# Patient Record
Sex: MALE | Race: WHITE | NOT HISPANIC OR LATINO | Employment: OTHER | URBAN - METROPOLITAN AREA
[De-identification: names, ages, dates, MRNs, and addresses within clinical notes are randomized per-mention and may not be internally consistent; named-entity substitution may affect disease eponyms.]

---

## 2020-10-06 RX ORDER — TAMSULOSIN HYDROCHLORIDE 0.4 MG/1
0.4 CAPSULE ORAL
COMMUNITY
End: 2021-04-14 | Stop reason: SDUPTHER

## 2020-10-06 RX ORDER — MELATONIN
1000 DAILY
COMMUNITY

## 2020-10-06 RX ORDER — ATORVASTATIN CALCIUM 20 MG/1
20 TABLET, FILM COATED ORAL DAILY
COMMUNITY
End: 2021-08-04 | Stop reason: SDUPTHER

## 2020-10-06 RX ORDER — ASPIRIN 81 MG/1
81 TABLET ORAL DAILY
COMMUNITY

## 2020-10-06 RX ORDER — VITAMIN B COMPLEX
TABLET ORAL DAILY
COMMUNITY

## 2020-10-06 RX ORDER — MULTIVITAMIN
1 CAPSULE ORAL DAILY
COMMUNITY

## 2020-10-06 RX ORDER — UBIDECARENONE 75 MG
CAPSULE ORAL DAILY
COMMUNITY

## 2020-10-06 RX ORDER — OMEGA-3 FATTY ACIDS/FISH OIL 300-1000MG
CAPSULE ORAL DAILY
COMMUNITY

## 2020-10-06 RX ORDER — OXYBUTYNIN CHLORIDE 10 MG/1
10 TABLET, EXTENDED RELEASE ORAL
COMMUNITY
End: 2021-04-14

## 2020-10-06 RX ORDER — FINASTERIDE 5 MG/1
5 TABLET, FILM COATED ORAL DAILY
COMMUNITY
End: 2021-04-14 | Stop reason: SDUPTHER

## 2020-10-08 ENCOUNTER — OFFICE VISIT (OUTPATIENT)
Dept: GERIATRICS | Facility: CLINIC | Age: 80
End: 2020-10-08
Payer: MEDICARE

## 2020-10-08 VITALS
TEMPERATURE: 98 F | BODY MASS INDEX: 25.24 KG/M2 | HEART RATE: 48 BPM | WEIGHT: 196.7 LBS | RESPIRATION RATE: 16 BRPM | OXYGEN SATURATION: 98 % | HEIGHT: 74 IN

## 2020-10-08 DIAGNOSIS — I25.10 CORONARY ARTERY DISEASE DUE TO CALCIFIED CORONARY LESION: Primary | ICD-10-CM

## 2020-10-08 DIAGNOSIS — C67.8 MALIGNANT NEOPLASM OF OVERLAPPING SITES OF BLADDER (HCC): ICD-10-CM

## 2020-10-08 DIAGNOSIS — N39.41 URGE INCONTINENCE: ICD-10-CM

## 2020-10-08 DIAGNOSIS — R53.83 OTHER FATIGUE: ICD-10-CM

## 2020-10-08 DIAGNOSIS — N39.43 BENIGN PROSTATIC HYPERPLASIA WITH POST-VOID DRIBBLING: ICD-10-CM

## 2020-10-08 DIAGNOSIS — I25.84 CORONARY ARTERY DISEASE DUE TO CALCIFIED CORONARY LESION: Primary | ICD-10-CM

## 2020-10-08 DIAGNOSIS — E78.00 PURE HYPERCHOLESTEROLEMIA: ICD-10-CM

## 2020-10-08 DIAGNOSIS — N40.1 BENIGN PROSTATIC HYPERPLASIA WITH POST-VOID DRIBBLING: ICD-10-CM

## 2020-10-08 PROBLEM — Z98.890 H/O LAMINECTOMY: Status: ACTIVE | Noted: 2020-10-08

## 2020-10-08 PROCEDURE — 99215 OFFICE O/P EST HI 40 MIN: CPT | Performed by: INTERNAL MEDICINE

## 2020-10-15 ENCOUNTER — OFFICE VISIT (OUTPATIENT)
Dept: GERIATRICS | Facility: CLINIC | Age: 80
End: 2020-10-15
Payer: MEDICARE

## 2020-10-15 VITALS
RESPIRATION RATE: 16 BRPM | SYSTOLIC BLOOD PRESSURE: 116 MMHG | BODY MASS INDEX: 25.37 KG/M2 | DIASTOLIC BLOOD PRESSURE: 62 MMHG | OXYGEN SATURATION: 97 % | WEIGHT: 197.7 LBS | HEIGHT: 74 IN | TEMPERATURE: 97.6 F | HEART RATE: 45 BPM

## 2020-10-15 DIAGNOSIS — N40.1 BENIGN PROSTATIC HYPERPLASIA WITH LOWER URINARY TRACT SYMPTOMS, SYMPTOM DETAILS UNSPECIFIED: ICD-10-CM

## 2020-10-15 DIAGNOSIS — R53.83 OTHER FATIGUE: ICD-10-CM

## 2020-10-15 DIAGNOSIS — I25.10 CORONARY ARTERY DISEASE DUE TO CALCIFIED CORONARY LESION: Primary | ICD-10-CM

## 2020-10-15 DIAGNOSIS — C67.8 MALIGNANT NEOPLASM OF OVERLAPPING SITES OF BLADDER (HCC): ICD-10-CM

## 2020-10-15 DIAGNOSIS — I25.84 CORONARY ARTERY DISEASE DUE TO CALCIFIED CORONARY LESION: Primary | ICD-10-CM

## 2020-10-15 PROBLEM — N18.31 STAGE 3A CHRONIC KIDNEY DISEASE (HCC): Status: ACTIVE | Noted: 2020-10-15

## 2020-10-15 PROCEDURE — 99214 OFFICE O/P EST MOD 30 MIN: CPT | Performed by: INTERNAL MEDICINE

## 2020-11-09 ENCOUNTER — OFFICE VISIT (OUTPATIENT)
Dept: SLEEP CENTER | Facility: CLINIC | Age: 80
End: 2020-11-09
Payer: MEDICARE

## 2020-11-09 VITALS
DIASTOLIC BLOOD PRESSURE: 50 MMHG | TEMPERATURE: 97.2 F | WEIGHT: 203 LBS | BODY MASS INDEX: 26.05 KG/M2 | HEART RATE: 44 BPM | HEIGHT: 74 IN | SYSTOLIC BLOOD PRESSURE: 112 MMHG

## 2020-11-09 DIAGNOSIS — G47.8 NON-RESTORATIVE SLEEP: Primary | ICD-10-CM

## 2020-11-09 DIAGNOSIS — R53.83 OTHER FATIGUE: ICD-10-CM

## 2020-11-09 DIAGNOSIS — R00.1 SINUS BRADYCARDIA: ICD-10-CM

## 2020-11-09 DIAGNOSIS — R35.1 NOCTURIA: ICD-10-CM

## 2020-11-09 DIAGNOSIS — R68.2 DRY MOUTH: ICD-10-CM

## 2020-11-09 PROCEDURE — 99204 OFFICE O/P NEW MOD 45 MIN: CPT | Performed by: PSYCHIATRY & NEUROLOGY

## 2020-11-12 ENCOUNTER — HOSPITAL ENCOUNTER (OUTPATIENT)
Dept: SLEEP CENTER | Facility: CLINIC | Age: 80
Discharge: HOME | End: 2020-11-12
Payer: MEDICARE

## 2020-11-12 DIAGNOSIS — R53.83 OTHER FATIGUE: ICD-10-CM

## 2020-11-12 DIAGNOSIS — R68.2 DRY MOUTH: ICD-10-CM

## 2020-11-12 DIAGNOSIS — R35.1 NOCTURIA: ICD-10-CM

## 2020-11-12 DIAGNOSIS — G47.8 NON-RESTORATIVE SLEEP: ICD-10-CM

## 2020-11-12 PROCEDURE — 95810 POLYSOM 6/> YRS 4/> PARAM: CPT

## 2020-11-12 PROCEDURE — 95810 POLYSOM 6/> YRS 4/> PARAM: CPT | Performed by: PSYCHIATRY & NEUROLOGY

## 2020-11-16 ENCOUNTER — TELEPHONE (OUTPATIENT)
Dept: SLEEP CENTER | Facility: CLINIC | Age: 80
End: 2020-11-16

## 2020-11-16 DIAGNOSIS — G47.33 OSA (OBSTRUCTIVE SLEEP APNEA): Primary | ICD-10-CM

## 2020-11-19 ENCOUNTER — OFFICE VISIT (OUTPATIENT)
Dept: GERIATRICS | Facility: CLINIC | Age: 80
End: 2020-11-19
Payer: MEDICARE

## 2020-11-19 ENCOUNTER — TELEPHONE (OUTPATIENT)
Dept: SLEEP CENTER | Facility: CLINIC | Age: 80
End: 2020-11-19

## 2020-11-19 VITALS
HEART RATE: 50 BPM | SYSTOLIC BLOOD PRESSURE: 116 MMHG | BODY MASS INDEX: 26.04 KG/M2 | WEIGHT: 202.9 LBS | DIASTOLIC BLOOD PRESSURE: 80 MMHG | TEMPERATURE: 96.5 F | HEIGHT: 74 IN | OXYGEN SATURATION: 98 %

## 2020-11-19 DIAGNOSIS — Z01.812 ENCOUNTER FOR PREPROCEDURE SCREENING LABORATORY TESTING FOR COVID-19: Primary | ICD-10-CM

## 2020-11-19 DIAGNOSIS — N40.1 BENIGN PROSTATIC HYPERPLASIA WITH LOWER URINARY TRACT SYMPTOMS, SYMPTOM DETAILS UNSPECIFIED: ICD-10-CM

## 2020-11-19 DIAGNOSIS — L71.9 ROSACEA: ICD-10-CM

## 2020-11-19 DIAGNOSIS — I48.0 PAROXYSMAL ATRIAL FIBRILLATION (HCC): ICD-10-CM

## 2020-11-19 DIAGNOSIS — Z20.822 ENCOUNTER FOR PREPROCEDURE SCREENING LABORATORY TESTING FOR COVID-19: Primary | ICD-10-CM

## 2020-11-19 DIAGNOSIS — H53.9 VISION ABNORMALITIES: ICD-10-CM

## 2020-11-19 DIAGNOSIS — I25.10 CORONARY ARTERY DISEASE DUE TO CALCIFIED CORONARY LESION: ICD-10-CM

## 2020-11-19 DIAGNOSIS — C67.8 MALIGNANT NEOPLASM OF OVERLAPPING SITES OF BLADDER (HCC): ICD-10-CM

## 2020-11-19 DIAGNOSIS — I49.3 PVC (PREMATURE VENTRICULAR CONTRACTION): ICD-10-CM

## 2020-11-19 DIAGNOSIS — G47.33 OSA (OBSTRUCTIVE SLEEP APNEA): Primary | ICD-10-CM

## 2020-11-19 DIAGNOSIS — I25.84 CORONARY ARTERY DISEASE DUE TO CALCIFIED CORONARY LESION: ICD-10-CM

## 2020-11-19 PROCEDURE — 99214 OFFICE O/P EST MOD 30 MIN: CPT | Performed by: INTERNAL MEDICINE

## 2020-12-03 ENCOUNTER — OFFICE VISIT (OUTPATIENT)
Dept: SLEEP CENTER | Facility: CLINIC | Age: 80
End: 2020-12-03
Payer: MEDICARE

## 2020-12-03 VITALS
WEIGHT: 205.4 LBS | HEIGHT: 74 IN | DIASTOLIC BLOOD PRESSURE: 50 MMHG | BODY MASS INDEX: 26.36 KG/M2 | OXYGEN SATURATION: 97 % | SYSTOLIC BLOOD PRESSURE: 118 MMHG | HEART RATE: 46 BPM

## 2020-12-03 DIAGNOSIS — G47.33 OSA (OBSTRUCTIVE SLEEP APNEA): Primary | ICD-10-CM

## 2020-12-03 DIAGNOSIS — G47.61 PLMD (PERIODIC LIMB MOVEMENT DISORDER): ICD-10-CM

## 2020-12-03 PROCEDURE — 99214 OFFICE O/P EST MOD 30 MIN: CPT | Performed by: PSYCHIATRY & NEUROLOGY

## 2020-12-03 RX ORDER — GABAPENTIN 100 MG/1
100 CAPSULE ORAL
Qty: 30 CAPSULE | Refills: 1 | Status: SHIPPED | OUTPATIENT
Start: 2020-12-03 | End: 2021-04-01 | Stop reason: ALTCHOICE

## 2021-01-07 ENCOUNTER — CONSULT (OUTPATIENT)
Dept: CARDIOLOGY CLINIC | Facility: CLINIC | Age: 81
End: 2021-01-07
Payer: MEDICARE

## 2021-01-07 VITALS
HEIGHT: 74 IN | SYSTOLIC BLOOD PRESSURE: 138 MMHG | DIASTOLIC BLOOD PRESSURE: 62 MMHG | WEIGHT: 203.9 LBS | HEART RATE: 43 BPM | BODY MASS INDEX: 26.17 KG/M2

## 2021-01-07 DIAGNOSIS — I25.10 CORONARY ARTERY DISEASE DUE TO CALCIFIED CORONARY LESION: Primary | ICD-10-CM

## 2021-01-07 DIAGNOSIS — I48.0 PAROXYSMAL ATRIAL FIBRILLATION (HCC): ICD-10-CM

## 2021-01-07 DIAGNOSIS — I49.3 PVC (PREMATURE VENTRICULAR CONTRACTION): ICD-10-CM

## 2021-01-07 DIAGNOSIS — Z98.890 STATUS POST RADIOFREQUENCY ABLATION (RFA) OPERATION FOR ARRHYTHMIA: ICD-10-CM

## 2021-01-07 DIAGNOSIS — I25.84 CORONARY ARTERY DISEASE DUE TO CALCIFIED CORONARY LESION: Primary | ICD-10-CM

## 2021-01-07 DIAGNOSIS — I49.8 BIGEMINY: ICD-10-CM

## 2021-01-07 DIAGNOSIS — Z86.79 STATUS POST RADIOFREQUENCY ABLATION (RFA) OPERATION FOR ARRHYTHMIA: ICD-10-CM

## 2021-01-07 DIAGNOSIS — E78.00 PURE HYPERCHOLESTEROLEMIA: ICD-10-CM

## 2021-01-07 DIAGNOSIS — R00.1 SINUS BRADYCARDIA: ICD-10-CM

## 2021-01-07 PROCEDURE — 99204 OFFICE O/P NEW MOD 45 MIN: CPT | Performed by: INTERNAL MEDICINE

## 2021-01-07 PROCEDURE — 93000 ELECTROCARDIOGRAM COMPLETE: CPT | Performed by: INTERNAL MEDICINE

## 2021-01-07 NOTE — PROGRESS NOTES
Cardiology Follow Up    Gary Trejo [facility]  1940  [address] [facility]  [facility]  [address]  [phone]    1  Coronary artery disease due to calcified coronary lesion  Ambulatory referral to Cardiology   2  Sinus bradycardia     3  PVC (premature ventricular contraction)  Ambulatory referral to Cardiology   4  Bigeminy     5  Paroxysmal atrial fibrillation [facility])  Ambulatory referral to Cardiology   6  Pure hypercholesterolemia     7  Status post radiofrequency ablation (RFA) operation for arrhythmia         Interval History:  Cardiology consultation  55-year-old male who has no coronary artery disease  Multiple records reviewed, reports available, imaging are not  Patient recently moved to the area  He has history of coronary disease PTCA/drug stent of the RCA 3 stents followed by stage PTCA drug stent of the proximal LAD in 2013  He does have mild sick sinus syndrome resting sinus bradycardia not on beta-blocker because of that  No recent lipid profile, total cholesterol 2 years ago 126 with an HDL 44 and LDL 72  He is on medium intensity statin therapy  History paroxysmal atrial fibrillation, status post radiofrequency ablation/cryoablation in 2014  He has remained clinically electrographically in sinus rhythm/sinus bradycardia  Patient is on aspirin therapy  Left ventricular systolic function normal on echocardiogram 6 years ago  The patient currently exhibits no complaints, specifically denies any chest pain or dyspnea, no syncope or presyncope  No palpitations, denies any focal neurological deficits denies any amaurosis fugax     Patient is very active for age, he walks about [phone] steps a day  Again he has had no symptoms  He was diagnosed with mild obstructive sleep apnea and is scheduled for a titration study        Patient Active Problem List   Diagnosis    Coronary artery disease due to calcified coronary lesion    Malignant neoplasm of overlapping sites of bladder ([facility] [address] )    Pure hypercholesterolemia    Urge incontinence    Benign prostatic hyperplasia with lower urinary tract symptoms    H/O laminectomy    Other fatigue    Stage 3a chronic kidney disease    LIOR (obstructive sleep apnea)    PLMD (periodic limb movement disorder)    Sinus bradycardia    PVC (premature ventricular contraction)    Bigeminy    Rosacea    Paroxysmal atrial fibrillation (HCC)    Status post radiofrequency ablation (RFA) operation for arrhythmia     No past medical history on file  Social History     Socioeconomic History    Marital status:       Spouse name: Not on file    Number of children: Not on file    Years of education: Not on file    Highest education level: Not on file   Occupational History    Not on file   Social Needs    Financial resource strain: Not on file    Food insecurity     Worry: Not on file     Inability: Not on file    Transportation needs     Medical: Not on file     Non-medical: Not on file   Tobacco Use    Smoking status: Former Smoker     Types: Cigarettes     Start date: 10/8/1961     Quit date: 10/8/1977     Years since quittin 2    Smokeless tobacco: Never Used   Substance and Sexual Activity    Alcohol use: Not Currently     Frequency: Never     Binge frequency: Never    Drug use: Never    Sexual activity: Not Currently   Lifestyle    Physical activity     Days per week: 7 days     Minutes per session: 60 min    Stress: Not on file   Relationships    Social connections     Talks on phone: Not on file     Gets together: Not on file     Attends Orthodoxy service: Not on file     Active member of club or organization: Not on file     Attends meetings of clubs or organizations: Not on file     Relationship status: Not on file    Intimate partner violence     Fear of current or ex partner: Not on file     Emotionally abused: Not on file     Physically abused: Not on file     Forced sexual activity: Not on file   Other Topics Concern    Not on file   Social History Narrative    Not on file      No family history on file  Past Surgical History:   Procedure Laterality Date    CATARACT EXTRACTION, BILATERAL Bilateral [id number]       Current Outpatient Medications:     aspirin (ECOTRIN LOW STRENGTH) 81 mg EC tablet, Take 81 mg by mouth daily, Disp: , Rfl:     atorvastatin (LIPITOR) 20 mg tablet, Take 20 mg by mouth daily, Disp: , Rfl:     cholecalciferol (VITAMIN D3) 1,000 units tablet, Take 1,000 Units by mouth daily, Disp: , Rfl:     Coenzyme Q10 (CoQ10) 100 MG CAPS, Take by mouth daily , Disp: , Rfl:     cyanocobalamin (VITAMIN B-12) 100 mcg tablet, Take by mouth daily, Disp: , Rfl:     finasteride (PROSCAR) 5 mg tablet, Take 5 mg by mouth daily, Disp: , Rfl:     gabapentin (NEURONTIN) 100 mg capsule, Take 1 capsule (100 mg total) by mouth daily at bedtime, Disp: 30 capsule, Rfl: 1    Glucosamine-Chondroit-Vit C-Mn (GLUCOSAMINE CHONDR 1500 COMPLX PO), Take by mouth daily , Disp: , Rfl:     metroNIDAZOLE (METROCREAM) 0 75 % cream, Apply topically 2 (two) times a day, Disp: 45 g, Rfl: 3    Multiple Vitamin (multivitamin) capsule, Take 1 capsule by mouth daily, Disp: , Rfl:     Omega 3 1000 MG CAPS, Take by mouth daily , Disp: , Rfl:     oxybutynin (DITROPAN-XL) 10 MG 24 hr tablet, Take 10 mg by mouth daily at bedtime, Disp: , Rfl:     tamsulosin (FLOMAX) 0 4 mg, Take 0 4 mg by mouth daily with dinner, Disp: , Rfl:   Allergies   Allergen Reactions    Sulfa Antibiotics Hives       Labs:  No results found for any previous visit  Imaging: No results found  Review of Systems:  Review of Systems   Constitutional: Negative for activity change, fatigue and unexpected weight change  HENT: Negative for hearing loss, nosebleeds, sore throat and trouble swallowing  Eyes: Negative for visual disturbance     Respiratory: Negative for apnea, cough, choking, chest tightness, shortness of breath, wheezing and stridor  Cardiovascular: Negative for chest pain, palpitations and leg swelling  Gastrointestinal: Negative for abdominal pain, anal bleeding, blood in stool, constipation, diarrhea, nausea and rectal pain  Endocrine: Negative for cold intolerance and heat intolerance  Genitourinary: Positive for frequency  Negative for difficulty urinating, dysuria, flank pain, hematuria and urgency  Musculoskeletal: Negative for arthralgias, gait problem and myalgias  Skin: Negative for color change, pallor, rash and wound  Allergic/Immunologic: Negative for immunocompromised state  Neurological: Negative for dizziness, tremors, syncope, facial asymmetry, speech difficulty, weakness, light-headedness and numbness  Hematological: Does not bruise/bleed easily  Psychiatric/Behavioral: Negative for decreased concentration and sleep disturbance  The patient is not nervous/anxious  Physical Exam:  Physical Exam  Vitals signs reviewed  Nursing note reviewed: Appears younger than his stated age  Constitutional:       General: He is not in acute distress  Appearance: Normal appearance  He is normal weight  He is not ill-appearing, toxic-appearing or diaphoretic  HENT:      Head: Normocephalic  Eyes:      General: No scleral icterus  Conjunctiva/sclera: Conjunctivae normal    Neck:      Vascular: No carotid bruit  Cardiovascular:      Rate and Rhythm: Regular rhythm  Bradycardia present  Pulses: Normal pulses  Heart sounds: Normal heart sounds  No murmur  No friction rub  No gallop  Pulmonary:      Effort: Pulmonary effort is normal  No respiratory distress  Breath sounds: Normal breath sounds  No stridor  No wheezing, rhonchi or rales  Abdominal:      General: Abdomen is flat  Bowel sounds are normal       Palpations: Abdomen is soft  Tenderness: There is no abdominal tenderness  Musculoskeletal:      Right lower leg: No edema  Left lower leg: No edema  Skin:     General: Skin is warm and dry  Capillary Refill: Capillary refill takes less than 2 seconds  Coloration: Skin is not jaundiced or pale  Findings: No bruising, erythema or rash  Neurological:      General: No focal deficit present  Mental Status: He is alert and oriented to person, place, and time  Psychiatric:         Mood and Affect: Mood normal          Behavior: Behavior normal          Thought Content: Thought content normal          Judgment: Judgment normal          Discussion/Summary:  Coronary artery disease, no recent testing  Favor 24 hour Holter problems exercise stress to assess chronotropic competence, and echocardiogram   Check basic blood work , including lipid profile and electrolytes     No change in medications  He is to notify me he has any symptoms including chest pain dyspnea syncope or presyncope decreased exercise tolerance of fatigability    This note was completed in part utilizing [employer] direct voice recognition software  Grammatical errors, random word insertion, spelling mistakes, and incomplete sentences may be an occasional consequence of the system secondary to software limitations, ambient noise and hardware issues  At the time of dictation, efforts were made to edit, clarify and /or correct errors  Please read the chart carefully and recognize, using context, where substitutions have occurred  If you have any questions or concerns about the context, text or information contained within the body of this dictation, please contact myself, the provider, for further clarification

## 2021-01-08 ENCOUNTER — LAB (OUTPATIENT)
Dept: LAB | Facility: CLINIC | Age: 81
End: 2021-01-08
Payer: MEDICARE

## 2021-01-08 DIAGNOSIS — I25.10 CORONARY ARTERY DISEASE DUE TO CALCIFIED CORONARY LESION: ICD-10-CM

## 2021-01-08 DIAGNOSIS — I25.84 CORONARY ARTERY DISEASE DUE TO CALCIFIED CORONARY LESION: ICD-10-CM

## 2021-01-08 DIAGNOSIS — R53.83 OTHER FATIGUE: ICD-10-CM

## 2021-01-08 DIAGNOSIS — E78.00 PURE HYPERCHOLESTEROLEMIA: ICD-10-CM

## 2021-01-08 LAB
ALBUMIN SERPL BCP-MCNC: 3.4 G/DL (ref 3.5–5)
ALP SERPL-CCNC: 83 U/L (ref 46–116)
ALT SERPL W P-5'-P-CCNC: 26 U/L (ref 12–78)
ANION GAP SERPL CALCULATED.3IONS-SCNC: 0 MMOL/L (ref 4–13)
AST SERPL W P-5'-P-CCNC: 18 U/L (ref 5–45)
BASOPHILS # BLD AUTO: 0.03 THOUSANDS/ΜL (ref 0–0.1)
BASOPHILS NFR BLD AUTO: 1 % (ref 0–1)
BILIRUB SERPL-MCNC: 0.55 MG/DL (ref 0.2–1)
BUN SERPL-MCNC: 31 MG/DL (ref 5–25)
CALCIUM ALBUM COR SERPL-MCNC: 10.1 MG/DL (ref 8.3–10.1)
CALCIUM SERPL-MCNC: 9.6 MG/DL (ref 8.3–10.1)
CHLORIDE SERPL-SCNC: 112 MMOL/L (ref 100–108)
CHOLEST SERPL-MCNC: 120 MG/DL (ref 50–200)
CO2 SERPL-SCNC: 30 MMOL/L (ref 21–32)
CREAT SERPL-MCNC: 1.24 MG/DL (ref 0.6–1.3)
EOSINOPHIL # BLD AUTO: 0.2 THOUSAND/ΜL (ref 0–0.61)
EOSINOPHIL NFR BLD AUTO: 3 % (ref 0–6)
ERYTHROCYTE [DISTWIDTH] IN BLOOD BY AUTOMATED COUNT: 13.2 % (ref 11.6–15.1)
GFR SERPL CREATININE-BSD FRML MDRD: 54 ML/MIN/1.73SQ M
GLUCOSE P FAST SERPL-MCNC: 77 MG/DL (ref 65–99)
HCT VFR BLD AUTO: 46.6 % (ref 36.5–49.3)
HDLC SERPL-MCNC: 43 MG/DL
HGB BLD-MCNC: 14.9 G/DL (ref 12–17)
IMM GRANULOCYTES # BLD AUTO: 0.03 THOUSAND/UL (ref 0–0.2)
IMM GRANULOCYTES NFR BLD AUTO: 1 % (ref 0–2)
LDLC SERPL CALC-MCNC: 67 MG/DL (ref 0–100)
LYMPHOCYTES # BLD AUTO: 1.71 THOUSANDS/ΜL (ref 0.6–4.47)
LYMPHOCYTES NFR BLD AUTO: 29 % (ref 14–44)
MCH RBC QN AUTO: 30.7 PG (ref 26.8–34.3)
MCHC RBC AUTO-ENTMCNC: 32 G/DL (ref 31.4–37.4)
MCV RBC AUTO: 96 FL (ref 82–98)
MONOCYTES # BLD AUTO: 0.6 THOUSAND/ΜL (ref 0.17–1.22)
MONOCYTES NFR BLD AUTO: 10 % (ref 4–12)
NEUTROPHILS # BLD AUTO: 3.41 THOUSANDS/ΜL (ref 1.85–7.62)
NEUTS SEG NFR BLD AUTO: 56 % (ref 43–75)
NRBC BLD AUTO-RTO: 0 /100 WBCS
PLATELET # BLD AUTO: 206 THOUSANDS/UL (ref 149–390)
PMV BLD AUTO: 10.4 FL (ref 8.9–12.7)
POTASSIUM SERPL-SCNC: 4.3 MMOL/L (ref 3.5–5.3)
PROT SERPL-MCNC: 6.3 G/DL (ref 6.4–8.2)
RBC # BLD AUTO: 4.85 MILLION/UL (ref 3.88–5.62)
SODIUM SERPL-SCNC: 142 MMOL/L (ref 136–145)
TRIGL SERPL-MCNC: 52 MG/DL
TSH SERPL DL<=0.05 MIU/L-ACNC: 2.67 UIU/ML (ref 0.36–3.74)
WBC # BLD AUTO: 5.98 THOUSAND/UL (ref 4.31–10.16)

## 2021-01-08 PROCEDURE — 84443 ASSAY THYROID STIM HORMONE: CPT

## 2021-01-08 PROCEDURE — 80061 LIPID PANEL: CPT

## 2021-01-08 PROCEDURE — 80053 COMPREHEN METABOLIC PANEL: CPT

## 2021-01-08 PROCEDURE — 85025 COMPLETE CBC W/AUTO DIFF WBC: CPT

## 2021-01-08 PROCEDURE — 36415 COLL VENOUS BLD VENIPUNCTURE: CPT

## 2021-01-18 ENCOUNTER — HOSPITAL ENCOUNTER (OUTPATIENT)
Dept: NON INVASIVE DIAGNOSTICS | Facility: CLINIC | Age: 81
Discharge: HOME | End: 2021-01-18
Payer: MEDICARE

## 2021-01-18 DIAGNOSIS — Z86.79 STATUS POST RADIOFREQUENCY ABLATION (RFA) OPERATION FOR ARRHYTHMIA: ICD-10-CM

## 2021-01-18 DIAGNOSIS — R00.1 SINUS BRADYCARDIA: ICD-10-CM

## 2021-01-18 DIAGNOSIS — Z98.890 STATUS POST RADIOFREQUENCY ABLATION (RFA) OPERATION FOR ARRHYTHMIA: ICD-10-CM

## 2021-01-18 DIAGNOSIS — I48.0 PAROXYSMAL ATRIAL FIBRILLATION (HCC): ICD-10-CM

## 2021-01-18 DIAGNOSIS — I25.10 CORONARY ARTERY DISEASE DUE TO CALCIFIED CORONARY LESION: ICD-10-CM

## 2021-01-18 DIAGNOSIS — I49.8 BIGEMINY: ICD-10-CM

## 2021-01-18 DIAGNOSIS — E78.00 PURE HYPERCHOLESTEROLEMIA: ICD-10-CM

## 2021-01-18 DIAGNOSIS — I25.84 CORONARY ARTERY DISEASE DUE TO CALCIFIED CORONARY LESION: ICD-10-CM

## 2021-01-18 LAB
CHEST PAIN STATEMENT: NORMAL
MAX DIASTOLIC BP: 70 MMHG
MAX HEART RATE: 131 BPM
MAX PREDICTED HEART RATE: 138 BPM
MAX. SYSTOLIC BP: 160 MMHG
PROTOCOL NAME: NORMAL
REASON FOR TERMINATION: NORMAL
TARGET HR FORMULA: NORMAL
TEST INDICATION: NORMAL
TIME IN EXERCISE PHASE: NORMAL

## 2021-01-18 PROCEDURE — 93226 XTRNL ECG REC<48 HR SCAN A/R: CPT

## 2021-01-18 PROCEDURE — 93225 XTRNL ECG REC<48 HRS REC: CPT

## 2021-01-18 PROCEDURE — 93016 CV STRESS TEST SUPVJ ONLY: CPT | Performed by: INTERNAL MEDICINE

## 2021-01-18 PROCEDURE — 93018 CV STRESS TEST I&R ONLY: CPT | Performed by: INTERNAL MEDICINE

## 2021-01-18 PROCEDURE — 93017 CV STRESS TEST TRACING ONLY: CPT

## 2021-01-22 PROCEDURE — 93227 XTRNL ECG REC<48 HR R&I: CPT | Performed by: INTERNAL MEDICINE

## 2021-02-10 ENCOUNTER — HOSPITAL ENCOUNTER (OUTPATIENT)
Dept: NON INVASIVE DIAGNOSTICS | Facility: CLINIC | Age: 81
Discharge: HOME | End: 2021-02-10
Payer: MEDICARE

## 2021-02-10 DIAGNOSIS — R00.1 SINUS BRADYCARDIA: ICD-10-CM

## 2021-02-10 DIAGNOSIS — I25.10 CORONARY ARTERY DISEASE DUE TO CALCIFIED CORONARY LESION: ICD-10-CM

## 2021-02-10 DIAGNOSIS — I48.0 PAROXYSMAL ATRIAL FIBRILLATION (HCC): ICD-10-CM

## 2021-02-10 DIAGNOSIS — I25.84 CORONARY ARTERY DISEASE DUE TO CALCIFIED CORONARY LESION: ICD-10-CM

## 2021-02-10 DIAGNOSIS — E78.00 PURE HYPERCHOLESTEROLEMIA: ICD-10-CM

## 2021-02-10 PROCEDURE — 93306 TTE W/DOPPLER COMPLETE: CPT

## 2021-02-10 PROCEDURE — 93306 TTE W/DOPPLER COMPLETE: CPT | Performed by: INTERNAL MEDICINE

## 2021-02-11 ENCOUNTER — HOSPITAL ENCOUNTER (OUTPATIENT)
Dept: SLEEP CENTER | Facility: CLINIC | Age: 81
Discharge: HOME | End: 2021-02-11
Payer: MEDICARE

## 2021-02-11 DIAGNOSIS — G47.33 OSA (OBSTRUCTIVE SLEEP APNEA): ICD-10-CM

## 2021-02-11 PROCEDURE — 95811 POLYSOM 6/>YRS CPAP 4/> PARM: CPT | Performed by: PSYCHIATRY & NEUROLOGY

## 2021-02-11 PROCEDURE — 95811 POLYSOM 6/>YRS CPAP 4/> PARM: CPT

## 2021-02-12 NOTE — PROGRESS NOTES
Sleep Study Documentation    Pre-Sleep Study       Sleep testing procedure explained to patient:YES    Patient napped prior to study:NO    Caffeine:Dayshift worker after 12PM   Caffeine use:NO    Alcohol:Dayshift workers after 5PM: Alcohol use:NO    Typical day for patient:YES       Study Documentation    Sleep Study Indications: coronary artery disease, fatigue, stage 3 chronic kidney disease, LIOR, PLMD, sinus bradycardia, PVC'S, bigeminy, A-FIB,     Sleep Study: Treatment   Optimal PAP pressure: 7cm  Leak:Small  Snore:Eliminated  REM Obtained:yes  Supplemental O2: no    Minimum SaO2 94%  Baseline SaO2 97%  PAP mask tried (list all) medium resmed airtouch F20  PAP mask choice (final) medium resmed airtouch F20  PAP mask type:full face  PAP pressure at which snoring was eliminated 6cm  Minimum SaO2 at final PAP pressure 95%  Mode of Therapy:CPAP  ETCO2:No  CPAP changed to BiPAP:No    Mode of Therapy:CPAP    EKG abnormalities: yes:  EPOCH example and comments: sinus bradycardia and PVC'S noted throughout the test     EEG abnormalities: no    Sleep Study Recorded < 2 hours: N/A    Sleep Study Recorded > 2 hours but incomplete study: N/A    Sleep Study Recorded 6 hours but no sleep obtained: NO    Patient classification: retired       Post-Sleep Study    Medication used at bedtime or during sleep study:NO    Patient reports time it took to fall asleep:less than 20 minutes    Patient reports waking up during study:1 to 2 times  Patient reports returning to sleep in 10 to 30 minutes  Patient reports sleeping 6 to 8 hours with dreaming  Patient reports sleep during study:typical    Patient rated sleepiness: Not sleepy or tired    PAP treatment:yes: Post PAP treatment patient reports feeling better and  would wear PAP mask at home

## 2021-02-15 DIAGNOSIS — G47.33 OSA (OBSTRUCTIVE SLEEP APNEA): Primary | ICD-10-CM

## 2021-02-15 DIAGNOSIS — G47.61 PLMD (PERIODIC LIMB MOVEMENT DISORDER): ICD-10-CM

## 2021-02-16 ENCOUNTER — TELEPHONE (OUTPATIENT)
Dept: SLEEP CENTER | Facility: CLINIC | Age: 81
End: 2021-02-16

## 2021-02-16 NOTE — TELEPHONE ENCOUNTER
Spoke with the patient discussed CPAP study and CPAP order  Patient has multiple questions concerning CPAP vs BIPAP VS mandibular advancement device  He has follow up appointment scheduled for 3/22/2021   He reports he wants to think about it and also discuss options with his dentist

## 2021-02-16 NOTE — TELEPHONE ENCOUNTER
Left message for the patient to call back for sleep study results  CPAP ordered  Patient needs to schedule DME set up   May need to reschedule compliance appointment

## 2021-02-16 NOTE — TELEPHONE ENCOUNTER
----- Message from Yuko Chen MD sent at 2/15/2021  5:10 PM EST -----  CPAP study read  CPAP ordered

## 2021-03-22 ENCOUNTER — OFFICE VISIT (OUTPATIENT)
Dept: SLEEP CENTER | Facility: CLINIC | Age: 81
End: 2021-03-22
Payer: MEDICARE

## 2021-03-22 VITALS
HEART RATE: 48 BPM | HEIGHT: 74 IN | WEIGHT: 206 LBS | DIASTOLIC BLOOD PRESSURE: 60 MMHG | SYSTOLIC BLOOD PRESSURE: 122 MMHG | BODY MASS INDEX: 26.44 KG/M2

## 2021-03-22 DIAGNOSIS — G47.61 PLMD (PERIODIC LIMB MOVEMENT DISORDER): ICD-10-CM

## 2021-03-22 DIAGNOSIS — G47.33 OSA (OBSTRUCTIVE SLEEP APNEA): Primary | ICD-10-CM

## 2021-03-22 PROCEDURE — 99214 OFFICE O/P EST MOD 30 MIN: CPT | Performed by: PSYCHIATRY & NEUROLOGY

## 2021-03-22 NOTE — PROGRESS NOTES
Sleep Medicine Follow-Up Note    HPI: 77yo M with LIOR being seen for a follow up  Treatment Summary: 11/2020 PSG: apnea/hypopnea index (AHI) of 6 4 events per hour of sleep   The AHI in the supine position was 17 6   The AHI during REM sleep was 0 0  PLM index of 106  6  CPAP titration and treatment for PLMD recommended  CPAP study 2/2021: CPAP 7cm  HPI:   Today, patient presents unaccompanied to get his sleep study results  He took the gabapentin once for the PMLD, but felt drugged the next day and stopped taking it  He doesn't know that his legs are moving at night  He also is looking into getting an oral appliance  He continues to not feel refreshed when he wakes up and is tired daily  ROS:   Genitourinary none   Cardiology none   Gastrointestinal none   Neurology none   Constitutional none   Integumentary none   Psychiatry none   Musculoskeletal none   Pulmonary none   ENT none   Endocrine none   Hematological none         Sleep Pattern:  -Bedtime: 11pm  -Lights out: same time  -Environmental: No lights/TV  -Latency: 10-15 mins  -Awakenings: 2  -Wake time: 730am  -Rise time: same time  -Days off: same  -Patient's estimate of total sleep time: 7-8h    Daytime Symptoms:  -Upon Awakening: tired and unreshed  -Daytime fatigue/sleepiness: denied  -Naps: denied  -Involuntary Dozing: in the evening while reading  -Cognitive Symptoms: some short term memory issue that he referred to as normal for age  -Driving: Difficulty with sleepiness and driving:  Sleepy when an hour long trip  He rolled the windows down  -- Close calls related to sleepiness: no   -- Accidents related to sleepiness: no    Substance Use:  -Caffeine: no  -Alcohol: 3 times a week  -THC: denied    --> Denies any significant medical changes since last visit     --> Supplemental Oxygen Use: denies    Questionnaire:  Sitting and reading: Slight chance of dozing  Watching TV: Would never doze  Sitting, inactive in a public place (e g  a theatre or a meeting): Would never doze  As a passenger in a car for an hour without a break: Would never doze  Lying down to rest in the afternoon when circumstances permit: Would never doze  Sitting and talking to someone: Would never doze  Sitting quietly after a lunch without alcohol: Would never doze  In a car, while stopped for a few minutes in traffic: Would never doze  Total score: 1      PE:  /60   Pulse (!) 48   Ht 6' 1 5" (1 867 m)   Wt 93 4 kg (206 lb)   BMI 26 81 kg/m²     General:  In NAD  Pul: Respirations: unlaboured  MS: No atrophy  Neuro: No resting tremor  Gait normal turning & station; unremarkable overall  Psych: Socially appropriate  Pleasant  No overt dysphoria  Assessment: The patient continues to be symptomatic  He is unsure if he wants to start with CPAP or OAT and will call after he makes the decision  Recommendations:    1) CPAP vs OAT, he will let us know  2) Safe driving reviewed  3) Follow-up 6-8 weeks after initiating treatment with CPAP or 4 months if he get an OAT  4) Call with any questions or concerns  All questions answered for the patient, who indicated understanding and agreed with the plan       Yuko Chen MD  Psychiatry/ Sleep medicine

## 2021-03-22 NOTE — PATIENT INSTRUCTIONS
Recommendations:    1) CPAP vs OAT, he will let us know  2) Safe driving reviewed  3) Follow-up 6-8 weeks after initiating treatment with CPAP or 4 months if he get an OAT  4) Call with any questions or concerns

## 2021-03-23 ENCOUNTER — TELEPHONE (OUTPATIENT)
Dept: SLEEP CENTER | Facility: CLINIC | Age: 81
End: 2021-03-23

## 2021-04-01 ENCOUNTER — OFFICE VISIT (OUTPATIENT)
Dept: GERIATRICS | Age: 81
End: 2021-04-01
Payer: MEDICARE

## 2021-04-01 VITALS
HEIGHT: 73 IN | OXYGEN SATURATION: 98 % | SYSTOLIC BLOOD PRESSURE: 144 MMHG | RESPIRATION RATE: 16 BRPM | TEMPERATURE: 97 F | HEART RATE: 50 BPM | WEIGHT: 203.8 LBS | DIASTOLIC BLOOD PRESSURE: 66 MMHG | BODY MASS INDEX: 27.01 KG/M2

## 2021-04-01 DIAGNOSIS — I48.0 PAROXYSMAL ATRIAL FIBRILLATION (HCC): Primary | ICD-10-CM

## 2021-04-01 DIAGNOSIS — I25.84 CORONARY ARTERY DISEASE DUE TO CALCIFIED CORONARY LESION: ICD-10-CM

## 2021-04-01 DIAGNOSIS — I49.3 PVC (PREMATURE VENTRICULAR CONTRACTION): ICD-10-CM

## 2021-04-01 DIAGNOSIS — N40.1 BENIGN PROSTATIC HYPERPLASIA WITH LOWER URINARY TRACT SYMPTOMS, SYMPTOM DETAILS UNSPECIFIED: ICD-10-CM

## 2021-04-01 DIAGNOSIS — H91.93 DECREASED HEARING OF BOTH EARS: ICD-10-CM

## 2021-04-01 DIAGNOSIS — C67.8 MALIGNANT NEOPLASM OF OVERLAPPING SITES OF BLADDER (HCC): ICD-10-CM

## 2021-04-01 DIAGNOSIS — I25.10 CORONARY ARTERY DISEASE DUE TO CALCIFIED CORONARY LESION: ICD-10-CM

## 2021-04-01 DIAGNOSIS — L71.9 ROSACEA: ICD-10-CM

## 2021-04-01 DIAGNOSIS — G47.33 OSA (OBSTRUCTIVE SLEEP APNEA): ICD-10-CM

## 2021-04-01 DIAGNOSIS — N18.31 STAGE 3A CHRONIC KIDNEY DISEASE (HCC): ICD-10-CM

## 2021-04-01 DIAGNOSIS — E78.00 PURE HYPERCHOLESTEROLEMIA: ICD-10-CM

## 2021-04-01 PROCEDURE — 99215 OFFICE O/P EST HI 40 MIN: CPT | Performed by: STUDENT IN AN ORGANIZED HEALTH CARE EDUCATION/TRAINING PROGRAM

## 2021-04-01 RX ORDER — METRONIDAZOLE 7.5 MG/G
GEL TOPICAL
COMMUNITY
Start: 2021-03-23 | End: 2022-07-06 | Stop reason: SDUPTHER

## 2021-04-01 NOTE — ASSESSMENT & PLAN NOTE
Lab Results   Component Value Date    EGFR 54 01/08/2021    CREATININE 1 24 01/08/2021     Stable, Cr & GFR around his baseline  Avoid nephrotoxic agents

## 2021-04-01 NOTE — PROGRESS NOTES
Assessment/Plan:    LIOR (obstructive sleep apnea)  -pt was diagnosed with mild to mod LIOR - 1st sleep study in 11/20, 2nd in 02/21, recommended to use CPAP pressure 7  -however, pt wants to try oral appliance first  -seen by dentist, already set up appts for fitting etc   -If fail, will consider CPAP    Coronary artery disease due to calcified coronary lesion  -s/p 4 stents, asymptomatic  -recently seen by cardiologist, normal stress test    Sinus bradycardia  -stable, asymptomatic  -Recent holter in 1/21 showed PACs, PVCs jenny ventricular bigeminy  -next cardio follow up in a year  PVC (premature ventricular contraction)  -asymptomatic, not burdensome  -follows with cardio    Paroxysmal atrial fibrillation (HCC)  -asymptomatic, on aspirin  -h/o ablation in 2014, well controlled since then and not on [facility]  -follows with cardio  -will need LIOR control    Rosacea  -present for 20years  -well control with metrogel  -dermato referral for further Mx and annual skin check    Malignant neoplasm of overlapping sites of bladder ([address] )  -well controlled  -in the process of setting appt with uro in the area      Benign prostatic hyperplasia with lower urinary tract symptoms  -on oxybutynin, experience dry mouth, dry eyes, constipation which are manageable   -pt is considering urolift, follows uro    Stage 3a chronic kidney disease  Lab Results   Component Value Date    EGFR 54 01/08/2021    CREATININE 1 24 01/08/2021     Stable, Cr & GFR around his baseline  Avoid nephrotoxic agents         Problem List Items Addressed This Visit        Respiratory    LIOR (obstructive sleep apnea) - Primary     -pt was diagnosed with mild to mod LIOR - 1st sleep study in 11/20, 2nd in 02/21, recommended to use CPAP pressure 7  -however, pt wants to try oral appliance first  -seen by dentist, already set up appts for fitting etc   -If fail, will consider CPAP            Cardiovascular and Mediastinum    Coronary artery disease due to calcified coronary lesion     -s/p 4 stents, asymptomatic  -recently seen by cardiologist, normal stress test         PVC (premature ventricular contraction)     -asymptomatic, not burdensome  -follows with cardio         Paroxysmal atrial fibrillation ([facility] )     -asymptomatic, on aspirin  -h/o ablation in 2014, well controlled since then and not on [facility]  -follows with cardio  -will need LIOR control            Musculoskeletal and Integument    Rosacea     -present for 20years  -well control with metrogel  -dermato referral for further Mx and annual skin check         Relevant Medications    metroNIDAZOLE (METROGEL) 0 75 % gel    Other Relevant Orders    Ambulatory referral to Dermatology       Genitourinary    Malignant neoplasm of overlapping sites of bladder ([facility] )     -well controlled  -in the process of setting appt with uro in the area  Benign prostatic hyperplasia with lower urinary tract symptoms     -on oxybutynin, experience dry mouth, dry eyes, constipation which are manageable   -pt is considering urolift, follows uro         Stage 3a chronic kidney disease     Lab Results   Component Value Date    EGFR 54 01/08/2021    CREATININE 1 24 01/08/2021     Stable, Cr & GFR around his baseline  Avoid nephrotoxic agents            Other    Decreased hearing of both ears    Relevant Orders    Ambulatory referral to Audiology            Subjective:      Patient ID: Ashley Ramos II is a 78 y o  male to establish care in the office  Pt moved to [facility] 6 months ago after his wife passed away  He is currently living in [facility] independent living  Social support is his son family  Pt is independent with daily activities, finance, driving etc  Pt said he is forgetful about minor things but not bothersome  No issue with fire or water hazards  He was diagnosed with mild-mod LIOR with sleep study, eligible for CPAP   He would like to try oral appliance first, and seen by dentist  He has urinary problem for which he takes oxybutynin which gives side effects of dry eyes, dry mouth, and constipation   He had bladder CA which has been well controlled  He has had regular uro follow up, every year  He stopped smoking in 1970s  Completed age appropriate vaccinations except tetanus which we recommend him to get at the local pharmacy  He has rosacea for 20 years, well controlled with metrogel  Will refer to dermato for further Mx and annual skin check  Recently seen by cardio with normal Echo, stress test  Holter shows PACs and PVCs which are asymptomatic  Pt said his hearing is not optimal so will refer to audiologist  He had cataract surgery before and has regular ophthalmology follow up  HPI    The following portions of the patient's history were reviewed and updated as appropriate: allergies, current medications, past family history, past medical history, past social history, past surgical history and problem list     Review of Systems    Patient was seen and examined at bedside  The patient denies any dizziness, fever,chills, pain, headache, blurry vision, chest pain, palpitation, shortness of breath, N/V, abd pain  He said he has constipation from use of oxybutynin which has been well controlled with taking miralax every other day  Objective:      /66 (BP Location: Right arm, Patient Position: Sitting, Cuff Size: Standard)   Pulse (!) 50   Temp (!) 97 °F (36 1 °C) (Temporal)   Resp 16   Ht 6' 1" (1 854 m)   Wt 92 4 kg (203 lb 12 8 oz)   SpO2 98%   BMI 26 89 kg/m²          Physical Exam    Please see attending's note

## 2021-04-01 NOTE — ASSESSMENT & PLAN NOTE
-on oxybutynin, experience dry mouth, dry eyes, constipation which are manageable   -pt is considering urolift, follows uro

## 2021-04-01 NOTE — ASSESSMENT & PLAN NOTE
-asymptomatic, on aspirin  -h/o ablation in 2014, well controlled since then and not on [facility]  -follows with cardio  -will need LIOR control

## 2021-04-01 NOTE — ASSESSMENT & PLAN NOTE
-present for 20years  -well control with metrogel  -dermato referral for further Mx and annual skin check

## 2021-04-01 NOTE — ASSESSMENT & PLAN NOTE
-pt was diagnosed with mild to mod LIOR - 1st sleep study in 11/20, 2nd in 02/21, recommended to use CPAP pressure 7  -however, pt wants to try oral appliance first  -seen by dentist, already set up appts for fitting etc   -If fail, will consider CPAP

## 2021-04-01 NOTE — ASSESSMENT & PLAN NOTE
-stable, asymptomatic  -Recent holter in 1/21 showed PACs, PVCs jenny ventricular bigeminy  -next cardio follow up in a year

## 2021-04-14 ENCOUNTER — TELEPHONE (OUTPATIENT)
Dept: UROLOGY | Facility: CLINIC | Age: 81
End: 2021-04-14

## 2021-04-14 ENCOUNTER — CONSULT (OUTPATIENT)
Dept: UROLOGY | Facility: CLINIC | Age: 81
End: 2021-04-14
Payer: MEDICARE

## 2021-04-14 VITALS
HEIGHT: 73 IN | WEIGHT: 203 LBS | HEART RATE: 56 BPM | SYSTOLIC BLOOD PRESSURE: 122 MMHG | BODY MASS INDEX: 26.9 KG/M2 | DIASTOLIC BLOOD PRESSURE: 62 MMHG

## 2021-04-14 DIAGNOSIS — N32.81 OAB (OVERACTIVE BLADDER): ICD-10-CM

## 2021-04-14 DIAGNOSIS — C67.8 MALIGNANT NEOPLASM OF OVERLAPPING SITES OF BLADDER (HCC): ICD-10-CM

## 2021-04-14 DIAGNOSIS — N40.1 BENIGN PROSTATIC HYPERPLASIA WITH LOWER URINARY TRACT SYMPTOMS, SYMPTOM DETAILS UNSPECIFIED: Primary | ICD-10-CM

## 2021-04-14 PROCEDURE — 99204 OFFICE O/P NEW MOD 45 MIN: CPT | Performed by: UROLOGY

## 2021-04-14 RX ORDER — FINASTERIDE 5 MG/1
5 TABLET, FILM COATED ORAL DAILY
Qty: 90 TABLET | Refills: 3 | Status: SHIPPED | OUTPATIENT
Start: 2021-04-14 | End: 2021-04-19

## 2021-04-14 RX ORDER — TAMSULOSIN HYDROCHLORIDE 0.4 MG/1
0.4 CAPSULE ORAL
Qty: 90 CAPSULE | Refills: 3 | Status: SHIPPED | OUTPATIENT
Start: 2021-04-14 | End: 2021-04-19

## 2021-04-14 NOTE — TELEPHONE ENCOUNTER
PT FILLED OUT RECORDS RELEASE FOR AT THE OFFICE AFTER HIS APPT AND IT WAS FAXED TO DR Rupa Jameson [phone]  RELEASE FORM SCANNED INTO MEDIA

## 2021-04-14 NOTE — PROGRESS NOTES
Referring Physician: Tomasa Donaldson MD  A copy of this note was sent to the referring physician  Diagnoses and all orders for this visit:    Benign prostatic hyperplasia with lower urinary tract symptoms, symptom details unspecified  -     Ambulatory referral to Urology  -     finasteride (PROSCAR) 5 mg tablet; Take 1 tablet (5 mg total) by mouth daily  -     tamsulosin (FLOMAX) 0 4 mg; Take 1 capsule (0 4 mg total) by mouth daily with dinner  -     Mirabegron ER 25 MG TB24; Take 25 mg by mouth daily at bedtime    Malignant neoplasm of overlapping sites of bladder [facility])  -     Ambulatory referral to Urology    OAB (overactive bladder)            Assessment and plan:       1  Medically refractory lower urinary tract symptoms  - currently managed with trimotile medical therapy (tamsulosin, finasteride, oxybutynin XL)  -  Patient is interested in the Uro lift procedure    2  Distant history of bladder cancer 1998  - discussed that surveillance cystoscopy is no longer indicated a bladder cancer standpoint     patient is interested in surgical management of his BPH  His goal is to minimize polypharmacy and I believe this is very appropriate  He is very interested in the Uro lift procedure  I have recommended cystoscopy and transrectal ultrasound for evaluation of surgical correction to his BPH with a goal of discontinuing some of his medications  I recommended flexible cystoscopy for further evaluation  Discussed the risks benefits and alternatives to this diagnostic procedure  Risks include but are not limited to hematuria, pain, urinary tract infection, stricture formation, possible need for hospitalization  Patient verbalized understanding and has elected to proceed  Cystoscopy , transrectal ultrasound, uroflow, postvoid residual will be scheduled in the near future       in the meantime I recommended transitioning from his oxybutynin to a beta 3 agonist    He has had dose limiting dry mouth with the anticholinergic  Prescription was sent electronically and his tamsulosin and finasteride were renewed as well    Shayan Menjivar MD      Chief Complaint       Transfer of care BPH      History of Present Illness     Zachary Hill II is a 78 y o  Male referred as a transfer of care  He is a very pleasant highly functional 49-year-old male with a longstanding history of urinary issues  He has known BPH as well as significant urgency and frequency  He is currently managed on extensive triple medical therapy including tamsulosin, finasteride, as well as long-acting oxybutynin  Patient feels he is doing well from a  Urinary standpoint, specifically he has had resolved urge incontinence since initiating the oxybutynin  However he has had dose limiting dry mouth which is a major and no answer for him and he is also, appropriately, concerned about polypharmacy  As such the patient has investigated minimally invasive surgical solutions for his BPH  He understands this would allow for discontinuation of some potentially all of his  Medications  Patient also has a distant history of bladder cancer which was diagnosed in 1998  He has had regular cystoscopy including on an annual basis since that time with no evidence of recurrence since the time of initial diagnosis  He is newly relocated to the area from [facility]  He has had no hematuria or urinary tract infections  Detailed Urologic History     - please refer to HPI    Review of Systems     Review of Systems   Constitutional: Negative for activity change and fatigue  HENT: Negative for congestion  Eyes: Negative for visual disturbance  Respiratory: Negative for shortness of breath and wheezing  Cardiovascular: Negative for chest pain and leg swelling  Gastrointestinal: Negative for abdominal pain  Endocrine: Positive for polyuria  Genitourinary: Positive for dysuria   Negative for flank pain, hematuria and urgency  Musculoskeletal: Negative for back pain  Allergic/Immunologic: Negative for immunocompromised state  Neurological: Negative for dizziness and numbness  Psychiatric/Behavioral: Negative for dysphoric mood  All other systems reviewed and are negative  Allergies     Allergies   Allergen Reactions    Sulfa Antibiotics Hives       Physical Exam     Physical Exam  Constitutional:       General: He is not in acute distress  Appearance: He is well-developed  HENT:      Head: Normocephalic and atraumatic  Neck:      Musculoskeletal: Normal range of motion  Cardiovascular:      Comments: Negative lower extremity edema  Pulmonary:      Effort: Pulmonary effort is normal       Breath sounds: Normal breath sounds  Abdominal:      Palpations: Abdomen is soft  Musculoskeletal: Normal range of motion  Skin:     General: Skin is warm  Neurological:      Mental Status: He is alert and oriented to person, place, and time  Psychiatric:         Behavior: Behavior normal              Vital Signs  There were no vitals filed for this visit        Current Medications       Current Outpatient Medications:     aspirin (ECOTRIN LOW STRENGTH) 81 mg EC tablet, Take 81 mg by mouth daily, Disp: , Rfl:     atorvastatin (LIPITOR) 20 mg tablet, Take 20 mg by mouth daily, Disp: , Rfl:     cholecalciferol (VITAMIN D3) 1,000 units tablet, Take 1,000 Units by mouth daily, Disp: , Rfl:     Coenzyme Q10 (CoQ10) 100 MG CAPS, Take by mouth daily , Disp: , Rfl:     cyanocobalamin (VITAMIN B-12) 100 mcg tablet, Take by mouth daily, Disp: , Rfl:     finasteride (PROSCAR) 5 mg tablet, Take 5 mg by mouth daily, Disp: , Rfl:     Glucosamine-Chondroit-Vit C-Mn (GLUCOSAMINE CHONDR 1500 COMPLX PO), Take by mouth daily , Disp: , Rfl:     metroNIDAZOLE (METROCREAM) 0 75 % cream, Apply topically 2 (two) times a day (Patient not taking: Reported on 4/1/2021), Disp: 45 g, Rfl: 3    metroNIDAZOLE (METROGEL) 0 75 % gel, , Disp: , Rfl:     Multiple Vitamin (multivitamin) capsule, Take 1 capsule by mouth daily, Disp: , Rfl:     Omega 3 1000 MG CAPS, Take by mouth daily , Disp: , Rfl:     oxybutynin (DITROPAN-XL) 10 MG 24 hr tablet, Take 10 mg by mouth daily at bedtime, Disp: , Rfl:     tamsulosin (FLOMAX) 0 4 mg, Take 0 4 mg by mouth daily with dinner, Disp: , Rfl:       Active Problems     Patient Active Problem List   Diagnosis    Coronary artery disease due to calcified coronary lesion    Malignant neoplasm of overlapping sites of bladder ([facility] [address] )    Pure hypercholesterolemia    Urge incontinence    Benign prostatic hyperplasia with lower urinary tract symptoms    H/O laminectomy    Other fatigue    Stage 3a chronic kidney disease    LIOR (obstructive sleep apnea)    PLMD (periodic limb movement disorder)    Sinus bradycardia    PVC (premature ventricular contraction)    Bigeminy    Rosacea    Paroxysmal atrial fibrillation (HCC)    Status post radiofrequency ablation (RFA) operation for arrhythmia    Decreased hearing of both ears         Past Medical History     No past medical history on file  Surgical History     Past Surgical History:   Procedure Laterality Date    CATARACT EXTRACTION, BILATERAL Bilateral [id number]         Family History     No family history on file        Social History     Social History     Social History     Tobacco Use   Smoking Status Former Smoker    Types: Cigarettes    Start date: 10/8/1961   [facility] Quit date: 10/8/1977    Years since quittin 5   Smokeless Tobacco Never Used         Pertinent Lab Values     Lab Results   Component Value Date    CREATININE 1 24 2021       No results found for: PSA    @RESULTRCNT(1H])@      Pertinent Imaging      - n/a    Portions of the record may have been created with voice recognition software   Occasional wrong word or "sound a like" substitutions may have occurred due to the inherent limitations of voice recognition software   Read the chart carefully and recognize, using context, where substitutions have occurred

## 2021-04-14 NOTE — TELEPHONE ENCOUNTER
PT NEEDS TO BE SET UP FOR Return for cysto TRUS uroflow PVR  DR Kamilah Santamaria SAID TO SCHEDULE HIM IF A DAY GETS OPENED UP  HE WANTS IT SOONER THEN LATER  DR Kamilah Santamaria IS POSSIBLY OPENING UP A DAY SOON

## 2021-04-15 ENCOUNTER — TELEPHONE (OUTPATIENT)
Dept: UROLOGY | Facility: MEDICAL CENTER | Age: 81
End: 2021-04-15

## 2021-04-15 DIAGNOSIS — N32.81 OAB (OVERACTIVE BLADDER): Primary | ICD-10-CM

## 2021-04-15 RX ORDER — TROSPIUM CHLORIDE 20 MG/1
20 TABLET, FILM COATED ORAL 2 TIMES DAILY
Qty: 60 TABLET | Refills: 0 | Status: SHIPPED | OUTPATIENT
Start: 2021-04-15 | End: 2021-04-19

## 2021-04-15 NOTE — TELEPHONE ENCOUNTER
Patient was seen by Dr Fer Lima yesterday  Dr Fer Liam recommended transitioning from his Oxybutynin to Mirabegron  The Mirabegron is almost $800  Is there a generic for this medication? Patient cannot pay that much for a medication  Patient can be reached at [phone]

## 2021-04-15 NOTE — TELEPHONE ENCOUNTER
Merged with [facility] relaying provider information and recommendations  Encouraged to call office with questions or concerns and office number given  Advised to start with Trospium but are other medications should it be too expensive

## 2021-04-15 NOTE — TELEPHONE ENCOUNTER
Agree that's far too much for medication  I sent trospium 20mg bid as alternative   Could try vesicare or detrol too, but will see what pricing comes out to, start with trospium

## 2021-04-19 ENCOUNTER — PROCEDURE VISIT (OUTPATIENT)
Dept: UROLOGY | Facility: CLINIC | Age: 81
End: 2021-04-19
Payer: MEDICARE

## 2021-04-19 VITALS
HEART RATE: 88 BPM | BODY MASS INDEX: 26.51 KG/M2 | DIASTOLIC BLOOD PRESSURE: 78 MMHG | WEIGHT: 200 LBS | HEIGHT: 73 IN | SYSTOLIC BLOOD PRESSURE: 128 MMHG

## 2021-04-19 DIAGNOSIS — N40.1 BENIGN PROSTATIC HYPERPLASIA WITH LOWER URINARY TRACT SYMPTOMS, SYMPTOM DETAILS UNSPECIFIED: ICD-10-CM

## 2021-04-19 DIAGNOSIS — N32.81 OAB (OVERACTIVE BLADDER): ICD-10-CM

## 2021-04-19 DIAGNOSIS — C67.8 MALIGNANT NEOPLASM OF OVERLAPPING SITES OF BLADDER (HCC): Primary | ICD-10-CM

## 2021-04-19 LAB — POST-VOID RESIDUAL VOLUME, ML POC: 12 ML

## 2021-04-19 PROCEDURE — 51798 US URINE CAPACITY MEASURE: CPT | Performed by: UROLOGY

## 2021-04-19 PROCEDURE — 99214 OFFICE O/P EST MOD 30 MIN: CPT | Performed by: UROLOGY

## 2021-04-19 PROCEDURE — 76872 US TRANSRECTAL: CPT | Performed by: UROLOGY

## 2021-04-19 PROCEDURE — 52000 CYSTOURETHROSCOPY: CPT | Performed by: UROLOGY

## 2021-04-19 RX ORDER — FINASTERIDE 5 MG/1
5 TABLET, FILM COATED ORAL DAILY
Qty: 90 TABLET | Refills: 3 | Status: SHIPPED | OUTPATIENT
Start: 2021-04-19 | End: 2021-05-28 | Stop reason: HOSPADM

## 2021-04-19 RX ORDER — TROSPIUM CHLORIDE 20 MG/1
20 TABLET, FILM COATED ORAL 2 TIMES DAILY
Qty: 60 TABLET | Refills: 3 | Status: SHIPPED | OUTPATIENT
Start: 2021-04-19 | End: 2022-07-06

## 2021-04-19 RX ORDER — TAMSULOSIN HYDROCHLORIDE 0.4 MG/1
0.4 CAPSULE ORAL
Qty: 90 CAPSULE | Refills: 3 | Status: SHIPPED | OUTPATIENT
Start: 2021-04-19 | End: 2021-05-28 | Stop reason: HOSPADM

## 2021-04-19 NOTE — PROGRESS NOTES
Office TRUS    Indication    Prostate volumetrics for surgical planning    Transrectal ultrasonography  The patient was placed in the left lateral decubitus position  After an attentive digital rectal examination, a 7 5 mHz sidefire ultrasound probe was gently inserted into the rectum and biplanar imaging of the prostate was done with the findings noted below  Images were taken of any abnormal findings and also to document prostate size      Bladder  The bladder base appeared normal     Prostate      Ultrasound size measurements:  -Volume:  50 cm3      Ultrasound findings:  -Cysts: None  -Masses: None  -Median lobe: absent

## 2021-04-19 NOTE — PROGRESS NOTES
Referring Physician: Moustapha Johnson MD  A copy of this note was sent to the referring physician  Diagnoses and all orders for this visit:    Malignant neoplasm of overlapping sites of bladder ([facility] [address] )    Benign prostatic hyperplasia with lower urinary tract symptoms, symptom details unspecified  -     POCT Measure PVR  -     Case request operating room: CYSTOSCOPY WITH INSERTION UROLIFT; Standing    OAB (overactive bladder)  -     POCT Measure PVR  -     Case request operating room: CYSTOSCOPY WITH INSERTION UROLIFT; Standing    Other orders  -     Cancel: Cytology, urine; Future  -     Diet NPO; Sips with meds; Standing  -     Place sequential compression device; Standing  -     ceFAZolin (ANCEF) 2,000 mg in dextrose 5 % 100 mL IVPB            Assessment and plan:       1  Medically refractory lower urinary tract symptoms  - currently managed with trimotile medical therapy (tamsulosin, finasteride, oxybutynin XL)  -  Patient is interested in the Uro lift procedure    2  Distant history of bladder cancer 1998  - discussed that surveillance cystoscopy is no longer indicated a bladder cancer standpoint      We reviewed the options for treating BPH/LUTS which include but are not limited to expectant management, medical therapy, transurethral resection of prostate (TURP)  We also discussed minimally invasive options  At this point, the patient wishes to proceed with Urolift    This is a great option for the patient based on the following criteria:    - cystoscopy revealed  Bilobar hyperplasia  - estimated gland volume  50 g  - AUA SS 11  - Bother index: 3  - normal renal function  - no active urinary tract infection  - PSA:  No longer applicable as patient is outside of recommended screening criteria due to age  - no documented allergy to nickel    - He has failed the following treatment options:  Tamsulosin, finasteride, oxybutinin  The additional morbidity of standard surgical options (ie, TURP) is not necessary given the above criteria  The risks of Urolift include but are not limited to bleeding, infection, reaction to anesthesia such as heart attack, stroke, DVT/PE, hyponatremia, bladder neck contracture, urethral stricture, injury to surrounding structures (ureters, rectum, etc)  We discussed that additional risks of trans urethral resection procedures such as incontinence, retrograde ejaculation, and erectile dysfunction have been only rarely reported with the Uro lift procedure  We did discuss that this is a new were procedure and a permanent implant  We discussed that there may be some long-term implications that her on for seen with this newer technology, such as perhaps complicating treatment for prostate cancer if indicated down the line  Finally, I told him that he may require additional procedures secondary to some of these complications  Informed consent was obtained for the Uro lift procedure  This will be scheduled in the near future to be performed under IV sedation  in the meantime will submit a prior authorization request for Myrbetriq as the patient  Has trialed oxybutynin for number years, has failed due to persistent incontinence as well as dose limiting constipation and dry mouth      Shayan Menjivar MD      Chief Complaint       BPH workup      History of Present Illness     Zachary Hill II is a 78 y o  Male referred as a transfer of care  In brief He is a very pleasant highly functional 42-year-old male with a longstanding history of urinary issues  He has known BPH as well as significant urgency and frequency  He is currently managed on extensive triple medical therapy including tamsulosin, finasteride, as well as long-acting oxybutynin  Patient feels he is doing well from a  Urinary standpoint, specifically he has had resolved urge incontinence since initiating the oxybutynin    However he has had dose limiting dry mouth which is a major and no answer for him and he is also, appropriately, concerned about polypharmacy  As such the patient has investigated minimally invasive surgical solutions for his BPH  He understands this would allow for discontinuation of some potentially all of his  Medications  Patient also has a distant history of bladder cancer which was diagnosed in 1998  He has had regular cystoscopy including on an annual basis since that time with no evidence of recurrence since the time of initial diagnosis  patient presents for cystoscopy and transrectal ultrasound having previously been counseled on procedure in detail  In the interim he has investigated oxybutynin with his prescription drug plan  Medication was cost prohibitive with a high out-of-pocket cost   He continues to experience constipation dry mouth which have been dose limiting with the oxybutynin and it is also poorly efficacious with refractory urge urinary incontinence      Detailed Urologic History     - please refer to HPI    Review of Systems     Review of Systems   Constitutional: Negative for activity change and fatigue  HENT: Negative for congestion  Eyes: Negative for visual disturbance  Respiratory: Negative for shortness of breath and wheezing  Cardiovascular: Negative for chest pain and leg swelling  Gastrointestinal: Negative for abdominal pain  Endocrine: Positive for polyuria  Genitourinary: Positive for dysuria  Negative for flank pain, hematuria and urgency  Musculoskeletal: Negative for back pain  Allergic/Immunologic: Negative for immunocompromised state  Neurological: Negative for dizziness and numbness  Psychiatric/Behavioral: Negative for dysphoric mood  All other systems reviewed and are negative  AUA SYMPTOM SCORE      Most Recent Value   AUA SYMPTOM SCORE   How often have you had a sensation of not emptying your bladder completely after you finished urinating?   1   How often have you had to urinate again less than two hours after you finished urinating? 1   How often have you found you stopped and started again several times when you urinate? 1   How often have you found it difficult to postpone urination? 3   How often have you had a weak urinary stream?  3   How often have you had to push or strain to begin urination? 0   How many times did you most typically get up to urinate from the time you went to bed at night until the time you got up in the morning? 2   Quality of Life: If you were to spend the rest of your life with your urinary condition just the way it is now, how would you feel about that?  3   AUA SYMPTOM SCORE  11              Allergies     Allergies   Allergen Reactions    Sulfa Antibiotics Hives       Physical Exam     Physical Exam  Constitutional:       General: He is not in acute distress  Appearance: He is well-developed  HENT:      Head: Normocephalic and atraumatic  Neck:      Musculoskeletal: Normal range of motion  Cardiovascular:      Comments: Negative lower extremity edema  Pulmonary:      Effort: Pulmonary effort is normal       Breath sounds: Normal breath sounds  Abdominal:      Palpations: Abdomen is soft  Genitourinary:     Penis: Normal        Prostate: Normal    Musculoskeletal: Normal range of motion  Skin:     General: Skin is warm  Neurological:      Mental Status: He is alert and oriented to person, place, and time     Psychiatric:         Behavior: Behavior normal              Vital Signs  Vitals:    04/19/21 0801   Weight: 90 7 kg (200 lb)   Height: 6' 1" (1 854 m)         Current Medications       Current Outpatient Medications:     aspirin (ECOTRIN LOW STRENGTH) 81 mg EC tablet, Take 81 mg by mouth daily, Disp: , Rfl:     atorvastatin (LIPITOR) 20 mg tablet, Take 20 mg by mouth daily, Disp: , Rfl:     cholecalciferol (VITAMIN D3) 1,000 units tablet, Take 1,000 Units by mouth daily, Disp: , Rfl:     Coenzyme Q10 (CoQ10) 100 MG CAPS, Take by mouth daily , Disp: , Rfl:     cyanocobalamin (VITAMIN B-12) 100 mcg tablet, Take by mouth daily, Disp: , Rfl:     finasteride (PROSCAR) 5 mg tablet, Take 1 tablet (5 mg total) by mouth daily, Disp: 90 tablet, Rfl: 3    Glucosamine-Chondroit-Vit C-Mn (GLUCOSAMINE CHONDR 1500 COMPLX PO), Take by mouth daily , Disp: , Rfl:     metroNIDAZOLE (METROCREAM) 0 75 % cream, Apply topically 2 (two) times a day, Disp: 45 g, Rfl: 3    metroNIDAZOLE (METROGEL) 0 75 % gel, , Disp: , Rfl:     Multiple Vitamin (multivitamin) capsule, Take 1 capsule by mouth daily, Disp: , Rfl:     Omega 3 1000 MG CAPS, Take by mouth daily , Disp: , Rfl:     tamsulosin (FLOMAX) 0 4 mg, Take 1 capsule (0 4 mg total) by mouth daily with dinner, Disp: 90 capsule, Rfl: 3    trospium chloride (SANCTURA) 20 mg tablet, Take 1 tablet (20 mg total) by mouth 2 (two) times a day, Disp: 60 tablet, Rfl: 0      Active Problems     Patient Active Problem List   Diagnosis    Coronary artery disease due to calcified coronary lesion    Malignant neoplasm of overlapping sites of bladder ([facility] [address] )    Pure hypercholesterolemia    Urge incontinence    Benign prostatic hyperplasia with lower urinary tract symptoms    H/O laminectomy    Other fatigue    Stage 3a chronic kidney disease    LIOR (obstructive sleep apnea)    PLMD (periodic limb movement disorder)    Sinus bradycardia    PVC (premature ventricular contraction)    Bigeminy    Rosacea    Paroxysmal atrial fibrillation (HCC)    Status post radiofrequency ablation (RFA) operation for arrhythmia    Decreased hearing of both ears    OAB (overactive bladder)         Past Medical History     No past medical history on file  Surgical History     Past Surgical History:   Procedure Laterality Date    CATARACT EXTRACTION, BILATERAL Bilateral [id number]         Family History     No family history on file        Social History     Social History     Social History     Tobacco Use   Smoking Status Former Smoker    Types: Cigarettes    Start date: 10/8/1961   [facility] Quit date: 10/8/1977    Years since quittin 5   Smokeless Tobacco Never Used         Pertinent Lab Values     Lab Results   Component Value Date    CREATININE 2021       No results found for: PSA    @RESULTRCNT(1H])@      Pertinent Imaging      - n/a    Portions of the record may have been created with voice recognition software   Occasional wrong word or "sound a like" substitutions may have occurred due to the inherent limitations of voice recognition software   Read the chart carefully and recognize, using context, where substitutions have occurred

## 2021-04-19 NOTE — PROGRESS NOTES
Office Cystoscopy Procedure Note    Indication:     medically refractory lower urinary tract symptoms     Informed consent   The risks, benefits, complications, treatment options, and expected outcomes were discussed with the patient  The patient concurred with the proposed plan and provided informed consent  Anesthesia  Lidocaine jelly 2%    Antibiotic prophylaxis   None    Procedure  The patient was placed in the supineposition, was prepped and draped in the usual manner using sterile technique, and 2% lidocaine jelly instilled into the urethra  A 17 F flexible cystoscope was then inserted into the urethra and the urethra and bladder carefully examined  The following findings were noted:    Findings:  Urethra:  Normal  Prostate:   bilobar hyperplasia with a 9% kissing lateral lobes in the midline  Bladder neck is mildly elevated  There is no median lobe  Bladder:    Generally well-preserved grade 1-2 trabeculation throughout  Ureteral orifices:  Normal  Other findings:  None     Specimens: None                 Complications:    None; patient tolerated the procedure well           Disposition: To home after 30 minute observation             Condition: Stable       Cystoscopy     Date/Time 4/19/2021 9:06 AM     Performed by  Ab Covington MD     Authorized by Ab Covington MD          Procedure Details:  Procedure type: cystoscopy

## 2021-04-22 ENCOUNTER — PREP FOR PROCEDURE (OUTPATIENT)
Dept: UROLOGY | Facility: CLINIC | Age: 81
End: 2021-04-22

## 2021-04-22 ENCOUNTER — TELEPHONE (OUTPATIENT)
Dept: UROLOGY | Facility: CLINIC | Age: 81
End: 2021-04-22

## 2021-04-22 DIAGNOSIS — N40.1 BENIGN PROSTATIC HYPERPLASIA WITH LOWER URINARY TRACT SYMPTOMS, SYMPTOM DETAILS UNSPECIFIED: Primary | ICD-10-CM

## 2021-04-22 NOTE — TELEPHONE ENCOUNTER
Spoke w patient and he is sched for 6/25 at the [facility]  We went over multiple dates and as of now tis works best but may call to change to May 28th  He is aware he needs a  and the hospital will contact him day prior w time of arrival  He will go for UCX 2 wks prior to surgery  Advised 7 day hold of aspirin products, multivitamins and fish oils  I am mailing him a surgical packet and he will contact us to change dates or with concerns

## 2021-04-22 NOTE — TELEPHONE ENCOUNTER
Patient called to confirm that medications that were recommended at last office visit on 4/19/2021 were ordered  Assured him that orders for finasteride, flomax, and trospium chloride are all ordered from 4/19/2021 to  [facility]  Patient repeats back understanding and thanks us for our time

## 2021-04-27 ENCOUNTER — TELEPHONE (OUTPATIENT)
Dept: UROLOGY | Facility: MEDICAL CENTER | Age: 81
End: 2021-04-27

## 2021-04-27 NOTE — TELEPHONE ENCOUNTER
You Just now (44:36 AM)        Duplicate Encounter, please see my prior encounter  Documentation       Susi Hartman MA routed conversation to You 1 hour ago (10:29 AM)      uSsi Hartman MA 1 hour ago (10:29 AM)        Patient would like a call back [phone]   He would like to move up his surgery date            Documentation       Darrell Jorge [phone]  Susi Hartman MA

## 2021-04-27 NOTE — TELEPHONE ENCOUNTER
Spoke w patient and we have RS him for 5/28 he is aware to go to 2 wks prior for UCX  He will contact us w any other questions or concerns

## 2021-05-04 DIAGNOSIS — N40.1 BENIGN PROSTATIC HYPERPLASIA WITH LOWER URINARY TRACT SYMPTOMS, SYMPTOM DETAILS UNSPECIFIED: Primary | ICD-10-CM

## 2021-05-04 RX ORDER — TAMSULOSIN HYDROCHLORIDE 0.4 MG/1
0.4 CAPSULE ORAL
Qty: 15 CAPSULE | Refills: 0 | Status: SHIPPED | OUTPATIENT
Start: 2021-05-04 | End: 2021-05-25

## 2021-05-04 NOTE — TELEPHONE ENCOUNTER
Patient requesting a 2 week supply of medication to hold him over until the [address] [facility] [employer] releases his package        Script for the requested medication was queued and forwarded to the Advanced Practitioner covering the [facility] for approval

## 2021-05-04 NOTE — TELEPHONE ENCOUNTER
Patient of Dr River Mello in [place]     patient called stating he has not been able to get his prescription due  A delay in the mail  Can the prescription be sent to   [facility]  He just needs at least 2 weeks supply       finasteride (PROSCAR) 5 mg tablet

## 2021-05-05 RX ORDER — FINASTERIDE 5 MG/1
5 TABLET, FILM COATED ORAL DAILY
Qty: 15 TABLET | Refills: 0 | Status: SHIPPED | OUTPATIENT
Start: 2021-05-05 | End: 2021-05-25

## 2021-05-05 NOTE — TELEPHONE ENCOUNTER
My mistake    The correct medication, Finasteride 5mg, was queued and forwarded to the Advanced Practitioner covering the [facility] location for approval

## 2021-05-05 NOTE — TELEPHONE ENCOUNTER
Patient called in stating the incorrect medication was ordered yesterday   The correct medication is finasteride (PROSCAR) 5 mg table  Which should go to [address] [place], [address] [place] [id number]  Phone:  [phone]

## 2021-05-14 ENCOUNTER — ANESTHESIA EVENT (OUTPATIENT)
Dept: PERIOP | Facility: AMBULARY SURGERY CENTER | Age: 81
End: 2021-05-14
Payer: MEDICARE

## 2021-05-14 ENCOUNTER — APPOINTMENT (OUTPATIENT)
Dept: LAB | Facility: AMBULARY SURGERY CENTER | Age: 81
End: 2021-05-14
Attending: UROLOGY
Payer: MEDICARE

## 2021-05-14 DIAGNOSIS — N40.1 BENIGN PROSTATIC HYPERPLASIA WITH LOWER URINARY TRACT SYMPTOMS, SYMPTOM DETAILS UNSPECIFIED: ICD-10-CM

## 2021-05-14 PROCEDURE — 87086 URINE CULTURE/COLONY COUNT: CPT

## 2021-05-15 LAB — BACTERIA UR CULT: NORMAL

## 2021-05-25 NOTE — PRE-PROCEDURE INSTRUCTIONS
Pre-Surgery Instructions:   Medication Instructions    aspirin (ECOTRIN LOW STRENGTH) 81 mg EC tablet Patient was instructed by Physician and understands   atorvastatin (LIPITOR) 20 mg tablet Instructed to take as needed including DOS with sips water    cholecalciferol (VITAMIN D3) 1,000 units tablet Instructed to avoid all ASA/NSAIDs and OTC Vit/Supp from now until after surgery  Tylenol ok prn    Coenzyme Q10 (CoQ10) 100 MG CAPS Instructed to avoid all ASA/NSAIDs and OTC Vit/Supp from now until after surgery  Tylenol ok prn    cyanocobalamin (VITAMIN B-12) 100 mcg tablet Instructed to avoid all ASA/NSAIDs and OTC Vit/Supp from now until after surgery  Tylenol ok prn    finasteride (PROSCAR) 5 mg tablet Instructed to take per normal schedule including DOS with sips water    Glucosamine-Chondroit-Vit C-Mn (GLUCOSAMINE CHONDR 1500 COMPLX PO) Instructed to avoid all ASA/NSAIDs and OTC Vit/Supp from now until after surgery  Tylenol ok prn    Multiple Vitamin (multivitamin) capsule Instructed to avoid all ASA/NSAIDs and OTC Vit/Supp from now until after surgery  Tylenol ok prn    Omega 3 1000 MG CAPS Instructed to avoid all ASA/NSAIDs and OTC Vit/Supp from now until after surgery  Tylenol ok prn    tamsulosin (FLOMAX) 0 4 mg Instructed to take per normal schedule including DOS with sips water    trospium chloride (SANCTURA) 20 mg tablet Instructed to take per normal schedule    Pre-op medication, and showering instructions with antibacteral soap reviewed  Instructed to avoid all NSAIDs and OTC Vit/Supp from now until after surgery  Tylenol ok prn Pt did not receive  Instructions on stopping his ASA Cardiologist messaged  Pt  Verbalized an understanding of all instructions reviewed and offers no concerns at this time

## 2021-05-28 ENCOUNTER — ANESTHESIA (OUTPATIENT)
Dept: PERIOP | Facility: AMBULARY SURGERY CENTER | Age: 81
End: 2021-05-28
Payer: MEDICARE

## 2021-05-28 ENCOUNTER — HOSPITAL ENCOUNTER (OUTPATIENT)
Facility: AMBULARY SURGERY CENTER | Age: 81
Setting detail: OUTPATIENT SURGERY
Discharge: HOME | End: 2021-05-28
Attending: UROLOGY | Admitting: UROLOGY
Payer: MEDICARE

## 2021-05-28 VITALS
DIASTOLIC BLOOD PRESSURE: 70 MMHG | RESPIRATION RATE: 18 BRPM | HEART RATE: 45 BPM | HEIGHT: 73 IN | OXYGEN SATURATION: 98 % | BODY MASS INDEX: 27.7 KG/M2 | WEIGHT: 209 LBS | TEMPERATURE: 97.4 F | SYSTOLIC BLOOD PRESSURE: 144 MMHG

## 2021-05-28 DIAGNOSIS — N40.1 BENIGN PROSTATIC HYPERPLASIA WITH LOWER URINARY TRACT SYMPTOMS, SYMPTOM DETAILS UNSPECIFIED: Primary | ICD-10-CM

## 2021-05-28 PROCEDURE — 52442 CYSTO INS TRNSPRSTC IMPLT EA: CPT | Performed by: UROLOGY

## 2021-05-28 PROCEDURE — 52441 CYSTO INSJ TRNSPRSTC 1 IMPLT: CPT | Performed by: UROLOGY

## 2021-05-28 PROCEDURE — NC001 PR NO CHARGE: Performed by: UROLOGY

## 2021-05-28 PROCEDURE — L8699 PROSTHETIC IMPLANT NOS: HCPCS | Performed by: UROLOGY

## 2021-05-28 DEVICE — UROLIFT SYSTEM
Type: IMPLANTABLE DEVICE | Site: URETHRA | Status: FUNCTIONAL
Brand: UROLIFT

## 2021-05-28 RX ORDER — MAGNESIUM HYDROXIDE 1200 MG/15ML
LIQUID ORAL AS NEEDED
Status: DISCONTINUED | OUTPATIENT
Start: 2021-05-28 | End: 2021-05-28 | Stop reason: HOSPADM

## 2021-05-28 RX ORDER — SODIUM CHLORIDE, SODIUM LACTATE, POTASSIUM CHLORIDE, CALCIUM CHLORIDE 600; 310; 30; 20 MG/100ML; MG/100ML; MG/100ML; MG/100ML
INJECTION, SOLUTION INTRAVENOUS CONTINUOUS PRN
Status: DISCONTINUED | OUTPATIENT
Start: 2021-05-28 | End: 2021-05-28

## 2021-05-28 RX ORDER — PROMETHAZINE HYDROCHLORIDE 25 MG/ML
12.5 INJECTION, SOLUTION INTRAMUSCULAR; INTRAVENOUS ONCE AS NEEDED
Status: DISCONTINUED | OUTPATIENT
Start: 2021-05-28 | End: 2021-05-28 | Stop reason: HOSPADM

## 2021-05-28 RX ORDER — ACETAMINOPHEN 325 MG/1
650 TABLET ORAL EVERY 4 HOURS PRN
Qty: 30 TABLET | Refills: 0
Start: 2021-05-28 | End: 2021-06-01 | Stop reason: ALTCHOICE

## 2021-05-28 RX ORDER — FENTANYL CITRATE 50 UG/ML
INJECTION, SOLUTION INTRAMUSCULAR; INTRAVENOUS AS NEEDED
Status: DISCONTINUED | OUTPATIENT
Start: 2021-05-28 | End: 2021-05-28

## 2021-05-28 RX ORDER — HYDRALAZINE HYDROCHLORIDE 20 MG/ML
5 INJECTION INTRAMUSCULAR; INTRAVENOUS
Status: DISCONTINUED | OUTPATIENT
Start: 2021-05-28 | End: 2021-05-28 | Stop reason: HOSPADM

## 2021-05-28 RX ORDER — CEFAZOLIN SODIUM 2 G/50ML
2000 SOLUTION INTRAVENOUS ONCE
Status: COMPLETED | OUTPATIENT
Start: 2021-05-28 | End: 2021-05-28

## 2021-05-28 RX ORDER — FENTANYL CITRATE/PF 50 MCG/ML
25 SYRINGE (ML) INJECTION
Status: DISCONTINUED | OUTPATIENT
Start: 2021-05-28 | End: 2021-05-28 | Stop reason: HOSPADM

## 2021-05-28 RX ORDER — DOCUSATE SODIUM 100 MG/1
100 CAPSULE, LIQUID FILLED ORAL 2 TIMES DAILY
Qty: 30 CAPSULE | Refills: 0 | Status: SHIPPED | OUTPATIENT
Start: 2021-05-28 | End: 2022-07-19

## 2021-05-28 RX ORDER — HYDROMORPHONE HCL/PF 1 MG/ML
0.5 SYRINGE (ML) INJECTION
Status: DISCONTINUED | OUTPATIENT
Start: 2021-05-28 | End: 2021-05-28 | Stop reason: HOSPADM

## 2021-05-28 RX ORDER — PHENAZOPYRIDINE HYDROCHLORIDE 200 MG/1
200 TABLET, FILM COATED ORAL 3 TIMES DAILY PRN
Qty: 10 TABLET | Refills: 0 | Status: SHIPPED | OUTPATIENT
Start: 2021-05-28 | End: 2021-06-01 | Stop reason: ALTCHOICE

## 2021-05-28 RX ORDER — OXYCODONE HYDROCHLORIDE 5 MG/1
5 TABLET ORAL EVERY 4 HOURS PRN
Status: DISCONTINUED | OUTPATIENT
Start: 2021-05-28 | End: 2021-05-28 | Stop reason: HOSPADM

## 2021-05-28 RX ORDER — METOCLOPRAMIDE HYDROCHLORIDE 5 MG/ML
10 INJECTION INTRAMUSCULAR; INTRAVENOUS ONCE AS NEEDED
Status: DISCONTINUED | OUTPATIENT
Start: 2021-05-28 | End: 2021-05-28 | Stop reason: HOSPADM

## 2021-05-28 RX ORDER — SODIUM CHLORIDE, SODIUM LACTATE, POTASSIUM CHLORIDE, CALCIUM CHLORIDE 600; 310; 30; 20 MG/100ML; MG/100ML; MG/100ML; MG/100ML
75 INJECTION, SOLUTION INTRAVENOUS CONTINUOUS
Status: DISCONTINUED | OUTPATIENT
Start: 2021-05-28 | End: 2021-05-28 | Stop reason: HOSPADM

## 2021-05-28 RX ORDER — ALBUTEROL SULFATE 2.5 MG/3ML
2.5 SOLUTION RESPIRATORY (INHALATION) ONCE AS NEEDED
Status: DISCONTINUED | OUTPATIENT
Start: 2021-05-28 | End: 2021-05-28 | Stop reason: HOSPADM

## 2021-05-28 RX ORDER — PROPOFOL 10 MG/ML
INJECTION, EMULSION INTRAVENOUS CONTINUOUS PRN
Status: DISCONTINUED | OUTPATIENT
Start: 2021-05-28 | End: 2021-05-28

## 2021-05-28 RX ORDER — ONDANSETRON 2 MG/ML
4 INJECTION INTRAMUSCULAR; INTRAVENOUS ONCE AS NEEDED
Status: DISCONTINUED | OUTPATIENT
Start: 2021-05-28 | End: 2021-05-28 | Stop reason: HOSPADM

## 2021-05-28 RX ORDER — LABETALOL 20 MG/4 ML (5 MG/ML) INTRAVENOUS SYRINGE
10
Status: DISCONTINUED | OUTPATIENT
Start: 2021-05-28 | End: 2021-05-28 | Stop reason: HOSPADM

## 2021-05-28 RX ORDER — LIDOCAINE HYDROCHLORIDE 10 MG/ML
0.5 INJECTION, SOLUTION EPIDURAL; INFILTRATION; INTRACAUDAL; PERINEURAL ONCE AS NEEDED
Status: DISCONTINUED | OUTPATIENT
Start: 2021-05-28 | End: 2021-05-28 | Stop reason: HOSPADM

## 2021-05-28 RX ORDER — PROPOFOL 10 MG/ML
INJECTION, EMULSION INTRAVENOUS AS NEEDED
Status: DISCONTINUED | OUTPATIENT
Start: 2021-05-28 | End: 2021-05-28

## 2021-05-28 RX ORDER — NAPROXEN 500 MG/1
500 TABLET ORAL 2 TIMES DAILY WITH MEALS
Qty: 10 TABLET | Refills: 0 | Status: SHIPPED | OUTPATIENT
Start: 2021-05-28 | End: 2021-06-01 | Stop reason: ALTCHOICE

## 2021-05-28 RX ORDER — HYDROMORPHONE HCL/PF 1 MG/ML
0.2 SYRINGE (ML) INJECTION
Status: DISCONTINUED | OUTPATIENT
Start: 2021-05-28 | End: 2021-05-28 | Stop reason: HOSPADM

## 2021-05-28 RX ORDER — FUROSEMIDE 10 MG/ML
INJECTION INTRAMUSCULAR; INTRAVENOUS AS NEEDED
Status: DISCONTINUED | OUTPATIENT
Start: 2021-05-28 | End: 2021-05-28

## 2021-05-28 RX ADMIN — PROPOFOL 110 MCG/KG/MIN: 10 INJECTION, EMULSION INTRAVENOUS at 08:51

## 2021-05-28 RX ADMIN — FENTANYL CITRATE 25 MCG: 50 INJECTION, SOLUTION INTRAMUSCULAR; INTRAVENOUS at 08:47

## 2021-05-28 RX ADMIN — FENTANYL CITRATE 25 MCG: 50 INJECTION, SOLUTION INTRAMUSCULAR; INTRAVENOUS at 08:38

## 2021-05-28 RX ADMIN — PROPOFOL 70 MG: 10 INJECTION, EMULSION INTRAVENOUS at 08:32

## 2021-05-28 RX ADMIN — FUROSEMIDE 20 MG: 10 INJECTION, SOLUTION INTRAMUSCULAR; INTRAVENOUS at 08:42

## 2021-05-28 RX ADMIN — PROPOFOL 110 MCG/KG/MIN: 10 INJECTION, EMULSION INTRAVENOUS at 08:32

## 2021-05-28 RX ADMIN — FENTANYL CITRATE 50 MCG: 50 INJECTION, SOLUTION INTRAMUSCULAR; INTRAVENOUS at 08:32

## 2021-05-28 RX ADMIN — SODIUM CHLORIDE, SODIUM LACTATE, POTASSIUM CHLORIDE, AND CALCIUM CHLORIDE: .6; .31; .03; .02 INJECTION, SOLUTION INTRAVENOUS at 08:29

## 2021-05-28 RX ADMIN — CEFAZOLIN SODIUM 2000 MG: 2 SOLUTION INTRAVENOUS at 08:23

## 2021-05-28 NOTE — H&P
UROLOGY HISTORY AND PHYSICAL     Patient Identifiers: Sravanthi Guevara (MRN [id number])      Date of Service: 2021        ASSESSMENT:     78 y o  old male with  medically refracrtory BPH on 3 medications  Presents for urolift  PLAN:     To OR for urolift       History of Present Illness:     Sravanthi Guevara is a 78 y o  old with a history of medically refractory LUTS    Past Medical, Past Surgical History:     Past Medical History:   Diagnosis Date    Irregular heart beat     Afib   :    Past Surgical History:   Procedure Laterality Date    CARDIAC SURGERY      Afib ablation    CATARACT EXTRACTION, BILATERAL Bilateral [id number]   :    Medications, Allergies:     Current Facility-Administered Medications:     ceFAZolin (ANCEF) IVPB (premix in dextrose) 2,000 mg 50 mL, 2,000 mg, Intravenous, Once, Anival Roman MD    lidocaine (PF) (XYLOCAINE-MPF) 1 % injection 0 5 mL, 0 5 mL, Infiltration, Once PRN, Vinicio Márquez MD    Allergies: Allergies   Allergen Reactions    Sulfa Antibiotics Hives   :    Social and Family History:   Social History:   Social History     Tobacco Use    Smoking status: Former Smoker     Types: Cigarettes     Start date: 10/8/1961     Quit date: 10/8/1977     Years since quittin 6    Smokeless tobacco: Never Used   Substance Use Topics    Alcohol use: Not Currently     Frequency: 4 or more times a week     Binge frequency: Never    Drug use: Never     Social History     Tobacco Use   Smoking Status Former Smoker    Types: Cigarettes    Start date: 10/8/1961   Yarely Martinez Quit date: 10/8/1977    Years since quittin 6   Smokeless Tobacco Never Used       Family History:  History reviewed  No pertinent family history :     Review of Systems:     General: Fever, chills, or night sweats: negative  Cardiac: Negative for chest pain  Pulmonary: Negative for shortness of breath    Gastrointestinal: Abdominal pain negative  Nausea, vomiting, or diarrhea negative  Genitourinary: See HPI above  Patient does nothave hematuria  All other systems queried were negative  Physical Exam:   General: Patient is pleasant and in NAD  Awake and alert  BP (!) 178/72   Pulse 58   Temp (!) 97 °F (36 1 °C) (Temporal)   Resp 18   Ht 6' 1" (1 854 m)   Wt 94 8 kg (209 lb)   SpO2 97%   BMI 27 57 kg/m²   HEENT:  Normocephalic atraumatic  Cardiac:  Regular rate and rhythm, Peripheral edema: negative  Pulmonary: Non-labored breathing, CTAB  Abdomen: Soft, non-tender, non-distended  No surgical scars  No masses, tenderness, hernias noted  Genitourinary: negative CVA tenderness, neg suprapubic tenderness  Extremities: normal movement in all 4       Labs:     Lab Results   Component Value Date    HGB 14 9 01/08/2021    HCT 46 6 01/08/2021    WBC 5 98 01/08/2021     01/08/2021   ]    Lab Results   Component Value Date    K 4 3 01/08/2021     (H) 01/08/2021    CO2 30 01/08/2021    BUN 31 (H) 01/08/2021    CREATININE 1 24 01/08/2021    CALCIUM 9 6 01/08/2021   ]    Imaging:   I personally reviewed the images and report of the following studies, and reviewed them with the patient:        Thank you for allowing me to participate in this patients care  Please do not hesitate to call with any additional questions    Saintclair Lah, MD

## 2021-05-28 NOTE — DISCHARGE INSTRUCTIONS
Mr Jasmin Govea II:    Your surgery went very well  I was able to open a nice, wide, more natural channel within your prostate by placing 7 Uro lift implants  Please take your medications as needed for discomfort over the next few days  Most importantly please drink 6-8 glasses water per day  Please remove your catheter at home tomorrow morning at 8:00 a m  Following the directions listed below  If you are unable to urinate within 4-6 hours after removing it please call our office number listed below and will be connected to our on-call team     Please call with any questions or concerns  Sameera Pulido MD,PhD  [facility]  [phone]          UROLIFT      WHAT YOU NEED TO KNOW:   A UroLift is procedure that is done to open the natural channel within your prostate gland  DISCHARGE INSTRUCTIONS:   Medicines:   · Pain medicine: You may be given a prescription medicine to decrease pain  Do not wait until the pain is severe before you take these medicines:  · Tylenol (over the counter) - please take this as directed on the bottle for routine pain  · Naproxen (prescription-strength NSAID/Ibuprofen type medicine) - for moderate pain  This can be substituted with over the counter Ibuprofen if more convenient, or it this is less expensive  · Pyridium - to minimize pain and discomfort when passing your urine  Will turn your urine orange  This can be substituted with over the counter "AZO" if more convenient, or it this is less expensive  · Colace - to prevent constipation    This is also an over the counter medicine - you can purchase it without a prescription, if more convenient or less expensive  · If your pain is not well controlled using Tylenol, Naprosyn/ibuprofen please do not hesitate to call our office, you will be connected to our care team day or night and you can be issued a prescription for a narcotic pain medication    · Antibiotics:  You may be provided with antibiotics at discharge, particularly if you are being sent home with a flores catheter     This medicine is given to fight or prevent an infection caused by bacteria  Always take your antibiotics exactly as ordered by your healthcare provider  Do not stop taking your medicine unless directed by your healthcare provider  Never save antibiotics or take leftover antibiotics that were given to you for another illness  · Take your medicine as directed  Contact your healthcare provider if you think your medicine is not helping or if you have side effects  Tell him or her if you are allergic to any medicine  Keep a list of the medicines, vitamins, and herbs you take  Include the amounts, and when and why you take them  Bring the list or the pill bottles to follow-up visits  Carry your medicine list with you in case of an emergency  Follow up with your healthcare provider or urologist as directed: You may need to return to make sure you do not have an infection, or to have your Flores catheter removed  Write down your questions so you remember to ask them during your visits  Flores catheter care: You may be sent home with a Flores catheter  If so you will be provided with instructions to remove it    · Please drink plenty of fluids  Blood in your urine is normal for few days following the procedure  Keeping well-hydrated will prevent any trouble from this    Contact your healthcare provider or urologist if:   · You have a fever  · You have new or more blood in your urine  · You have trouble starting to urinate, or have a weak stream of urine when you urinate  · You feel like you have a full bladder, even after you urinate  You may also leak urine  · You often wake up during the night to urinate  You may also feel the need to urinate right away  · You feel pain and burning when you urinate  · You feel pain or pressure in your lower abdomen  · Your urine looks cloudy, and smells bad  · You have trouble getting an erection or ejaculating  · You have questions or concerns about your condition or care  Seek care immediately or call 911 if:   · You urinate little or not at all  · You have severe abdominal or back pain  · You are dizzy or confused  · You have abdominal pain, nausea, and vomiting  · Your heartbeat is slower than usual   © 2017 [facility] Information is for End User's use only and may not be sold, redistributed or otherwise used for commercial purposes  All illustrations and images included in CareNotes® are the copyrighted property of A D A M , Inc  or Tor[employer] Kay  The above information is an  only  It is not intended as medical advice for individual conditions or treatments  Talk to your doctor, nurse or pharmacist before following any medical regimen to see if it is safe and effective for you  Flores Catheter Removal after [address] [place] [address] Urology [phone]    A Flores catheter is a sterile tube that is inserted into your bladder to drain urine  It is also called an indwelling urinary catheter  The tip of the catheter has a small balloon filled with solution that holds the catheter inside your bladder  This can be removed at home if you prefer; we provide necessary supplies and ensure proper teaching  Or this can be removed in the office by a nurse or healthcare worker, whichever you prefer  Antibiotics may be given to be taken before and after catheter removal to prevent infection  Directions for Flores Catheter removal at home:   Empty any urine out of drainage bag  [facility] your hands with soap and water  You may then wear gloves for the procedure if preferred   Put the syringe into the balloon port of the flores catheter   (This is the part not attached to the drainage bag and generally is smaller with a colored portion around it ) Push and twist to make sure the syringe is in proper position  Pull back on plunger to draw water out of the balloon  Detach syringe, empty water into cup or emesis basin  Repeat process of using syringe to empty water from balloon a few more times to ensure balloon is completely empty   You may want to stand or sit in shower/bathtub to remove catheter as urine may drip out when you remove it  Slowly but steadily pull catheter out  Catheter may need to be turned in a [place] if it at first feels 'stuck ' If catheter does not come out when you pull gently, stop pulling and call the urology office  After flores catheter removal:   You may be asked to drink plenty of water to ensure hydration and to flush out lower urinary tract   Sometimes we have a second visit in the afternoon scheduled to ensure you are able to empty your bladder appropriately  This will be scheduled with flores catheter removal visit if necessary   Normal symptoms after flores catheter removal include urinary urgency, urinary frequency, burning with urination and some blood in urine  These can be normal for 24-48 hours after removal  If these symptoms persist longer than two days, please call the urology office   If you are unable to urinate 8 hours after removal and are uncomfortable or if you develop a fever please call urology office   Follow up as directed by urology office  This follow up will usually be scheduled prior to the surgery or during nurse post operative phone call

## 2021-05-28 NOTE — ANESTHESIA POSTPROCEDURE EVALUATION
Post-Op Assessment Note    CV Status:  Stable  Pain Score: 0    Pain management: adequate     Mental Status:  Sleepy   Hydration Status:  Euvolemic   PONV Controlled:  Controlled   Airway Patency:  Patent      Post Op Vitals Reviewed: Yes      Staff: Anesthesiologist, CRNA         No complications documented      BP  119/65   Temp  96 5   Pulse  43   Resp   10   SpO2   99

## 2021-05-28 NOTE — ANESTHESIA PREPROCEDURE EVALUATION
Procedure:  CYSTOSCOPY WITH INSERTION UROLIFT (N/A Urethra)    Relevant Problems   ANESTHESIA (within normal limits)      CARDIO   (+) Bigeminy   (+) Coronary artery disease due to calcified coronary lesion (s/p stents x 3  Last in 2013)   (+) PVC (premature ventricular contraction)   (+) Paroxysmal atrial fibrillation (HCC) (s/p ablation in 2014)   (+) Pure hypercholesterolemia   (+) Sinus bradycardia      /RENAL   (+) Benign prostatic hyperplasia with lower urinary tract symptoms   (+) Stage 3a chronic kidney disease (HCC)      PULMONARY   (+) LIOR (obstructive sleep apnea) (Non compliant with CPAP)      Other   (+) PLMD (periodic limb movement disorder)   (+) Status post radiofrequency ablation (RFA) operation for arrhythmia   2/10/2021  LEFT VENTRICLE: Size was normal  Systolic function was normal  Ejection fraction was estimated to be 65 %  There were no regional wall motion abnormalities  Wall thickness was normal  DOPPLER: Left ventricular diastolic function parameters  were normal      RIGHT VENTRICLE: The size was normal  Systolic function was normal  Wall thickness was normal      LEFT ATRIUM: Size was normal      RIGHT ATRIUM: Size was normal      MITRAL VALVE: Valve structure was normal  There was normal leaflet separation  DOPPLER: The transmitral velocity was within the normal range  There was no evidence for stenosis  There was no regurgitation      AORTIC VALVE: The valve was trileaflet  Leaflets exhibited normal thickness and normal cuspal separation  DOPPLER: Transaortic velocity was within the normal range  There was no evidence for stenosis  There was mild regurgitation      TRICUSPID VALVE: The valve structure was normal  There was normal leaflet separation  DOPPLER: The transtricuspid velocity was within the normal range  There was no evidence for stenosis  There was trace regurgitation   Estimated peak PA  pressure was 33 mmHg      PULMONIC VALVE: Leaflets exhibited normal thickness, no calcification, and normal cuspal separation  DOPPLER: The transpulmonic velocity was within the normal range  There was trace regurgitation      PERICARDIUM: There was no pericardial effusion  The pericardium was normal in appearance      AORTA: The root exhibited normal size  Physical Exam    Airway    Mallampati score: II  TM Distance: >3 FB  Neck ROM: full     Dental   No notable dental hx     Cardiovascular  Rhythm: regular, Rate: normal, Cardiovascular exam normal    Pulmonary  Pulmonary exam normal Breath sounds clear to auscultation,     Other Findings        Anesthesia Plan  ASA Score- 2     Anesthesia Type- IV sedation with anesthesia with ASA Monitors  Additional Monitors:   Airway Plan:           Plan Factors-Exercise tolerance (METS): >4 METS  Chart reviewed  EKG reviewed  Existing labs reviewed  Patient summary reviewed  Patient is not a current smoker  Induction-     Postoperative Plan-     Informed Consent- Anesthetic plan and risks discussed with patient  I personally reviewed this patient with the CRNA  Discussed and agreed on the Anesthesia Plan with the CRNA  Janel Dewey

## 2021-05-28 NOTE — OP NOTE
Operative Note     PATIENT:  Jessenia Cortés (MRN [id number])    DATE OF PROCEDURE:   5/28/2021    PRE-OP DIAGNOSES:   1) BPH with obstruction     POST-OP DIAGNOSES AND OPERATIVE FINDINGS:   1) BPH with obstruction    PROCEDURES:  1) UroLift implantation    SURGEON:   Emely Avalos MD    No qualified teaching residents available to assist    ANESTHESIA TYPE:  General anesthesia    ESTIMATED BLOOD LOSS:   Minimal    COMPLICATIONS:   None    ANTIBIOTICS:  Cefazolin     INTRAOPERATIVE THROMBOEMBOLISM PROPHYLAXIS:  Pneumatic compression stockings    PROCEDURE SUMMARY:    The patient was identified, brought to the operating room, and placed on the table in supine position  After induction of general anesthesia, the patient was placed in dorsal lithotomy position and prepped and draped in the usual sterile fashion  A complete formal timeout was performed  A 20F cystoscope was inserted into the bladder  The cystoscopy bridge was replaced with a UroLift delivery device  The first treatment site was the patient's left side approximately 1 5 cm distal to the bladder neck  The distal tip of the delivery device was then angled laterally approximately 20 degrees at this position to compress the lateral lobe  The trigger was pulled, thereby deploying a needle containing the implant through the prostate  The needle was then retracted, allowing one end of the implant to be delivered to the capsular surface of the prostate  The implant was then tensioned to assure capsular seating and removal of slack monofilament  The device was then angled back toward midline and slowly advanced proximally until cystoscopic verification of the monofilament being centered in the delivery bay  The urethral end piece was then affixed to the monofilament thereby tailoring the size of the implant  Excess filament was then severed  The delivery device was then re-advanced into the bladder   The delivery device was then replaced with cystoscope and bridge and the implant location and opening effect was confirmed cystoscopically  The same procedure was then repeated on the right side, and two additional implants were delivered just proximal to the veru montanum, again one on left and one on right side of the prostate, following the same technique  A total of 7 implants were deployed to create a nice anterior channel  Implants were deployed in the following locations:    1  Right bladder neck  2  Left bladder neck  3  Right apex  4  Left apex  5 ,6   Right mid gland x 2  7  Left mid gland    A final cystoscopy was conducted first to inspect the location and state of each implant and second, to confirm the presence of a continuous anterior channel was present through the prostatic urethra with irrigation flow turned off  A flores catheter was then placed  The patient tolerated the procedure well and was transferred to the recovery room awake alert and in stable condition  IMPLANTS:   Implant Name Type Inv  Item Serial No   Lot No  LRB No  Used Action   IMPLANT URO PROSTATE UROLIFT - [id number]  IMPLANT URO PROSTATE UROLIFT  [employer] [id number] N/A 4 Implanted   IMPLANT URO PROSTATE UROLIFT - [id number]  IMPLANT URO PROSTATE UROLIFT  [employer] [id number] N/A 1 Implanted   urolift     [id number] N/A 1 Implanted        PLAN:  Patient will be discharged home with Flores catheter 24 hours  Medications changes: Will immediately stop tamsulosin and finasteride    Will continue the trospium until the patient is scheduled follow-up visit

## 2021-06-01 ENCOUNTER — OFFICE VISIT (OUTPATIENT)
Dept: GERIATRICS | Facility: CLINIC | Age: 81
End: 2021-06-01
Payer: MEDICARE

## 2021-06-01 VITALS
WEIGHT: 213.6 LBS | DIASTOLIC BLOOD PRESSURE: 58 MMHG | OXYGEN SATURATION: 96 % | SYSTOLIC BLOOD PRESSURE: 140 MMHG | BODY MASS INDEX: 28.18 KG/M2 | HEART RATE: 58 BPM | TEMPERATURE: 97.2 F

## 2021-06-01 DIAGNOSIS — R00.1 SINUS BRADYCARDIA: ICD-10-CM

## 2021-06-01 DIAGNOSIS — N40.1 BENIGN PROSTATIC HYPERPLASIA WITH LOWER URINARY TRACT SYMPTOMS, SYMPTOM DETAILS UNSPECIFIED: Primary | ICD-10-CM

## 2021-06-01 PROCEDURE — 1124F ACP DISCUSS-NO DSCNMKR DOCD: CPT | Performed by: NURSE PRACTITIONER

## 2021-06-01 PROCEDURE — 99212 OFFICE O/P EST SF 10 MIN: CPT | Performed by: NURSE PRACTITIONER

## 2021-06-01 NOTE — ASSESSMENT & PLAN NOTE
· Norm for patient - runs 40-50 s/p ablation  · No reports of ha/dizziness/cp  · No HR lowering medications  · Follows with cards  No concerns at present

## 2021-06-01 NOTE — PROGRESS NOTES
Assessment/Plan:    Benign prostatic hyperplasia with lower urinary tract symptoms  · S/p urolift  · Procedure went well  No complaints  · F/u with uro as directed    Sinus bradycardia  · Norm for patient - runs 40-50 s/p ablation  · No reports of ha/dizziness/cp  · No HR lowering medications  · Follows with cards  No concerns at present  There are no diagnoses linked to this encounter  Subjective:      Patient ID: Sravani Degree II is a 78 y o  male  Patient seen for follow up of visit s/p urolift  Same day procedure  Had twilight sedation  VSS for patient  Afib, htn, bradycardis  Urination back to normal, urine clear  Pain meds completed  Had some initial soreness, none further  Uro is monitoring  Catheter removed by pt per instructions on Saturday  Takes stool softener for constipation- medication working  No leg swelling  Function back to normal   Resumed walking yesterday  Appetite back to norm      The following portions of the patient's history were reviewed and updated as appropriate: past family history, past medical history, past social history and past surgical history  Review of Systems   Constitutional: Negative for activity change, appetite change, chills and fatigue  HENT: Negative for congestion  Respiratory: Negative for cough and shortness of breath  Cardiovascular: Negative for chest pain  Gastrointestinal: Negative for abdominal pain, constipation, diarrhea, nausea and vomiting  Genitourinary: Negative for difficulty urinating  Musculoskeletal: Negative for arthralgias, back pain and gait problem  Neurological: Negative for dizziness and light-headedness  Psychiatric/Behavioral: Negative for decreased concentration           Objective:      /58 (BP Location: Right arm, Patient Position: Sitting, Cuff Size: Adult)   Pulse 58   Temp (!) 97 2 °F (36 2 °C) (Temporal)   Wt 96 9 kg (213 lb 9 6 oz)   SpO2 96%   BMI 28 18 kg/m² Physical Exam  Vitals signs and nursing note reviewed  Constitutional:       General: He is not in acute distress  Appearance: Normal appearance  He is well-developed  He is not diaphoretic  HENT:      Head: Normocephalic  Neck:      Musculoskeletal: Normal range of motion  Cardiovascular:      Rate and Rhythm: Bradycardia present  Heart sounds: No murmur  No friction rub  No gallop  Pulmonary:      Effort: Pulmonary effort is normal  No respiratory distress  Breath sounds: Normal breath sounds  No wheezing or rales  Abdominal:      General: Bowel sounds are normal  There is no distension  Palpations: Abdomen is soft  Tenderness: There is no abdominal tenderness  Musculoskeletal: Normal range of motion  Skin:     General: Skin is warm and dry  Neurological:      General: No focal deficit present  Mental Status: He is alert and oriented to person, place, and time  Mental status is at baseline     Psychiatric:         Mood and Affect: Mood normal          Behavior: Behavior normal

## 2021-06-17 DIAGNOSIS — N32.81 OAB (OVERACTIVE BLADDER): ICD-10-CM

## 2021-06-17 RX ORDER — TROSPIUM CHLORIDE 20 MG/1
20 TABLET, FILM COATED ORAL 2 TIMES DAILY
Qty: 60 TABLET | Refills: 0 | OUTPATIENT
Start: 2021-06-17

## 2021-06-17 NOTE — TELEPHONE ENCOUNTER
Patient left a message on the Medication Refill voice mail line requesting a new prescription for Trospium Chloride IR 20mg  He has a post-op visit scheduled for 7/1/21 but will run out of medication until then  He had a Urolift procedure back in May and he's not sure if he should refill it

## 2021-07-01 ENCOUNTER — OFFICE VISIT (OUTPATIENT)
Dept: UROLOGY | Facility: CLINIC | Age: 81
End: 2021-07-01
Payer: MEDICARE

## 2021-07-01 VITALS
BODY MASS INDEX: 27.7 KG/M2 | HEART RATE: 56 BPM | HEIGHT: 73 IN | SYSTOLIC BLOOD PRESSURE: 122 MMHG | WEIGHT: 209 LBS | DIASTOLIC BLOOD PRESSURE: 56 MMHG

## 2021-07-01 DIAGNOSIS — R35.0 BENIGN PROSTATIC HYPERPLASIA WITH URINARY FREQUENCY: Primary | ICD-10-CM

## 2021-07-01 DIAGNOSIS — N40.1 BENIGN PROSTATIC HYPERPLASIA WITH URINARY FREQUENCY: Primary | ICD-10-CM

## 2021-07-01 PROCEDURE — 99213 OFFICE O/P EST LOW 20 MIN: CPT | Performed by: PHYSICIAN ASSISTANT

## 2021-07-01 NOTE — PROGRESS NOTES
UROLOGY PROGRESS NOTE   Patient Identifiers: Rosalie Treadwell (MRN [id number])  Date of Service: 7/1/2021    Subjective:    41-year-old man with obstructing BPH  He had a Urolift procedure 5/28  He just finished taking trospium  He complains of some frequency  PVR 18ml  He has an occasional urge leak        Reason for visit:  BPH/Urolift follow-up      Objective:     VITALS:    Vitals:    07/01/21 1146   BP: 122/56   Pulse: 56           LABS:  Lab Results   Component Value Date    HGB 14 9 01/08/2021    HCT 46 6 01/08/2021    WBC 5 98 01/08/2021     01/08/2021   ]    Lab Results   Component Value Date    K 4 3 01/08/2021     (H) 01/08/2021    CO2 30 01/08/2021    BUN 31 (H) 01/08/2021    CREATININE 1 24 01/08/2021    CALCIUM 9 6 01/08/2021   ]        INPATIENT MEDS:    Current Outpatient Medications:     aspirin (ECOTRIN LOW STRENGTH) 81 mg EC tablet, Take 81 mg by mouth daily, Disp: , Rfl:     atorvastatin (LIPITOR) 20 mg tablet, Take 20 mg by mouth daily, Disp: , Rfl:     cholecalciferol (VITAMIN D3) 1,000 units tablet, Take 1,000 Units by mouth daily, Disp: , Rfl:     Coenzyme Q10 (CoQ10) 100 MG CAPS, Take by mouth daily , Disp: , Rfl:     cyanocobalamin (VITAMIN B-12) 100 mcg tablet, Take by mouth daily, Disp: , Rfl:     Glucosamine-Chondroit-Vit C-Mn (GLUCOSAMINE CHONDR 1500 COMPLX PO), Take by mouth daily , Disp: , Rfl:     metroNIDAZOLE (METROGEL) 0 75 % gel, , Disp: , Rfl:     Multiple Vitamin (multivitamin) capsule, Take 1 capsule by mouth daily, Disp: , Rfl:     Omega 3 1000 MG CAPS, Take by mouth daily , Disp: , Rfl:     docusate sodium (COLACE) 100 mg capsule, Take 1 capsule (100 mg total) by mouth 2 (two) times a day for 15 days, Disp: 30 capsule, Rfl: 0    trospium chloride (SANCTURA) 20 mg tablet, Take 1 tablet (20 mg total) by mouth 2 (two) times a day, Disp: 60 tablet, Rfl: 3      Physical Exam:   /56 (BP Location: Left arm, Patient Position: Sitting, Cuff Size: Adult) Pulse 56   Ht 6' 1" (1 854 m)   Wt 94 8 kg (209 lb)   BMI 27 57 kg/m²   GEN: no acute distress    RESP: breathing comfortably with no accessory muscle use    ABD: soft, non-tender, non-distended   INCISION:    EXT: no significant peripheral edema     RADIOLOGY:    none     Assessment:   1    BPH/ status post Urolift    Plan:   - he is no longer on any medication  - follow-up in 6 months  - should call me with any questions or concerns  -

## 2021-08-04 DIAGNOSIS — E78.00 PURE HYPERCHOLESTEROLEMIA: Primary | ICD-10-CM

## 2021-08-04 RX ORDER — ATORVASTATIN CALCIUM 20 MG/1
20 TABLET, FILM COATED ORAL DAILY
Qty: 90 TABLET | Refills: 2 | Status: SHIPPED | OUTPATIENT
Start: 2021-08-04 | End: 2022-06-17

## 2021-08-24 PROBLEM — L30.4 INTERTRIGO: Status: ACTIVE | Noted: 2021-08-24

## 2021-09-16 ENCOUNTER — OFFICE VISIT (OUTPATIENT)
Dept: GERIATRICS | Facility: CLINIC | Age: 81
End: 2021-09-16
Payer: MEDICARE

## 2021-09-16 VITALS
TEMPERATURE: 97 F | SYSTOLIC BLOOD PRESSURE: 110 MMHG | HEART RATE: 50 BPM | DIASTOLIC BLOOD PRESSURE: 60 MMHG | OXYGEN SATURATION: 99 % | WEIGHT: 208.8 LBS | BODY MASS INDEX: 27.55 KG/M2

## 2021-09-16 DIAGNOSIS — L30.4 INTERTRIGO: Primary | ICD-10-CM

## 2021-09-16 PROCEDURE — 99212 OFFICE O/P EST SF 10 MIN: CPT | Performed by: NURSE PRACTITIONER

## 2021-09-16 RX ORDER — CLOTRIMAZOLE AND BETAMETHASONE DIPROPIONATE 10; .64 MG/G; MG/G
CREAM TOPICAL 2 TIMES DAILY
Qty: 30 G | Refills: 0 | Status: SHIPPED | OUTPATIENT
Start: 2021-09-16 | End: 2021-09-17 | Stop reason: SDUPTHER

## 2021-09-16 NOTE — ASSESSMENT & PLAN NOTE
· Did not respond to nystatin  · Area cont reddened, slightly larger  · Also reporting he now has "jock itch" - also not responding to nystatin  · Will rx lotrisone (pt would like to try alternative topical medication)  · Pt will call office next week to let us know if any improvement or not

## 2021-09-16 NOTE — PROGRESS NOTES
Assessment/Plan:  Intertrigo  · Did not respond to nystatin  · Area cont reddened, slightly larger  · Also reporting he now has "jock itch" - also not responding to nystatin  · Will rx lotrisone (pt would like to try alternative topical medication)  · Pt will call office next week to let us know if any improvement or not       There are no diagnoses linked to this encounter  Subjective:      Patient ID: Carmen Nolan II is a 78 y o  male  HPI  Pmh, psh, med list, allergies, pb list    Review of Systems   Constitutional: Negative for activity change, appetite change, fatigue and fever  Skin: Positive for rash (no itching or pain)  Objective:      /60 (BP Location: Left arm, Patient Position: Sitting, Cuff Size: Standard)   Pulse (!) 50   Temp (!) 97 °F (36 1 °C) (Temporal)   Wt 94 7 kg (208 lb 12 8 oz)   SpO2 99%   BMI 27 55 kg/m²          Physical Exam  Vitals and nursing note reviewed  Constitutional:       Appearance: Normal appearance  Skin:     General: Skin is warm and dry  Comments: Fungal patch remains under left arm - now slightly larger  Does not itch or hurt per pt  Declined groin rash visualization, states looks same as under arm rash   Neurological:      Mental Status: He is alert

## 2021-09-17 DIAGNOSIS — L30.4 INTERTRIGO: ICD-10-CM

## 2021-09-17 RX ORDER — CLOTRIMAZOLE AND BETAMETHASONE DIPROPIONATE 10; .64 MG/G; MG/G
CREAM TOPICAL 2 TIMES DAILY
Qty: 30 G | Refills: 0 | Status: SHIPPED | OUTPATIENT
Start: 2021-09-17 | End: 2022-07-19

## 2021-10-06 ENCOUNTER — OFFICE VISIT (OUTPATIENT)
Dept: GERIATRICS | Facility: CLINIC | Age: 81
End: 2021-10-06
Payer: MEDICARE

## 2021-10-06 VITALS
OXYGEN SATURATION: 98 % | SYSTOLIC BLOOD PRESSURE: 138 MMHG | HEIGHT: 73 IN | WEIGHT: 210.9 LBS | DIASTOLIC BLOOD PRESSURE: 66 MMHG | BODY MASS INDEX: 27.95 KG/M2 | TEMPERATURE: 98.6 F | HEART RATE: 51 BPM

## 2021-10-06 DIAGNOSIS — R03.0 ELEVATED BP WITHOUT DIAGNOSIS OF HYPERTENSION: ICD-10-CM

## 2021-10-06 DIAGNOSIS — N39.41 URGE INCONTINENCE: ICD-10-CM

## 2021-10-06 DIAGNOSIS — R89.9 ABNORMAL LABORATORY TEST: ICD-10-CM

## 2021-10-06 DIAGNOSIS — R00.1 SINUS BRADYCARDIA: ICD-10-CM

## 2021-10-06 DIAGNOSIS — L98.9 SCALP LESION: ICD-10-CM

## 2021-10-06 DIAGNOSIS — L30.4 INTERTRIGO: ICD-10-CM

## 2021-10-06 DIAGNOSIS — I48.0 PAROXYSMAL ATRIAL FIBRILLATION (HCC): ICD-10-CM

## 2021-10-06 DIAGNOSIS — G47.33 OSA (OBSTRUCTIVE SLEEP APNEA): ICD-10-CM

## 2021-10-06 DIAGNOSIS — E78.00 PURE HYPERCHOLESTEROLEMIA: ICD-10-CM

## 2021-10-06 DIAGNOSIS — E55.9 VITAMIN D DEFICIENCY: ICD-10-CM

## 2021-10-06 DIAGNOSIS — N40.1 BENIGN PROSTATIC HYPERPLASIA WITH LOWER URINARY TRACT SYMPTOMS, SYMPTOM DETAILS UNSPECIFIED: ICD-10-CM

## 2021-10-06 DIAGNOSIS — N18.31 STAGE 3A CHRONIC KIDNEY DISEASE (HCC): Primary | ICD-10-CM

## 2021-10-06 DIAGNOSIS — B35.1 TOENAIL FUNGUS: ICD-10-CM

## 2021-10-06 DIAGNOSIS — R79.9 ABNORMAL FINDING OF BLOOD CHEMISTRY, UNSPECIFIED: ICD-10-CM

## 2021-10-06 PROCEDURE — 99215 OFFICE O/P EST HI 40 MIN: CPT | Performed by: STUDENT IN AN ORGANIZED HEALTH CARE EDUCATION/TRAINING PROGRAM

## 2021-10-07 PROBLEM — B35.1 TOENAIL FUNGUS: Status: ACTIVE | Noted: 2021-10-07

## 2021-10-07 PROBLEM — R03.0 ELEVATED BP WITHOUT DIAGNOSIS OF HYPERTENSION: Status: ACTIVE | Noted: 2021-10-07

## 2021-10-07 PROBLEM — L98.9 SCALP LESION: Status: ACTIVE | Noted: 2021-10-07

## 2021-10-12 ENCOUNTER — TELEPHONE (OUTPATIENT)
Dept: GERIATRICS | Facility: CLINIC | Age: 81
End: 2021-10-12

## 2021-10-13 DIAGNOSIS — R21 RASH: Primary | ICD-10-CM

## 2021-10-14 ENCOUNTER — TELEPHONE (OUTPATIENT)
Dept: GERIATRICS | Facility: CLINIC | Age: 81
End: 2021-10-14

## 2021-10-15 ENCOUNTER — CONSULT (OUTPATIENT)
Dept: DERMATOLOGY | Facility: CLINIC | Age: 81
End: 2021-10-15
Payer: MEDICARE

## 2021-10-15 VITALS — TEMPERATURE: 98.6 F | BODY MASS INDEX: 27.43 KG/M2 | HEIGHT: 73 IN | WEIGHT: 207 LBS

## 2021-10-15 DIAGNOSIS — Z12.83 SKIN CANCER SCREENING: ICD-10-CM

## 2021-10-15 PROCEDURE — 99203 OFFICE O/P NEW LOW 30 MIN: CPT | Performed by: DERMATOLOGY

## 2021-12-13 ENCOUNTER — TELEPHONE (OUTPATIENT)
Dept: GERIATRICS | Facility: CLINIC | Age: 81
End: 2021-12-13

## 2021-12-13 DIAGNOSIS — Z20.822 CLOSE EXPOSURE TO COVID-19 VIRUS: Primary | ICD-10-CM

## 2021-12-15 ENCOUNTER — TELEPHONE (OUTPATIENT)
Dept: GERIATRICS | Facility: CLINIC | Age: 81
End: 2021-12-15

## 2022-01-05 ENCOUNTER — OFFICE VISIT (OUTPATIENT)
Dept: UROLOGY | Facility: CLINIC | Age: 82
End: 2022-01-05
Payer: MEDICARE

## 2022-01-05 VITALS
SYSTOLIC BLOOD PRESSURE: 130 MMHG | WEIGHT: 213 LBS | HEART RATE: 60 BPM | BODY MASS INDEX: 28.23 KG/M2 | HEIGHT: 73 IN | DIASTOLIC BLOOD PRESSURE: 72 MMHG

## 2022-01-05 DIAGNOSIS — R35.0 BENIGN PROSTATIC HYPERPLASIA WITH URINARY FREQUENCY: Primary | ICD-10-CM

## 2022-01-05 DIAGNOSIS — N40.1 BENIGN PROSTATIC HYPERPLASIA WITH URINARY FREQUENCY: Primary | ICD-10-CM

## 2022-01-05 DIAGNOSIS — N39.41 URGE INCONTINENCE: ICD-10-CM

## 2022-01-05 LAB
POST-VOID RESIDUAL VOLUME, ML POC: 17 ML
SL AMB  POCT GLUCOSE, UA: NORMAL
SL AMB LEUKOCYTE ESTERASE,UA: NORMAL
SL AMB POCT BILIRUBIN,UA: NORMAL
SL AMB POCT BLOOD,UA: NORMAL
SL AMB POCT CLARITY,UA: CLEAR
SL AMB POCT COLOR,UA: NORMAL
SL AMB POCT KETONES,UA: NORMAL
SL AMB POCT NITRITE,UA: NORMAL
SL AMB POCT PH,UA: 5
SL AMB POCT SPECIFIC GRAVITY,UA: 1.02
SL AMB POCT URINE PROTEIN: NORMAL
SL AMB POCT UROBILINOGEN: 0.2

## 2022-01-05 PROCEDURE — 81002 URINALYSIS NONAUTO W/O SCOPE: CPT | Performed by: PHYSICIAN ASSISTANT

## 2022-01-05 PROCEDURE — 51798 US URINE CAPACITY MEASURE: CPT | Performed by: PHYSICIAN ASSISTANT

## 2022-01-05 PROCEDURE — 99213 OFFICE O/P EST LOW 20 MIN: CPT | Performed by: PHYSICIAN ASSISTANT

## 2022-01-05 NOTE — PROGRESS NOTES
UROLOGY PROGRESS NOTE   Patient Identifiers: Indy Davalos (MRN [id number])  Date of Service: 1/5/2022    Subjective:     26-year-old man history of obstructing BPH  He had a UroLift procedure in May  He has some frequency  He had an urge leak  Uses 1 pad per day  He had severe dry mouth from anticholinergic  Reason for visit:  BPH follow-up      Objective:     VITALS:    There were no vitals filed for this visit          LABS:  Lab Results   Component Value Date    HGB 14 9 01/08/2021    HCT 46 6 01/08/2021    WBC 5 98 01/08/2021     01/08/2021   ]    Lab Results   Component Value Date    K 4 3 01/08/2021     (H) 01/08/2021    CO2 30 01/08/2021    BUN 31 (H) 01/08/2021    CREATININE 1 24 01/08/2021    CALCIUM 9 6 01/08/2021   ]        INPATIENT MEDS:    Current Outpatient Medications:     aspirin (ECOTRIN LOW STRENGTH) 81 mg EC tablet, Take 81 mg by mouth daily, Disp: , Rfl:     atorvastatin (LIPITOR) 20 mg tablet, Take 1 tablet (20 mg total) by mouth daily, Disp: 90 tablet, Rfl: 2    cholecalciferol (VITAMIN D3) 1,000 units tablet, Take 1,000 Units by mouth daily, Disp: , Rfl:     clotrimazole-betamethasone (LOTRISONE) 1-0 05 % cream, Apply topically 2 (two) times a day (Patient not taking: Reported on 10/6/2021), Disp: 30 g, Rfl: 0    Coenzyme Q10 (CoQ10) 100 MG CAPS, Take by mouth daily , Disp: , Rfl:     cyanocobalamin (VITAMIN B-12) 100 mcg tablet, Take by mouth daily, Disp: , Rfl:     docusate sodium (COLACE) 100 mg capsule, Take 1 capsule (100 mg total) by mouth 2 (two) times a day for 15 days, Disp: 30 capsule, Rfl: 0    Glucosamine-Chondroit-Vit C-Mn (GLUCOSAMINE CHONDR 1500 COMPLX PO), Take by mouth daily , Disp: , Rfl:     metroNIDAZOLE (METROGEL) 0 75 % gel, , Disp: , Rfl:     Multiple Vitamin (multivitamin) capsule, Take 1 capsule by mouth daily, Disp: , Rfl:     Omega 3 1000 MG CAPS, Take by mouth daily , Disp: , Rfl:     trospium chloride (SANCTURA) 20 mg tablet, Take 1 tablet (20 mg total) by mouth 2 (two) times a day, Disp: 60 tablet, Rfl: 3      Physical Exam:   There were no vitals taken for this visit  GEN: no acute distress    RESP: breathing comfortably with no accessory muscle use    ABD: soft, non-tender, non-distended   INCISION:    EXT: no significant peripheral edema         RADIOLOGY:    none     Assessment:     1   BPH status post UroLift     Plan:   - will try Myrbetriq 25 mg at HS if it is covered  - we discussed the objectives of his management and if he cannot take Myrbetriq no further treatment would be recommended  - follow-up in 6 months for his annual exam  -

## 2022-01-18 ENCOUNTER — TELEPHONE (OUTPATIENT)
Dept: GERIATRICS | Age: 82
End: 2022-01-18

## 2022-01-18 NOTE — TELEPHONE ENCOUNTER
Wanda Gardner of [facility] ([phone]) called to say the patient fell on the ice this morning  Patient was given a stability test by therapy and was deemed okay  Patient complains of stiffness and achiness in his back and ribs  He is taking tylenol for pain relief  Wanda Gardner reports patient's blood pressure of 155-73 with pulse of 53

## 2022-01-19 ENCOUNTER — APPOINTMENT (OUTPATIENT)
Dept: RADIOLOGY | Facility: CLINIC | Age: 82
End: 2022-01-19
Payer: MEDICARE

## 2022-01-19 ENCOUNTER — OFFICE VISIT (OUTPATIENT)
Dept: GERIATRICS | Age: 82
End: 2022-01-19
Payer: MEDICARE

## 2022-01-19 VITALS
BODY MASS INDEX: 26.77 KG/M2 | DIASTOLIC BLOOD PRESSURE: 72 MMHG | WEIGHT: 208.6 LBS | OXYGEN SATURATION: 99 % | HEART RATE: 56 BPM | TEMPERATURE: 97.3 F | HEIGHT: 74 IN | RESPIRATION RATE: 20 BRPM | SYSTOLIC BLOOD PRESSURE: 134 MMHG

## 2022-01-19 DIAGNOSIS — M25.512 ACUTE PAIN OF LEFT SHOULDER: ICD-10-CM

## 2022-01-19 DIAGNOSIS — R07.81 RIB PAIN ON LEFT SIDE: ICD-10-CM
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
## 2022-01-19 DIAGNOSIS — M54.6 ACUTE MIDLINE THORACIC BACK PAIN: ICD-10-CM

## 2022-01-19 PROCEDURE — 72072 X-RAY EXAM THORAC SPINE 3VWS: CPT

## 2022-01-19 PROCEDURE — 73030 X-RAY EXAM OF SHOULDER: CPT

## 2022-01-19 PROCEDURE — 71101 X-RAY EXAM UNILAT RIBS/CHEST: CPT

## 2022-01-19 PROCEDURE — 99213 OFFICE O/P EST LOW 20 MIN: CPT | Performed by: NURSE PRACTITIONER

## 2022-01-19 NOTE — PATIENT INSTRUCTIONS
1   Recommend Tylenol 500 mg tablet, take 2 tablets every 6 to 8 hours until pain subsides  2  Recommend Lidocaine 4% patch, wear for 12 hours and off for 12 hours  Recommend Salon pas or Aspercreme brand  3  Follow-up in 2 weeks

## 2022-01-19 NOTE — PROGRESS NOTES
ASSESSMENT AND PLAN:  1  Fall, initial encounter  Assessment & Plan:  · Status post unwitnessed mechanical fall on the ice on 01/18/2022  · Patient presents with increased pain in his thoracic spine, left shoulder and left rib cage  · Will order x-rays as below  · Recommend taking extra strength Tylenol three times daily and use a lidocaine patch to the affected area on for 12 hours and off for 12 hours  · May alternate between heat and ice for 20 minute intervals as tolerated  · Encouraged cough and deep breathing exercises due to rib pain  · Follow-up in 2 weeks      2  Acute midline thoracic back pain  -     XR spine thoracic 3 vw; Future; Expected date: 01/19/2022    3  Rib pain on left side  -     XR ribs left w pa chest min 3 views; Future; Expected date: 01/19/2022    4  Acute pain of left shoulder  -     XR shoulder 2+ vw left; Future; Expected date: 01/19/2022      HPI:    Shayne Ashley is a 78year old male patient seen and examined today for complaint of stiffness and achiness of of his back and ribs  He is reported to have fallen on the ice on 01/18/2022  He lives in residential living at [facility] and went to the wellness center there after the fall  He was given a stability test by therapy and was deemed okay  He is taking Tylenol with relief  His blood pressure was 155/73, HR 53 after the fall  Monia Le Upon examination patient states that most of his pain is located in his upper back between his shoulder blades  He also complains of pain to his left shoulder, down his left arm and left rib cage  ROS: Review of Systems   Constitutional: Negative for chills, diaphoresis, fatigue and fever  Respiratory: Negative for cough, chest tightness, shortness of breath and wheezing  Cardiovascular: Negative for chest pain, palpitations and leg swelling  Musculoskeletal: Positive for arthralgias, back pain and myalgias  Negative for gait problem, neck pain and neck stiffness          Pain upper back between shoulder blades   Skin: Negative for color change, pallor, rash and wound  Neurological: Negative for dizziness, weakness, numbness and headaches  Allergies   Allergen Reactions    Sulfa Antibiotics Hives       Medications:    Current Outpatient Medications on File Prior to Visit   Medication Sig Dispense Refill    aspirin (ECOTRIN LOW STRENGTH) 81 mg EC tablet Take 81 mg by mouth daily      atorvastatin (LIPITOR) 20 mg tablet Take 1 tablet (20 mg total) by mouth daily 90 tablet 2    cholecalciferol (VITAMIN D3) 1,000 units tablet Take 1,000 Units by mouth daily      Coenzyme Q10 (CoQ10) 100 MG CAPS Take by mouth daily       cyanocobalamin (VITAMIN B-12) 100 mcg tablet Take by mouth daily      Glucosamine-Chondroit-Vit C-Mn (GLUCOSAMINE CHONDR 1500 COMPLX PO) Take by mouth daily       metroNIDAZOLE (METROGEL) 0 75 % gel       Multiple Vitamin (multivitamin) capsule Take 1 capsule by mouth daily      Omega 3 1000 MG CAPS Take by mouth daily       clotrimazole-betamethasone (LOTRISONE) 1-0 05 % cream Apply topically 2 (two) times a day 30 g 0    docusate sodium (COLACE) 100 mg capsule Take 1 capsule (100 mg total) by mouth 2 (two) times a day for 15 days 30 capsule 0    Mirabegron ER 25 MG TB24 Take 25 mg by mouth daily at bedtime 30 tablet 10    trospium chloride (SANCTURA) 20 mg tablet Take 1 tablet (20 mg total) by mouth 2 (two) times a day 60 tablet 3     No current facility-administered medications on file prior to visit         History:  Past Medical History:   Diagnosis Date    Irregular heart beat     Afib     Past Surgical History:   Procedure Laterality Date    CARDIAC SURGERY      Afib ablation    CATARACT EXTRACTION, BILATERAL Bilateral [id number]    LA CYSTOURETHRO W/IMPLANT N/A 5/28/2021    Procedure: Tensed Brilliant WITH INSERTION [address];  Surgeon: Shari Rincon MD;  [facility];  Service: Urology     Family History   Problem Relation Age of Onset    Pancreatic cancer Mother     Liver cancer Father     Cancer Brother      Social History     Socioeconomic History    Marital status:      Spouse name: Not on file    Number of children: Not on file    Years of education: Not on file    Highest education level: Not on file   Occupational History    Not on file   Tobacco Use    Smoking status: Former Smoker     Types: Cigarettes     Start date: 10/8/1961     Quit date: 10/8/1977     Years since quittin 3    Smokeless tobacco: Never Used   Vaping Use    Vaping Use: Never used   Substance and Sexual Activity    Alcohol use: Yes     Comment: occasional wine    Drug use: Never    Sexual activity: Not Currently   Other Topics Concern    Not on file   Social History Narrative    Not on file     Social Determinants of Health     Financial Resource Strain: Not on file   Food Insecurity: Not on file   Transportation Needs: Not on file   Physical Activity: Not on file   Stress: Not on file   Social Connections: Not on file   Intimate Partner Violence: Not on file   Housing Stability: Not on file     Past Surgical History:   Procedure Laterality Date    CARDIAC SURGERY      Afib ablation    CATARACT EXTRACTION, BILATERAL Bilateral [id number]    NV CYSTOURETHRO W/IMPLANT N/A 2021    Procedure: CYSTOSCOPY WITH INSERTION Lacey Oats;  Surgeon: Verner Squires, MD;  Location[facility];  Service: Urology       OBJECTIVE:  Vitals:    22 1255   BP: 134/72   BP Location: Left arm   Patient Position: Sitting   Cuff Size: Adult   Pulse: 56   Resp: 20   Temp: (!) 97 3 °F (36 3 °C)   TempSrc: Tympanic   SpO2: 99%   Weight: 94 6 kg (208 lb 9 6 oz)   Height: 6' 1 5" (1 867 m)     Body mass index is 27 15 kg/m²  Physical Exam  Constitutional:       General: He is not in acute distress  Appearance: Normal appearance  He is normal weight  He is not ill-appearing, toxic-appearing or diaphoretic  HENT:      Head: Normocephalic and atraumatic  Right Ear: External ear normal       Left Ear: External ear normal    Cardiovascular:      Rate and Rhythm: Normal rate and regular rhythm  Pulses: Normal pulses  Heart sounds: Murmur heard  Pulmonary:      Effort: Pulmonary effort is normal  No respiratory distress  Breath sounds: Normal breath sounds  No wheezing, rhonchi or rales  Musculoskeletal:         General: Tenderness present  Normal range of motion  Comments: Upper thoracic spine tenderness upon palpation  Left rib cage tenderness upon palpation   Skin:     General: Skin is warm and dry  Coloration: Skin is not pale  Findings: Erythema present  No bruising or rash  Comments: Anterior left rib cage erythema present   Neurological:      General: No focal deficit present  Mental Status: He is alert  Mental status is at baseline  Motor: No weakness  Gait: Gait normal    Psychiatric:         Mood and Affect: Mood normal          Behavior: Behavior normal          Thought Content: Thought content normal          Judgment: Judgment normal        Labs & Imaging:  Lab Results   Component Value Date    WBC 5 98 01/08/2021    HGB 14 9 01/08/2021    HCT 46 6 01/08/2021    MCV 96 01/08/2021     01/08/2021     Lab Results   Component Value Date    SODIUM 142 01/08/2021    K 4 3 01/08/2021     (H) 01/08/2021    CO2 30 01/08/2021    BUN 31 (H) 01/08/2021    CREATININE 1 24 01/08/2021    CALCIUM 9 6 01/08/2021     No results found for: Earnesteen Huge  Lab Results   Component Value Date    [id number] 2 670 01/08/2021     No results found for: [id number], [id number]   No results found for this or any previous visit

## 2022-01-21 ENCOUNTER — TELEPHONE (OUTPATIENT)
Dept: GERIATRICS | Age: 82
End: 2022-01-21

## 2022-01-21 DIAGNOSIS — M54.6 THORACIC SPINE PAIN: Primary | ICD-10-CM

## 2022-01-21 RX ORDER — OXYCODONE HYDROCHLORIDE 5 MG/1
5 TABLET ORAL EVERY 6 HOURS PRN
Qty: 56 TABLET | Refills: 0 | Status: SHIPPED | OUTPATIENT
Start: 2022-01-21 | End: 2022-02-04

## 2022-01-21 NOTE — TELEPHONE ENCOUNTER
Patient called regarding increased pain in the left shoulder, upper back between shoulder blades, and now in the left rib cage area  Patient taking Tylenol and using SalanPas as recommended by provider on 1/19/22, however patient is not getting relief  Patient describes his pain as a "4/5 and 5/6" on a 0-10 pain scale  Patient is inquiring if a stronger pain medication can be prescribed to Jeanne Crump  Patient also inquiring if x-rays have been resulted  Please advise

## 2022-01-21 NOTE — TELEPHONE ENCOUNTER
FYI: Oxycodone 5 mg, 1 tablet every 6 hours prn severe pain and 2 5 mg , 1/2 tablet every 6 hours prn moderate pain ordered
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
## 2022-01-23 NOTE — ASSESSMENT & PLAN NOTE
· Status post unwitnessed mechanical fall on the ice on 01/18/2022  · Patient presents with increased pain in his thoracic spine, left shoulder and left rib cage  · Will order x-rays as below  · Recommend taking extra strength Tylenol three times daily and use a lidocaine patch to the affected area on for 12 hours and off for 12 hours  · May alternate between heat and ice for 20 minute intervals as tolerated  · Encouraged cough and deep breathing exercises due to rib pain  · Follow-up in 2 weeks

## 2022-02-02 ENCOUNTER — OFFICE VISIT (OUTPATIENT)
Dept: GERIATRICS | Age: 82
End: 2022-02-02
Payer: MEDICARE

## 2022-02-02 VITALS
RESPIRATION RATE: 16 BRPM | HEART RATE: 50 BPM | WEIGHT: 221.2 LBS | SYSTOLIC BLOOD PRESSURE: 138 MMHG | OXYGEN SATURATION: 98 % | BODY MASS INDEX: 28.39 KG/M2 | TEMPERATURE: 97 F | HEIGHT: 74 IN | DIASTOLIC BLOOD PRESSURE: 66 MMHG

## 2022-02-02 DIAGNOSIS — M43.10 DEGENERATIVE SPONDYLOLISTHESIS: ICD-10-CM

## 2022-02-02 DIAGNOSIS — S22.32XD CLOSED FRACTURE OF ONE RIB OF LEFT SIDE WITH ROUTINE HEALING: Primary | ICD-10-CM

## 2022-02-02 PROCEDURE — 99213 OFFICE O/P EST LOW 20 MIN: CPT | Performed by: NURSE PRACTITIONER

## 2022-02-02 RX ORDER — HYDROCODONE BITARTRATE AND ACETAMINOPHEN 5; 325 MG/1; MG/1
1 TABLET ORAL EVERY 6 HOURS PRN
COMMUNITY
Start: 2021-11-11 | End: 2022-07-19

## 2022-02-02 NOTE — PATIENT INSTRUCTIONS
1  Recommend using an Incentive Spirometer, use 10 times at least three time daily  2  Follow-up in April for your annual wellness examination  3   Notify office for worsening signs and symptoms

## 2022-02-02 NOTE — PROGRESS NOTES
ASSESSMENT AND PLAN:  1  Closed fracture of one rib of left side with routine healing  Assessment & Plan:  · Continue pain management with oxycodone, tylenol and heating pad  · Recommend cough and deep breathing exercises  · Reviewed incentive spirometry, patient states that he is going to buy one off of [address]  Recommend using 10 times at least 3 times per day  2  Degenerative spondylolisthesis  Assessment & Plan:  · Continue PT services using Tens unit  · Continue oxycodone, tylenol and heating pad for pain  · Patient to notify office if pain does not subside  Will consider a CT of his thoracic spine if no improvement or worsening signs or symptoms  HPI:    Tavo Soto is a 78year old male patient seen and examined today for complaint of stiffness and achiness of of his back and ribs  He is reported to have fallen on the ice on 01/18/2022  He lives in residential living at [facility] and went to the wellness center there after the fall  He was given a stability test by therapy and was deemed okay  He is taking Tylenol with relief  His blood pressure was 155/73, HR 53 after the fall  He is status post thoracic spine, left ribs and left shoulder x-ray on 01/19/2022  X-ray of his T-spine shows mild multilevel degenerative spondylosis  Dextroscoliosis, no acute osseous abnormality  X-ray of his left ribs and chest reveal linear opacities at the left base consistent with atelectasis and nondisplaced fracture of left 6th rib  X-ray of left shoulder reveals no acute osseous abnormality  Upon examination, patient states "Im doing well"  He states that he is now taking oxycodone, one in the morning and one at night  When he started taking oxycodone, he was taking 4 a day  He is doing therapy 3 times per week with pain management using the  tens unit  He states that the  pain remains between his shoulder blades, between T1 and T2  He is using a heating pad with some relief   He no longer is using the lidocaine patch due to it being ineffective  He states that he is walking 3 miles per day  ROS: Review of Systems   Constitutional: Negative for chills, fatigue and fever  Respiratory: Negative for cough, chest tightness, shortness of breath and wheezing  Cardiovascular: Negative for chest pain, palpitations and leg swelling  Gastrointestinal: Negative for abdominal pain, constipation, diarrhea and vomiting  Musculoskeletal: Positive for back pain and myalgias  Negative for gait problem  T1 to T 2 painful worse with position changes  Rates 0 5 to 1  Neurological: Negative for weakness  Psychiatric/Behavioral: Negative for sleep disturbance  Allergies   Allergen Reactions    Sulfa Antibiotics Hives       Medications:    Current Outpatient Medications on File Prior to Visit   Medication Sig Dispense Refill    atorvastatin (LIPITOR) 20 mg tablet Take 1 tablet (20 mg total) by mouth daily 90 tablet 2    cholecalciferol (VITAMIN D3) 1,000 units tablet Take 1,000 Units by mouth daily      Coenzyme Q10 (CoQ10) 100 MG CAPS Take by mouth daily       cyanocobalamin (VITAMIN B-12) 100 mcg tablet Take by mouth daily      HYDROcodone-acetaminophen (NORCO) 5-325 mg per tablet Take 1 tablet by mouth every 6 (six) hours as needed      metroNIDAZOLE (METROGEL) 0 75 % gel       Multiple Vitamin (multivitamin) capsule Take 1 capsule by mouth daily      Omega 3 1000 MG CAPS Take by mouth daily       [] oxyCODONE (Roxicodone) 5 immediate release tablet Take 1 tablet (5 mg total) by mouth every 6 (six) hours as needed for severe pain (Take 1/2 tablet, 2 5 mg  every 6 hours as needed for moderate pain   Take one tablet, 5 mg, every 6 hours as needed for severe pain) for up to 14 days Max Daily Amount: 20 mg 56 tablet 0    aspirin (ECOTRIN LOW STRENGTH) 81 mg EC tablet Take 81 mg by mouth daily (Patient not taking: Reported on 2022 )      clotrimazole-betamethasone (LOTRISONE) 1-0 05 % cream Apply topically 2 (two) times a day 30 g 0    docusate sodium (COLACE) 100 mg capsule Take 1 capsule (100 mg total) by mouth 2 (two) times a day for 15 days 30 capsule 0    Glucosamine-Chondroit-Vit C-Mn (GLUCOSAMINE CHONDR 1500 COMPLX PO) Take by mouth daily  (Patient not taking: Reported on 2022 )      Mirabegron ER 25 MG TB24 Take 25 mg by mouth daily at bedtime 30 tablet 10    trospium chloride (SANCTURA) 20 mg tablet Take 1 tablet (20 mg total) by mouth 2 (two) times a day 60 tablet 3     No current facility-administered medications on file prior to visit  History:  Past Medical History:   Diagnosis Date    Irregular heart beat     Afib     Past Surgical History:   Procedure Laterality Date    CARDIAC SURGERY      Afib ablation    CATARACT EXTRACTION, BILATERAL Bilateral [id number]    NM CYSTOURETHRO W/IMPLANT N/A 2021    Procedure: Charline Chaudhari;  Surgeon: Colt Sanchez MD;  [facility];  Service: Urology     Family History   Problem Relation Age of Onset    Pancreatic cancer Mother     Liver cancer Father     Cancer Brother      Social History     Socioeconomic History    Marital status:       Spouse name: Not on file    Number of children: Not on file    Years of education: Not on file    Highest education level: Not on file   Occupational History    Not on file   Tobacco Use    Smoking status: Former Smoker     Types: Cigarettes     Start date: 10/8/1961     Quit date: 10/8/1977     Years since quittin 3    Smokeless tobacco: Never Used   Vaping Use    Vaping Use: Never used   Substance and Sexual Activity    Alcohol use: Yes     Comment: occasional wine    Drug use: Never    Sexual activity: Not Currently   Other Topics Concern    Not on file   Social History Narrative    Not on file     Social Determinants of Health     Financial Resource Strain: Not on file   Food Insecurity: Not on file Transportation Needs: Not on file   Physical Activity: Not on file   Stress: Not on file   Social Connections: Not on file   Intimate Partner Violence: Not on file   Housing Stability: Not on file     Past Surgical History:   Procedure Laterality Date    CARDIAC SURGERY      Afib ablation    CATARACT EXTRACTION, BILATERAL Bilateral [id number]    HI CYSTOURETHRO W/IMPLANT N/A 5/28/2021    Procedure: [address];  Surgeon: Henna Dillard MD;  [facility];  Service: Urology       OBJECTIVE:  Vitals:    02/02/22 1044   BP: 138/66   BP Location: Left arm   Patient Position: Sitting   Cuff Size: Adult   Pulse: (!) 50   Resp: 16   Temp: (!) 97 °F (36 1 °C)   TempSrc: Temporal   SpO2: 98%   Weight: 100 kg (221 lb 3 2 oz)   Height: 6' 1 5" (1 867 m)     Body mass index is 28 79 kg/m²  Physical Exam  Constitutional:       General: He is not in acute distress  Appearance: Normal appearance  He is not ill-appearing, toxic-appearing or diaphoretic  HENT:      Right Ear: External ear normal       Left Ear: External ear normal    Cardiovascular:      Rate and Rhythm: Normal rate and regular rhythm  Pulses: Normal pulses  Musculoskeletal:         General: No tenderness  Normal range of motion  Right lower leg: Edema present  Left lower leg: Edema present  Comments: 2+ pitting edema   Neurological:      Mental Status: He is alert           Labs & Imaging:  Lab Results   Component Value Date    WBC 5 98 01/08/2021    HGB 14 9 01/08/2021    HCT 46 6 01/08/2021    MCV 96 01/08/2021     01/08/2021     Lab Results   Component Value Date    SODIUM 142 01/08/2021    K 4 3 01/08/2021     (H) 01/08/2021    CO2 30 01/08/2021    BUN 31 (H) 01/08/2021    CREATININE 1 24 01/08/2021    CALCIUM 9 6 01/08/2021     No results found for: [id number]  Lab Results   Component Value Date    [id number] 2 670 01/08/2021     No results found for: [id number], [id number]   No results found for this or any previous visit

## 2022-02-05 NOTE — ASSESSMENT & PLAN NOTE
· Continue PT services using Tens unit  · Continue oxycodone, tylenol and heating pad for pain  · Patient to notify office if pain does not subside  Will consider a CT of his thoracic spine if no improvement or worsening signs or symptoms

## 2022-02-05 NOTE — ASSESSMENT & PLAN NOTE
· Continue pain management with oxycodone, tylenol and heating pad  · Recommend cough and deep breathing exercises  · Reviewed incentive spirometry, patient states that he is going to buy one off of [employer]  Recommend using 10 times at least 3 times per day

## 2022-02-25 ENCOUNTER — TELEPHONE (OUTPATIENT)
Dept: GERIATRICS | Age: 82
End: 2022-02-25

## 2022-02-25 NOTE — TELEPHONE ENCOUNTER
Faxed request for patient's immunization records to [facility] in order to close immunization Care Gaps in [address] chart

## 2022-03-03 ENCOUNTER — OFFICE VISIT (OUTPATIENT)
Dept: CARDIOLOGY CLINIC | Facility: CLINIC | Age: 82
End: 2022-03-03
Payer: MEDICARE

## 2022-03-03 VITALS
HEART RATE: 49 BPM | OXYGEN SATURATION: 99 % | WEIGHT: 210 LBS | SYSTOLIC BLOOD PRESSURE: 124 MMHG | DIASTOLIC BLOOD PRESSURE: 72 MMHG | BODY MASS INDEX: 26.95 KG/M2 | HEIGHT: 74 IN

## 2022-03-03 DIAGNOSIS — R00.1 SINUS BRADYCARDIA: ICD-10-CM

## 2022-03-03 DIAGNOSIS — I25.84 CORONARY ARTERY DISEASE DUE TO CALCIFIED CORONARY LESION: Primary | ICD-10-CM

## 2022-03-03 DIAGNOSIS — I49.3 PVC (PREMATURE VENTRICULAR CONTRACTION): ICD-10-CM

## 2022-03-03 DIAGNOSIS — I25.10 CORONARY ARTERY DISEASE DUE TO CALCIFIED CORONARY LESION: Primary | ICD-10-CM

## 2022-03-03 DIAGNOSIS — I48.0 PAROXYSMAL ATRIAL FIBRILLATION (HCC): ICD-10-CM

## 2022-03-03 DIAGNOSIS — E78.00 PURE HYPERCHOLESTEROLEMIA: ICD-10-CM

## 2022-03-03 DIAGNOSIS — I49.8 BIGEMINY: ICD-10-CM

## 2022-03-03 PROCEDURE — 99214 OFFICE O/P EST MOD 30 MIN: CPT | Performed by: INTERNAL MEDICINE

## 2022-03-03 PROCEDURE — 93000 ELECTROCARDIOGRAM COMPLETE: CPT | Performed by: INTERNAL MEDICINE

## 2022-03-17 ENCOUNTER — HOSPITAL ENCOUNTER (OUTPATIENT)
Dept: NON INVASIVE DIAGNOSTICS | Facility: CLINIC | Age: 82
Discharge: HOME | End: 2022-03-17
Payer: MEDICARE

## 2022-03-17 DIAGNOSIS — R00.1 SINUS BRADYCARDIA: ICD-10-CM

## 2022-03-17 DIAGNOSIS — I48.0 PAROXYSMAL ATRIAL FIBRILLATION (HCC): ICD-10-CM

## 2022-03-17 DIAGNOSIS — I49.3 PVC (PREMATURE VENTRICULAR CONTRACTION): ICD-10-CM

## 2022-03-17 DIAGNOSIS — I25.10 CORONARY ARTERY DISEASE DUE TO CALCIFIED CORONARY LESION: ICD-10-CM

## 2022-03-17 DIAGNOSIS — I25.84 CORONARY ARTERY DISEASE DUE TO CALCIFIED CORONARY LESION: ICD-10-CM

## 2022-03-17 DIAGNOSIS — I49.8 BIGEMINY: ICD-10-CM

## 2022-03-17 PROCEDURE — 93225 XTRNL ECG REC<48 HRS REC: CPT

## 2022-03-17 PROCEDURE — 93226 XTRNL ECG REC<48 HR SCAN A/R: CPT

## 2022-03-24 PROCEDURE — 93227 XTRNL ECG REC<48 HR R&I: CPT | Performed by: INTERNAL MEDICINE

## 2022-04-07 ENCOUNTER — OFFICE VISIT (OUTPATIENT)
Dept: GERIATRICS | Age: 82
End: 2022-04-07
Payer: MEDICARE

## 2022-04-07 VITALS
RESPIRATION RATE: 18 BRPM | SYSTOLIC BLOOD PRESSURE: 142 MMHG | WEIGHT: 211.4 LBS | HEIGHT: 74 IN | DIASTOLIC BLOOD PRESSURE: 58 MMHG | OXYGEN SATURATION: 97 % | TEMPERATURE: 97.8 F | BODY MASS INDEX: 27.13 KG/M2 | HEART RATE: 60 BPM

## 2022-04-07 DIAGNOSIS — G47.33 OSA (OBSTRUCTIVE SLEEP APNEA): ICD-10-CM

## 2022-04-07 DIAGNOSIS — I25.10 CORONARY ARTERY DISEASE DUE TO CALCIFIED CORONARY LESION: ICD-10-CM

## 2022-04-07 DIAGNOSIS — I25.84 CORONARY ARTERY DISEASE DUE TO CALCIFIED CORONARY LESION: ICD-10-CM

## 2022-04-07 DIAGNOSIS — R68.89 OTHER GENERAL SYMPTOMS AND SIGNS: ICD-10-CM

## 2022-04-07 DIAGNOSIS — R89.9 ABNORMAL LABORATORY TEST: ICD-10-CM

## 2022-04-07 DIAGNOSIS — R79.9 ABNORMAL FINDING OF BLOOD CHEMISTRY, UNSPECIFIED: ICD-10-CM

## 2022-04-07 DIAGNOSIS — Z00.00 MEDICARE ANNUAL WELLNESS VISIT, SUBSEQUENT: ICD-10-CM

## 2022-04-07 DIAGNOSIS — I48.0 PAROXYSMAL ATRIAL FIBRILLATION (HCC): Primary | ICD-10-CM

## 2022-04-07 DIAGNOSIS — R03.0 ELEVATED BP WITHOUT DIAGNOSIS OF HYPERTENSION: ICD-10-CM

## 2022-04-07 DIAGNOSIS — E78.00 PURE HYPERCHOLESTEROLEMIA: ICD-10-CM

## 2022-04-07 DIAGNOSIS — N18.31 STAGE 3A CHRONIC KIDNEY DISEASE (HCC): ICD-10-CM

## 2022-04-07 DIAGNOSIS — R00.1 SINUS BRADYCARDIA: ICD-10-CM

## 2022-04-07 DIAGNOSIS — H91.93 DECREASED HEARING OF BOTH EARS: ICD-10-CM

## 2022-04-07 PROCEDURE — 99215 OFFICE O/P EST HI 40 MIN: CPT | Performed by: STUDENT IN AN ORGANIZED HEALTH CARE EDUCATION/TRAINING PROGRAM

## 2022-04-07 PROCEDURE — G0439 PPPS, SUBSEQ VISIT: HCPCS | Performed by: STUDENT IN AN ORGANIZED HEALTH CARE EDUCATION/TRAINING PROGRAM

## 2022-04-07 NOTE — PROGRESS NOTES
Assessment and Plan:     Problem List Items Addressed This Visit        Respiratory    LIOR (obstructive sleep apnea)    Relevant Orders    Ambulatory Referral to Sleep Medicine       Cardiovascular and Mediastinum    Coronary artery disease due to calcified coronary lesion    Paroxysmal atrial fibrillation (HCC)       Other    Pure hypercholesterolemia    Relevant Orders    Lipid panel    Abnormal laboratory test    Relevant Orders    Hemoglobin A1C    Elevated BP without diagnosis of hypertension    Relevant Orders    CBC and differential    Comprehensive metabolic panel    TSH, 3rd generation with T4 reflex    Medicare annual wellness visit, subsequent - Primary      Other Visit Diagnoses     Other general symptoms and signs         Relevant Orders    TSH, 3rd generation with T4 reflex    Abnormal finding of blood chemistry, unspecified         Relevant Orders    Hemoglobin A1C          Depression Screening and Follow-up Plan: Patient was screened for depression during today's encounter  They screened negative with a PHQ-2 score of 0  Preventive health issues were discussed with patient, and age appropriate screening tests were ordered as noted in patient's After Visit Summary  Personalized health advice and appropriate referrals for health education or preventive services given if needed, as noted in patient's After Visit Summary  History of Present Illness:     Patient presents for Medicare Annual Wellness visit  Patient feels in good health  He is active, walking outside usually 1 hour a day  His food is provided by his independent living facility  He says it is generally low in salt and we advised against adding salt to his food  He is eating regular meals  He had one fall this year when he tripped on ice while walking  He had a broken rib and back pain which resolved after physical therapy  He sees his dentist twice a year   He is seeing an oral surgeon for a tooth implant that dislodged  He is due to see an eye doctor this year  Encouraged him to make an appointment  He wears his seatbelt  His living facility has smoke detectors  He is unsure if there are carbon monoxide detectors  Reviewed immunizations  He is up to date  He received his second COVID booster earlier this week  Patient is a former smoker but this was more than 40 years ago and he smoked 1 pack a week  He drinks 4 glasses of wine a week  Cancer screening reviewed  He is up to date  Reported last colonoscopy was 5 years ago and normal      He has a POA and living will  He will bring us those documents at his next appointment  Reviewed lab work from last year  Will reorder labs this year  Advised against NSAID use in the setting of CKD  Patient is concerned about recent Holter monitor results  Advised him to call his cardiologist to discuss results       Patient Care Team:  Reyes Pare, MD as PCP - General (Geriatric Medicine)     Problem List:     Patient Active Problem List   Diagnosis    Coronary artery disease due to calcified coronary lesion    [facility]    Pure hypercholesterolemia    Urge incontinence    Benign prostatic hyperplasia with lower urinary tract symptoms    H/O laminectomy    Other fatigue    Stage 3a chronic kidney disease ([facility] )    LIOR (obstructive sleep apnea)    PLMD (periodic limb movement disorder)    Sinus bradycardia    PVC (premature ventricular contraction)    Bigeminy    Rosacea    Paroxysmal atrial fibrillation (HCC)    Status post radiofrequency ablation (RFA) operation for arrhythmia    Decreased hearing of both ears    OAB (overactive bladder)    Intertrigo    Abnormal laboratory test    Scalp lesion    Toenail fungus    Elevated BP without diagnosis of hypertension    Acute midline thoracic back pain    Rib pain on left side    Acute pain of left shoulder    Fall    Closed fracture of one rib of left side with routine healing    Degenerative spondylolisthesis    Medicare annual wellness visit, subsequent      Past Medical and Surgical History:     Past Medical History:   Diagnosis Date    Bladder cancer ([address] )     Irregular heart beat     Afib     Past Surgical History:   Procedure Laterality Date    CARDIAC SURGERY      Afib ablation    CATARACT EXTRACTION, BILATERAL Bilateral [id number]    WA CYSTOURETHRO W/IMPLANT N/A 2021    Procedure: Lisandra Prophet;  Surgeon: Heavenly Higgins MD;  Location: [facility];  Service: Urology      Family History:     Family History   Problem Relation Age of Onset    Pancreatic cancer Mother     Liver cancer Father     Cancer Brother       Social History:     Social History     Socioeconomic History    Marital status:       Spouse name: Not on file    Number of children: Not on file    Years of education: Not on file    Highest education level: Not on file   Occupational History    Not on file   Tobacco Use    Smoking status: Former Smoker     Types: Cigarettes     Start date: 10/8/1961     Quit date: 10/8/1977     Years since quittin 5    Smokeless tobacco: Never Used   Vaping Use    Vaping Use: Never used   Substance and Sexual Activity    Alcohol use: Yes     Comment: occasional wine    Drug use: Never    Sexual activity: Not Currently   Other Topics Concern    Not on file   Social History Narrative    Not on file     Social Determinants of Health     Financial Resource Strain: Not on file   Food Insecurity: Not on file   Transportation Needs: Not on file   Physical Activity: Not on file   Stress: Not on file   Social Connections: Not on file   Intimate Partner Violence: Not on file   Housing Stability: Not on file      Medications and Allergies:     Current Outpatient Medications   Medication Sig Dispense Refill    aspirin (ECOTRIN LOW STRENGTH) 81 mg EC tablet Take 81 mg by mouth daily        atorvastatin (LIPITOR) 20 mg tablet Take 1 tablet (20 mg total) by mouth daily 90 tablet 2    cholecalciferol (VITAMIN D3) 1,000 units tablet Take 1,000 Units by mouth daily      Coenzyme Q10 (CoQ10) 100 MG CAPS Take by mouth daily       cyanocobalamin (VITAMIN B-12) 100 mcg tablet Take by mouth daily      Glucosamine-Chondroit-Vit C-Mn (GLUCOSAMINE CHONDR 1500 COMPLX PO) Take by mouth daily        metroNIDAZOLE (METROGEL) 0 75 % gel       Multiple Vitamin (multivitamin) capsule Take 1 capsule by mouth daily      Omega 3 1000 MG CAPS Take by mouth daily       clotrimazole-betamethasone (LOTRISONE) 1-0 05 % cream Apply topically 2 (two) times a day (Patient not taking: Reported on 3/3/2022 ) 30 g 0    docusate sodium (COLACE) 100 mg capsule Take 1 capsule (100 mg total) by mouth 2 (two) times a day for 15 days 30 capsule 0    HYDROcodone-acetaminophen (NORCO) 5-325 mg per tablet Take 1 tablet by mouth every 6 (six) hours as needed (Patient not taking: Reported on 3/3/2022 )      Mirabegron ER 25 MG TB24 Take 25 mg by mouth daily at bedtime (Patient not taking: Reported on 3/3/2022 ) 30 tablet 10    trospium chloride (SANCTURA) 20 mg tablet Take 1 tablet (20 mg total) by mouth 2 (two) times a day 60 tablet 3     No current facility-administered medications for this visit       Allergies   Allergen Reactions    Sulfa Antibiotics Hives      Immunizations:     Immunization History   Administered Date(s) Administered    COVID-19 MODERNA VACC 0 5 ML IM 01/26/2021, 02/24/2021, 10/27/2021    DT (pediatric) 08/12/2012    INFLUENZA 10/02/2015, 10/17/2016, 09/01/2017, 09/30/2017, 09/15/2018, 09/26/2018, 08/28/2020    Influenza Split 01/01/2011, 01/01/2012, 09/27/2013    Influenza Split High Dose Preservative Free IM 09/26/2018    Pneumococcal Conjugate 13-Valent 03/03/2015    Pneumococcal Polysaccharide PPV23 09/12/2016    Zoster 11/30/2012    Zoster Vaccine Recombinant 07/21/2019, 10/08/2019      Health Maintenance: There are no preventive care reminders to display for this patient  Topic Date Due    DTaP,Tdap,and Td Vaccines (1 - Tdap) 08/13/2012    Influenza Vaccine (1) 09/01/2021      Medicare Health Risk Assessment:     /58 (BP Location: Left arm, Patient Position: Sitting, Cuff Size: Standard)   Pulse 60   Temp 97 8 °F (36 6 °C) (Temporal)   Resp 18   Ht 6' 1 62" (1 87 m)   Wt 95 9 kg (211 lb 6 4 oz)   SpO2 97%   BMI 27 42 kg/m²          Health Risk Assessment:   Patient rates overall health as excellent  Patient feels that their physical health rating is same  Patient is very satisfied with their life  Eyesight was rated as same  Hearing was rated as same  Patient feels that their emotional and mental health rating is same  Patients states they are never, rarely angry  Patient states they are sometimes unusually tired/fatigued  Pain experienced in the last 7 days has been none  Patient states that he has experienced no weight loss or gain in last 6 months  Depression Screening:   PHQ-2 Score: 0      Fall Risk Screening: In the past year, patient has experienced: history of falling in past year    Number of falls: 1  Injured during fall?: Yes    Feels unsteady when standing or walking?: No    Worried about falling?: No      Home Safety:  Patient does not have trouble with stairs inside or outside of their home  Patient has working smoke alarms and has working carbon monoxide detector  Home safety hazards include: none  Nutrition:   Current diet is Regular  Medications:   Patient is currently taking over-the-counter supplements  OTC medications include: see medication list  Patient is able to manage medications  Activities of Daily Living (ADLs)/Instrumental Activities of Daily Living (IADLs):   Walk and transfer into and out of bed and chair?: Yes  Dress and groom yourself?: Yes    Bathe or shower yourself?: Yes    Feed yourself?  Yes  Do your laundry/housekeeping?: Yes  Manage your money, pay your bills and track your expenses?: Yes  Make your own meals?: Yes    Do your own shopping?: Yes    Previous Hospitalizations:   Any hospitalizations or ED visits within the last 12 months?: No      PREVENTIVE SCREENINGS      Cardiovascular Screening:    General: Screening Not Indicated and History Lipid Disorder      Diabetes Screening:     General: Screening Current      Prostate Cancer Screening:    General: Screening Not Indicated      Abdominal Aortic Aneurysm (AAA) Screening:    Risk factors include: tobacco use        Lung Cancer Screening:     General: Screening Not Indicated    Screening, Brief Intervention, and Referral to Treatment (SBIRT)    Screening  Typical number of drinks in a day: 1  Typical number of drinks in a week: 1  Interpretation: Low risk drinking behavior      Single Item Drug Screening:  How often have you used an illegal drug (including marijuana) or a prescription medication for non-medical reasons in the past year? never    Single Item Drug Screen Score: 0  Interpretation: Negative screen for possible drug use disorder      Dhruv Balderrama MD

## 2022-04-07 NOTE — PATIENT INSTRUCTIONS
Medicare Preventive Visit Patient Instructions  Thank you for completing your Welcome to Medicare Visit or Medicare Annual Wellness Visit today  Your next wellness visit will be due in one year (4/8/2023)  The screening/preventive services that you may require over the next 5-10 years are detailed below  Some tests may not apply to you based off risk factors and/or age  Screening tests ordered at today's visit but not completed yet may show as past due  Also, please note that scanned in results may not display below  Preventive Screenings:  Service Recommendations Previous Testing/Comments   Colorectal Cancer Screening  · Colonoscopy    · Fecal Occult Blood Test (FOBT)/Fecal Immunochemical Test (FIT)  · Fecal DNA/Cologuard Test  · Flexible Sigmoidoscopy Age: 54-65 years old   Colonoscopy: every 10 years (May be performed more frequently if at higher risk)  OR  FOBT/FIT: every 1 year  OR  Cologuard: every 3 years  OR  Sigmoidoscopy: every 5 years  Screening may be recommended earlier than age 48 if at higher risk for colorectal cancer  Also, an individualized decision between you and your healthcare provider will decide whether screening between the ages of 74-80 would be appropriate   Colonoscopy: Not on file  FOBT/FIT: Not on file  Cologuard: Not on file  Sigmoidoscopy: Not on file          Prostate Cancer Screening Individualized decision between patient and health care provider in men between ages of 53-78   Medicare will cover every 12 months beginning on the day after your 50th birthday PSA: No results in last 5 years     Screening Not Indicated     Hepatitis C Screening Once for adults born between [facility]  More frequently in patients at high risk for Hepatitis C Hep C Antibody: Not on file        Diabetes Screening 1-2 times per year if you're at risk for diabetes or have pre-diabetes Fasting glucose: 77 mg/dL   A1C: 5 6    Screening Current   Cholesterol Screening Once every 5 years if you don't have a lipid disorder  May order more often based on risk factors  Lipid panel: 01/08/2021    Screening Not Indicated  History Lipid Disorder      Other Preventive Screenings Covered by Medicare:  1  Abdominal Aortic Aneurysm (AAA) Screening: covered once if your at risk  You're considered to be at risk if you have a family history of AAA or a male between the age of 73-68 who smoking at least 100 cigarettes in your lifetime  2  Lung Cancer Screening: covers low dose CT scan once per year if you meet all of the following conditions: (1) Age 50-69; (2) No signs or symptoms of lung cancer; (3) Current smoker or have quit smoking within the last 15 years; (4) You have a tobacco smoking history of at least 30 pack years (packs per day x number of years you smoked); (5) You get a written order from a healthcare provider  3  Glaucoma Screening: covered annually if you're considered high risk: (1) You have diabetes OR (2) Family history of glaucoma OR (3)  aged 48 and older OR (3)  American aged 72 and older  3  Osteoporosis Screening: covered every 2 years if you meet one of the following conditions: (1) Have a vertebral abnormality; (2) On glucocorticoid therapy for more than 3 months; (3) Have primary hyperparathyroidism; (4) On osteoporosis medications and need to assess response to drug therapy  5  HIV Screening: covered annually if you're between the age of 12-76  Also covered annually if you are younger than 13 and older than 72 with risk factors for HIV infection  For pregnant patients, it is covered up to 3 times per pregnancy      Immunizations:  Immunization Recommendations   Influenza Vaccine Annual influenza vaccination during flu season is recommended for all persons aged >= 6 months who do not have contraindications   Pneumococcal Vaccine (Prevnar and Pneumovax)  * Prevnar = PCV13  * Pneumovax = PPSV23 Adults 25-60 years old: 1-3 doses may be recommended based on certain risk factors  Adults 72 years old: Prevnar (PCV13) vaccine recommended followed by Pneumovax (PPSV23) vaccine  If already received PPSV23 since turning 65, then PCV13 recommended at least one year after PPSV23 dose  Hepatitis B Vaccine 3 dose series if at intermediate or high risk (ex: diabetes, end stage renal disease, liver disease)   Tetanus (Td) Vaccine - COST NOT COVERED BY MEDICARE PART B Following completion of primary series, a booster dose should be given every 10 years to maintain immunity against tetanus  Td may also be given as tetanus wound prophylaxis  Tdap Vaccine - COST NOT COVERED BY MEDICARE PART B Recommended at least once for all adults  For pregnant patients, recommended with each pregnancy  Shingles Vaccine (Shingrix) - COST NOT COVERED BY MEDICARE PART B  2 shot series recommended in those aged 48 and above     Health Maintenance Due:  There are no preventive care reminders to display for this patient  Immunizations Due:      Topic Date Due    DTaP,Tdap,and Td Vaccines (1 - Tdap) 08/13/2012    Influenza Vaccine (1) 09/01/2021     Advance Directives   What are advance directives? Advance directives are legal documents that state your wishes and plans for medical care  These plans are made ahead of time in case you lose your ability to make decisions for yourself  Advance directives can apply to any medical decision, such as the treatments you want, and if you want to donate organs  What are the types of advance directives? There are many types of advance directives, and each state has rules about how to use them  You may choose a combination of any of the following:  · Living will: This is a written record of the treatment you want  You can also choose which treatments you do not want, which to limit, and which to stop at a certain time  This includes surgery, medicine, IV fluid, and tube feedings  · Durable power of  for healthcare [facility]):   This is a written record that states who you want to make healthcare choices for you when you are unable to make them for yourself  This person, called a proxy, is usually a family member or a friend  You may choose more than 1 proxy  · Do not resuscitate (DNR) order:  A DNR order is used in case your heart stops beating or you stop breathing  It is a request not to have certain forms of treatment, such as CPR  A DNR order may be included in other types of advance directives  · Medical directive: This covers the care that you want if you are in a coma, near death, or unable to make decisions for yourself  You can list the treatments you want for each condition  Treatment may include pain medicine, surgery, blood transfusions, dialysis, IV or tube feedings, and a ventilator (breathing machine)  · Values history: This document has questions about your views, beliefs, and how you feel and think about life  This information can help others choose the care that you would choose  Why are advance directives important? An advance directive helps you control your care  Although spoken wishes may be used, it is better to have your wishes written down  Spoken wishes can be misunderstood, or not followed  Treatments may be given even if you do not want them  An advance directive may make it easier for your family to make difficult choices about your care  Fall Prevention    Fall prevention  includes ways to make your home and other areas safer  It also includes ways you can move more carefully to prevent a fall  Health conditions that cause changes in your blood pressure, vision, or muscle strength and coordination may increase your risk for falls  Medicines may also increase your risk for falls if they make you dizzy, weak, or sleepy  Fall prevention tips:   · Stand or sit up slowly  · Use assistive devices as directed  · Wear shoes that fit well and have soles that   · Wear a personal alarm  · Stay active      · Manage your medical conditions  Home Safety Tips:  · Add items to prevent falls in the bathroom  · Keep paths clear  · Install bright lights in your home  · Keep items you use often on shelves within reach  · Paint or place reflective tape on the edges of your stairs  Weight Management   Why it is important to manage your weight:  Being overweight increases your risk of health conditions such as heart disease, high blood pressure, type 2 diabetes, and certain types of cancer  It can also increase your risk for osteoarthritis, sleep apnea, and other respiratory problems  Aim for a slow, steady weight loss  Even a small amount of weight loss can lower your risk of health problems  How to lose weight safely:  A safe and healthy way to lose weight is to eat fewer calories and get regular exercise  You can lose up about 1 pound a week by decreasing the number of calories you eat by 500 calories each day  Healthy meal plan for weight management:  A healthy meal plan includes a variety of foods, contains fewer calories, and helps you stay healthy  A healthy meal plan includes the following:  · Eat whole-grain foods more often  A healthy meal plan should contain fiber  Fiber is the part of grains, fruits, and vegetables that is not broken down by your body  Whole-grain foods are healthy and provide extra fiber in your diet  Some examples of whole-grain foods are whole-wheat breads and pastas, oatmeal, brown rice, and bulgur  · Eat a variety of vegetables every day  Include dark, leafy greens such as spinach, kale, robyn greens, and mustard greens  Eat yellow and orange vegetables such as carrots, sweet potatoes, and winter squash  · Eat a variety of fruits every day  Choose fresh or canned fruit (canned in its own juice or light syrup) instead of juice  Fruit juice has very little or no fiber  · Eat low-fat dairy foods  Drink fat-free (skim) milk or 1% milk  Eat fat-free yogurt and low-fat cottage cheese  Try low-fat cheeses such as mozzarella and other reduced-fat cheeses  · Choose meat and other protein foods that are low in fat  Choose beans or other legumes such as split peas or lentils  Choose fish, skinless poultry (chicken or turkey), or lean cuts of red meat (beef or pork)  Before you cook meat or poultry, cut off any visible fat  · Use less fat and oil  Try baking foods instead of frying them  Add less fat, such as margarine, sour cream, regular salad dressing and mayonnaise to foods  Eat fewer high-fat foods  Some examples of high-fat foods include french fries, doughnuts, ice cream, and cakes  · Eat fewer sweets  Limit foods and drinks that are high in sugar  This includes candy, cookies, regular soda, and sweetened drinks  Exercise:  Exercise at least 30 minutes per day on most days of the week  Some examples of exercise include walking, biking, dancing, and swimming  You can also fit in more physical activity by taking the stairs instead of the elevator or parking farther away from stores  Ask your healthcare provider about the best exercise plan for you  © Copyright [employer] 2018 Information is for End User's use only and may not be sold, redistributed or otherwise used for commercial purposes   All illustrations and images included in CareNotes® are the copyrighted property of [employer] M [employer]  or [address] [place] [address]

## 2022-04-14 ENCOUNTER — TELEPHONE (OUTPATIENT)
Dept: CARDIOLOGY CLINIC | Facility: CLINIC | Age: 82
End: 2022-04-14

## 2022-04-14 NOTE — TELEPHONE ENCOUNTER
Patient was in sinus rhythm or sinus bradycardia throughout the study  He had occasional, relatively low burden, of PVCs    Thank you

## 2022-04-26 NOTE — TELEPHONE ENCOUNTER
Faxed 2nd request for patient's immunization records to [facility] in order to close immunization Care Gaps in [address] chart

## 2022-05-05 ENCOUNTER — TELEPHONE (OUTPATIENT)
Dept: GERIATRICS | Age: 82
End: 2022-05-05

## 2022-05-05 NOTE — TELEPHONE ENCOUNTER
2nd Attempt  Faxed request for patient's 2021 influenza immunization record/documentation to [facility]

## 2022-05-05 NOTE — TELEPHONE ENCOUNTER
Successfully faxed request for patient's 2021 influenza immunization record/documentation to [facility]

## 2022-06-13 ENCOUNTER — PREPPED CHART (OUTPATIENT)
Dept: URBAN - METROPOLITAN AREA CLINIC 6 | Facility: CLINIC | Age: 82
End: 2022-06-13

## 2022-06-16 ENCOUNTER — ESTABLISHED COMPREHENSIVE EXAM (OUTPATIENT)
Dept: URBAN - METROPOLITAN AREA CLINIC 6 | Facility: CLINIC | Age: 82
End: 2022-06-16

## 2022-06-16 DIAGNOSIS — H26.493: ICD-10-CM

## 2022-06-16 DIAGNOSIS — Z96.1: ICD-10-CM

## 2022-06-16 PROCEDURE — 92014 COMPRE OPH EXAM EST PT 1/>: CPT

## 2022-06-16 ASSESSMENT — VISUAL ACUITY
OS_GLARE: 20/50+2
OD_CC: 20/25-2
OS_CC: 20/25-2
OU_CC: J1+
OD_GLARE: 20/70-1

## 2022-06-16 ASSESSMENT — TONOMETRY
OS_IOP_MMHG: 17
OD_IOP_MMHG: 14

## 2022-07-06 ENCOUNTER — OFFICE VISIT (OUTPATIENT)
Dept: UROLOGY | Facility: CLINIC | Age: 82
End: 2022-07-06
Payer: MEDICARE

## 2022-07-06 VITALS
HEART RATE: 93 BPM | BODY MASS INDEX: 27.57 KG/M2 | OXYGEN SATURATION: 98 % | WEIGHT: 208 LBS | HEIGHT: 73 IN | DIASTOLIC BLOOD PRESSURE: 70 MMHG | SYSTOLIC BLOOD PRESSURE: 120 MMHG

## 2022-07-06 DIAGNOSIS — R21 RASH: Primary | ICD-10-CM

## 2022-07-06 DIAGNOSIS — N40.1 BENIGN PROSTATIC HYPERPLASIA WITH URINARY FREQUENCY: Primary | ICD-10-CM

## 2022-07-06 DIAGNOSIS — R35.0 BENIGN PROSTATIC HYPERPLASIA WITH URINARY FREQUENCY: Primary | ICD-10-CM

## 2022-07-06 PROCEDURE — 99213 OFFICE O/P EST LOW 20 MIN: CPT | Performed by: PHYSICIAN ASSISTANT

## 2022-07-06 RX ORDER — METRONIDAZOLE 7.5 MG/G
GEL TOPICAL 2 TIMES DAILY
Qty: 45 G | Refills: 2 | Status: SHIPPED | OUTPATIENT
Start: 2022-07-06 | End: 2022-07-19 | Stop reason: SDUPTHER

## 2022-07-06 NOTE — TELEPHONE ENCOUNTER
Nancy Warren regarding his prescription for Metrogel  Lisbeth Monroe states that over 20 years ago, his dermatologist prescribed this gel for rosacea  After his dermatologist prescribed it, his PCP in [place] who was an internist would refill it  Lisbeth Monroe is now running out of the gel and needs it refilled

## 2022-07-06 NOTE — TELEPHONE ENCOUNTER
Aria Hartley called the office back with more information regarding Gel  He states that the online pharmacy he has been using has his script for the gel, so he has been getting it from them  But it is expensive at the online pharmacy  Aria Hartley is now using [facility] (it is cheaper) for this prescription through [facility], which is why he needs a new script

## 2022-07-06 NOTE — PROGRESS NOTES
UROLOGY PROGRESS NOTE   Patient Identifiers: David Holcomb (MRN [id number])  Date of Service: 7/6/2022    Subjective:    49-year-old man history of obstructing BPH  He had a UroLift procedure last May  He has some frequency and slight urge leak  He uses 1 pad per day  Myrbetriq is not covered and he got severe dry mouth from anticholinergic medications      Reason for visit:  BPH status post UroLift follow-up    Objective:     VITALS:    Vitals:    07/06/22 1055   BP: 120/70   Pulse: 93   SpO2: 98%           LABS:  Lab Results   Component Value Date    HGB 14 9 01/08/2021    HCT 46 6 01/08/2021    WBC 5 98 01/08/2021     01/08/2021   ]    Lab Results   Component Value Date    K 4 3 01/08/2021     (H) 01/08/2021    CO2 30 01/08/2021    BUN 31 (H) 01/08/2021    CREATININE 1 24 01/08/2021    CALCIUM 9 6 01/08/2021   ]        INPATIENT MEDS:    Current Outpatient Medications:     aspirin (ECOTRIN LOW STRENGTH) 81 mg EC tablet, Take 81 mg by mouth daily  , Disp: , Rfl:     atorvastatin (LIPITOR) 20 mg tablet, TAKE 1 TABLET DAILY, Disp: 90 tablet, Rfl: 1    cholecalciferol (VITAMIN D3) 1,000 units tablet, Take 1,000 Units by mouth daily, Disp: , Rfl:     Coenzyme Q10 (CoQ10) 100 MG CAPS, Take by mouth daily , Disp: , Rfl:     cyanocobalamin (VITAMIN B-12) 100 mcg tablet, Take by mouth daily, Disp: , Rfl:     Glucosamine-Chondroit-Vit C-Mn (GLUCOSAMINE CHONDR 1500 COMPLX PO), Take by mouth daily  , Disp: , Rfl:     metroNIDAZOLE (METROGEL) 0 75 % gel, , Disp: , Rfl:     Multiple Vitamin (multivitamin) capsule, Take 1 capsule by mouth daily, Disp: , Rfl:     Omega 3 1000 MG CAPS, Take by mouth daily , Disp: , Rfl:     clotrimazole-betamethasone (LOTRISONE) 1-0 05 % cream, Apply topically 2 (two) times a day (Patient not taking: No sig reported), Disp: 30 g, Rfl: 0    docusate sodium (COLACE) 100 mg capsule, Take 1 capsule (100 mg total) by mouth 2 (two) times a day for 15 days, Disp: 30 capsule, Rfl: 0    HYDROcodone-acetaminophen (NORCO) 5-325 mg per tablet, Take 1 tablet by mouth every 6 (six) hours as needed (Patient not taking: Reported on 3/3/2022 ), Disp: , Rfl:       Physical Exam:   /70 (BP Location: Left arm, Patient Position: Sitting, Cuff Size: Standard)   Pulse 93   Ht 6' 1" (1 854 m)   Wt 94 3 kg (208 lb)   SpO2 98%   BMI 27 44 kg/m²   GEN: no acute distress    RESP: breathing comfortably with no accessory muscle use    ABD: soft, non-tender, non-distended   INCISION:    EXT: no significant peripheral edema     RADIOLOGY:   None     Assessment:   1   BPH status post UroLift     Plan:   -discussed options of management with the patient  -he prefers to remain status quo  -he will investigate whether Myrbetriq or Gemtasa could be prior authorized  -otherwise I will see him in 1 year for his annual exam

## 2022-07-14 ENCOUNTER — TELEPHONE (OUTPATIENT)
Dept: GERIATRICS | Age: 82
End: 2022-07-14

## 2022-07-14 NOTE — TELEPHONE ENCOUNTER
Spoke with Leonela Merida and he did agree to schedule 7/19 at 9:00am  He reports that [place] an [place] appointment that he would need to move, but he would very much like to see Dr Norberto Alamo before she leaves

## 2022-07-14 NOTE — TELEPHONE ENCOUNTER
Called pt and offered appt's dates and times and pt stated he cannot makes those dates  Phone call was transferred to [facility]

## 2022-07-14 NOTE — TELEPHONE ENCOUNTER
----- Message from Radha Lott LCSW sent at 7/14/2022  1:04 PM EDT -----  Regarding: Move 10/13 appt  Please contact this pt, Umu Chen, to move their appointment on 10/13 to a sooner date, per Dr Joceline Cazares - please offer 7/19 or 7/26 to be seen with Dr Tish Suárez / Dr Joceline Cazares in the morning    Pts should be scheduled on Dr Ernesto Holder schedule (she is one of our fellows)     Please let me know the outcome of this proposed schedule change     Thank you!

## 2022-07-19 ENCOUNTER — OFFICE VISIT (OUTPATIENT)
Dept: GERIATRICS | Age: 82
End: 2022-07-19
Payer: MEDICARE

## 2022-07-19 VITALS
BODY MASS INDEX: 26.75 KG/M2 | HEIGHT: 74 IN | WEIGHT: 208.4 LBS | RESPIRATION RATE: 16 BRPM | HEART RATE: 77 BPM | TEMPERATURE: 97.2 F | OXYGEN SATURATION: 98 % | DIASTOLIC BLOOD PRESSURE: 56 MMHG | SYSTOLIC BLOOD PRESSURE: 108 MMHG

## 2022-07-19 DIAGNOSIS — G47.33 OSA (OBSTRUCTIVE SLEEP APNEA): Primary | ICD-10-CM

## 2022-07-19 DIAGNOSIS — R21 RASH: ICD-10-CM

## 2022-07-19 DIAGNOSIS — I25.84 CORONARY ARTERY DISEASE DUE TO CALCIFIED CORONARY LESION: ICD-10-CM

## 2022-07-19 DIAGNOSIS — R00.1 SINUS BRADYCARDIA: ICD-10-CM

## 2022-07-19 DIAGNOSIS — H91.93 DECREASED HEARING OF BOTH EARS: ICD-10-CM

## 2022-07-19 DIAGNOSIS — I25.10 CORONARY ARTERY DISEASE DUE TO CALCIFIED CORONARY LESION: ICD-10-CM

## 2022-07-19 DIAGNOSIS — L71.9 ROSACEA: ICD-10-CM

## 2022-07-19 PROCEDURE — 1123F ACP DISCUSS/DSCN MKR DOCD: CPT | Performed by: STUDENT IN AN ORGANIZED HEALTH CARE EDUCATION/TRAINING PROGRAM

## 2022-07-19 PROCEDURE — 99215 OFFICE O/P EST HI 40 MIN: CPT | Performed by: STUDENT IN AN ORGANIZED HEALTH CARE EDUCATION/TRAINING PROGRAM

## 2022-07-19 RX ORDER — METRONIDAZOLE 7.5 MG/G
GEL TOPICAL 2 TIMES DAILY
Qty: 90 G | Refills: 2 | Status: SHIPPED | OUTPATIENT
Start: 2022-07-19

## 2022-07-19 NOTE — ASSESSMENT & PLAN NOTE
Patient never used CPAP machine, he has mild LIOR  Denies any morning headaches, no daytime sleepiness

## 2022-07-19 NOTE — PROGRESS NOTES
[facility]  [address] [facility], [address] [place] [address]    [facility]      NAME: Holly Shultz II  AGE: 78 y o  SEX: male    DATE OF ENCOUNTER: 7/19/2022    Assessment and Plan     1  LIOR (obstructive sleep apnea)  Assessment & Plan:  Patient never used CPAP machine, he has mild LIOR  Denies any morning headaches, no daytime sleepiness       2  Coronary artery disease due to calcified coronary lesion  Assessment & Plan:  S/p 4 stents, asymptomatic  -recently seen by cardiologist   On aspirin and atorvastatin  20 mg       3  Decreased hearing of both ears  Assessment & Plan:  Patient uses hearing aids       4  Rosacea  Assessment & Plan:  Well controlled with metronidazole   Follow up with Dermatology       5  Sinus bradycardia  Assessment & Plan:  Following with cardiology  HR is normal today   Continue to monitor         No orders of the defined types were placed in this encounter       - Counseling Documentation: patient was counseled regarding: diagnostic results, instructions for management, risk factor reductions and risks and benefits of treatment options    Chief Complaint     Chief Complaint   Patient presents with    Follow-up       History of Present Illness     78year-old male with history of hyperlipidemia, CAD, incontinence, bradycardia, obstructive sleep apnea and rosacea who presents today for follow-up of chronic conditions  Patient is doing very well, he is very active and engaged in community activities  He is a former smoker, quit more than 30 years ago  Denies any alcohol abuse disorder,  no recent falls, no memory issues  He continues to be independent for ADLs and I ADLs  Patient is compliant with diet, medications and medical appointments  He is only complaining of mild lower extremity edema which is chronic and has been stable for many years  He denies any other symptoms     Patient uses glasses and hearing aids, he has some dental issues and following with his dentist regarding this  Patient states he still has some urine leakage,  he had UroLift procedure done last year, he could not tolerate anticholinergics due to dry mouth and constipation  We advised him to do blood work, we will review and call him about results  The following portions of the patient's history were reviewed and updated as appropriate: allergies, current medications, past family history, past medical history, past social history, past surgical history and problem list     Review of Systems     Review of Systems   Constitutional: Negative for appetite change, fatigue and fever  HENT: Positive for dental problem  Negative for trouble swallowing  Eyes: Negative for discharge  Respiratory: Negative for shortness of breath  Cardiovascular: Positive for leg swelling  Negative for chest pain and palpitations  Gastrointestinal: Negative for abdominal distention, abdominal pain, nausea and vomiting  Genitourinary: Negative for difficulty urinating and dysuria  Musculoskeletal: Negative for arthralgias, back pain and gait problem  Neurological: Negative for dizziness, light-headedness and headaches  Psychiatric/Behavioral: Negative for agitation and sleep disturbance         Active Problem List     Patient Active Problem List   Diagnosis    Coronary artery disease due to calcified coronary lesion    Malignant ne[facility] of overlapping sites of bladder ([facility]    Pure hypercholesterolemia    Urge incontinence    Benign prostatic hyperplasia with lower urinary tract symptoms    H/O laminectomy    Other fatigue    Stage 3a chronic kidney disease ([facility] )    LIOR (obstructive sleep apnea)    PLMD (periodic limb movement disorder)    Sinus bradycardia    PVC (premature ventricular contraction)    Bigeminy    Rosacea    Paroxysmal atrial fibrillation (HCC)    Status post radiofrequency ablation (RFA) operation for arrhythmia  Decreased hearing of both ears    OAB (overactive bladder)    Intertrigo    Abnormal laboratory test    Scalp lesion    Toenail fungus    Elevated BP without diagnosis of hypertension    Acute midline thoracic back pain    Rib pain on left side    Acute pain of left shoulder    Fall    Closed fracture of one rib of left side with routine healing    Degenerative spondylolisthesis    Medicare annual wellness visit, subsequent       Objective     /56 (BP Location: Left arm, Patient Position: Sitting, Cuff Size: Adult)   Pulse 77   Temp (!) 97 2 °F (36 2 °C) (Temporal)   Resp 16   Ht 6' 2" (1 88 m)   Wt 94 5 kg (208 lb 6 4 oz)   SpO2 98%   BMI 26 76 kg/m²     Physical Exam  Vitals reviewed  Constitutional:       General: He is not in acute distress  Appearance: Normal appearance  He is normal weight  He is not ill-appearing, toxic-appearing or diaphoretic  HENT:      Head: Normocephalic and atraumatic  Nose: Nose normal       Mouth/Throat:      Mouth: Mucous membranes are moist    Eyes:      General: No scleral icterus  Right eye: No discharge  Left eye: No discharge  Conjunctiva/sclera: Conjunctivae normal    Cardiovascular:      Rate and Rhythm: Normal rate  Heart sounds: Normal heart sounds  Pulmonary:      Breath sounds: Normal breath sounds  Abdominal:      General: There is no distension  Palpations: Abdomen is soft  Tenderness: There is no guarding  Skin:     General: Skin is warm  Neurological:      General: No focal deficit present  Mental Status: He is alert and oriented to person, place, and time     Psychiatric:         Mood and Affect: Mood normal          Behavior: Behavior normal          Pertinent Laboratory/Diagnostic Studies:  Chemistry Profile:   Lab Results   Component Value Date/Time    K 4 3 01/08/2021 09:22 AM     (H) 01/08/2021 09:22 AM    CO2 30 01/08/2021 09:22 AM    BUN 31 (H) 01/08/2021 09:22 AM CREATININE 1 24 01/08/2021 09:22 AM    GLUF 77 01/08/2021 09:22 AM    CALCIUM 9 6 01/08/2021 09:22 AM    CORRECTEDCA 10 1 01/08/2021 09:22 AM    AST 18 01/08/2021 09:22 AM    ALT 26 01/08/2021 09:22 AM    ALKPHOS 83 01/08/2021 09:22 AM    EGFR 54 01/08/2021 09:22 AM         Current Medications     Current Outpatient Medications:     aspirin (ECOTRIN LOW STRENGTH) 81 mg EC tablet, Take 81 mg by mouth daily  , Disp: , Rfl:     atorvastatin (LIPITOR) 20 mg tablet, TAKE 1 TABLET DAILY, Disp: 90 tablet, Rfl: 1    cholecalciferol (VITAMIN D3) 1,000 units tablet, Take 1,000 Units by mouth daily, Disp: , Rfl:     Coenzyme Q10 (CoQ10) 100 MG CAPS, Take by mouth daily , Disp: , Rfl:     cyanocobalamin (VITAMIN B-12) 100 mcg tablet, Take by mouth daily, Disp: , Rfl:     Glucosamine-Chondroit-Vit C-Mn (GLUCOSAMINE CHONDR 1500 COMPLX PO), Take by mouth daily  , Disp: , Rfl:     metroNIDAZOLE (METROGEL) 0 75 % gel, Apply topically 2 (two) times a day, Disp: 45 g, Rfl: 2    Multiple Vitamin (multivitamin) capsule, Take 1 capsule by mouth daily, Disp: , Rfl:     Omega 3 1000 MG CAPS, Take by mouth daily , Disp: , Rfl:     clotrimazole-betamethasone (LOTRISONE) 1-0 05 % cream, Apply topically 2 (two) times a day (Patient not taking: No sig reported), Disp: 30 g, Rfl: 0    docusate sodium (COLACE) 100 mg capsule, Take 1 capsule (100 mg total) by mouth 2 (two) times a day for 15 days, Disp: 30 capsule, Rfl: 0    HYDROcodone-acetaminophen (NORCO) 5-325 mg per tablet, Take 1 tablet by mouth every 6 (six) hours as needed (Patient not taking: Reported on 3/3/2022 ), Disp: , Rfl:     Health Maintenance     Health Maintenance   Topic Date Due    BMI: Followup Plan  Never done    Falls: Plan of Care  Never done    COVID-19 Vaccine (4 - Booster for Moderna series) 02/27/2022    Influenza Vaccine (1) 09/01/2022    Fall Risk  04/07/2023    Depression Screening  04/07/2023    Medicare Annual Wellness Visit (AWV)  04/07/2023  BMI: Adult  07/19/2023    Pneumococcal Vaccine: 65+ Years  Completed    HIB Vaccine  Aged Out    Hepatitis B Vaccine  Aged Out    IPV Vaccine  Aged Out    Hepatitis A Vaccine  Aged Out    Meningococcal ACWY Vaccine  Aged Out    HPV Vaccine  Aged Out     Immunization History   Administered Date(s) Administered    COVID-19 MODERNA VACC 0 5 ML IM 01/26/2021, 02/24/2021, 10/27/2021    DT (pediatric) 08/12/2012    INFLUENZA 10/02/2015, 10/17/2016, 09/01/2017, 09/30/2017, 09/15/2018, 09/26/2018, 08/28/2020    Influenza Split 01/01/2011, 01/01/2012, 09/27/2013    Influenza Split High Dose Preservative Free IM 09/26/2018    Pneumococcal Conjugate 13-Valent 03/03/2015    Pneumococcal Polysaccharide PPV23 09/12/2016    Zoster 11/30/2012    Zoster Vaccine Recombinant 07/21/2019, 10/08/2019

## 2022-07-19 NOTE — PATIENT INSTRUCTIONS
Leg Edema   WHAT YOU NEED TO KNOW:   Leg edema is swelling caused by fluid buildup  Your legs may swell if you sit or stand for long periods of time, are pregnant, or are injured  Swelling may also occur if you have heart failure or circulation problems  This means that your heart does not pump blood through your body as it should  DISCHARGE INSTRUCTIONS:   Call your local emergency number (911 in the [address] [facility]) for any of the following: You cannot walk  You have chest pain or trouble breathing that is worse when you lie down  You suddenly feel lightheaded and have trouble breathing  You have new and sudden chest pain  You may have more pain when you take deep breaths or cough  You cough up blood  Return to the emergency department if:   You feel faint or confused  Your skin turns blue or gray  Your leg feels warm, tender, and painful  It may be swollen and red  Call your doctor if:   You have a fever or feel more tired than usual     The veins in your legs look larger than usual  They may look full or bulging  Your legs itch or feel heavy  You have red or white areas or sores on your legs  The skin may also appear dimpled or have indentations  You are gaining weight  You have trouble moving your ankles  The swelling does not go away, or other parts of your body swell  You have questions or concerns about your condition or care  Self-care:   Elevate your legs  Raise your legs above the level of your heart as often as you can  This will help decrease swelling and pain  Prop your legs on pillows or blankets to keep them elevated comfortably  Wear pressure stockings, if directed  These tight stockings put pressure on your legs to promote blood flow and prevent blood clots  Put them on before you get out of bed  Wear the stockings during the day  Do not wear them while you sleep  Stay active  Do not stand or sit for long periods of time   Ask your healthcare provider about the best exercise plan for you  Eat healthy foods  Healthy foods include fruits, vegetables, whole-grain breads, low-fat dairy products, beans, lean meats, and fish  Ask if you need to be on a special diet  Limit sodium (salt)  Salt will make your body hold even more fluid  Your healthcare provider will tell you how many milligrams (mg) of salt you can have each day  Follow up with your doctor as directed:  Write down your questions so you remember to ask them during your visits  © Copyright [employer] 2022 Information is for End User's use only and may not be sold, redistributed or otherwise used for commercial purposes  All illustrations and images included in CareNotes® are the copyrighted property of [employer]  or [facility] Awais Arnold   The above information is an  only  It is not intended as medical advice for individual conditions or treatments  Talk to your doctor, nurse or pharmacist before following any medical regimen to see if it is safe and effective for you

## 2022-07-21 LAB — HBA1C MFR BLD HPLC: 5.7 %

## 2022-07-26 ENCOUNTER — TELEPHONE (OUTPATIENT)
Dept: GERIATRICS | Age: 82
End: 2022-07-26

## 2022-07-26 NOTE — TELEPHONE ENCOUNTER
----- Message from Leslye Hahn MD sent at 7/26/2022  4:25 AM EDT -----  Please call the patient about his lab results    1)CMP:  Normal electrolytes, stable liver enzymes, kidney function  2)HbA1C: 5 7(previously 5 6)  Borderline lower limit of pre diabetes  Recommend a low sugar, heart healthy diet    No treatment recommended at this time  3) Lipid panel:  Well controlled  4) Thyroid function normal  5)CBC: no anemia

## 2022-07-27 ENCOUNTER — TELEPHONE (OUTPATIENT)
Dept: GERIATRICS | Age: 82
End: 2022-07-27

## 2022-07-27 NOTE — TELEPHONE ENCOUNTER
----- Message from Juan Alberto Ortiz MD sent at 7/26/2022  4:25 AM EDT -----  Please call the patient about his lab results    1)CMP:  Normal electrolytes, stable liver enzymes, kidney function  2)HbA1C: 5 7(previously 5 6)  Borderline lower limit of pre diabetes  Recommend a low sugar, heart healthy diet    No treatment recommended at this time  3) Lipid panel:  Well controlled  4) Thyroid function normal  5)CBC: no anemia

## 2022-08-01 ENCOUNTER — TELEPHONE (OUTPATIENT)
Dept: GERIATRICS | Age: 82
End: 2022-08-01

## 2022-08-01 NOTE — TELEPHONE ENCOUNTER
Patient, Hammad Dunn "Cate De La Cruz" Rohan Shaw ([phone]) called to say he received a call advising him that he is "pre-diabetic" and that he should follow a low sugar diet  Patient has questions  He would like to know how he got here, and what specifically should his diet changes include?

## 2022-08-02 NOTE — TELEPHONE ENCOUNTER
Please let the patient know his HbA1c increased ONLY from 5 6 to 5 7, which is negligible  An HbA1c of 5 7-6 5 is considered the prediabetic range which is why he was told this  This is acceptable per the  American geriatric society guidelines for an 78-year-old male and does not require any treatment  I would simply recommend he continue his healthy lifestyle changes and follow-up with his new PCP for further monitoring in the future  Looking at his overall carbohydrate intake, high sugar foods, refined foods, alcohol,are some aspects of diet to decrease their intake  Although overall, his HbA1C is not very concerning

## 2022-08-03 NOTE — TELEPHONE ENCOUNTER
Called patient and relayed provider message with recommendation/notation  Patient expressed not having chosen a new primary care provider; this MA encouraged patient to choose one and to schedule appointment to establish care

## 2022-09-09 ENCOUNTER — TELEPHONE (OUTPATIENT)
Dept: GERIATRICS | Age: 82
End: 2022-09-09

## 2022-09-09 NOTE — TELEPHONE ENCOUNTER
Patient called and requested his records transferred to a new PCP  Medical records form emailed to patient [email or web address]

## 2022-10-11 PROBLEM — Z00.00 MEDICARE ANNUAL WELLNESS VISIT, SUBSEQUENT: Status: RESOLVED | Noted: 2022-04-07 | Resolved: 2022-10-11

## 2023-03-08 ENCOUNTER — PROCEDURE VISIT (OUTPATIENT)
Dept: CARDIOLOGY CLINIC | Facility: CLINIC | Age: 83
End: 2023-03-08

## 2023-03-08 ENCOUNTER — OFFICE VISIT (OUTPATIENT)
Dept: CARDIOLOGY CLINIC | Facility: CLINIC | Age: 83
End: 2023-03-08

## 2023-03-08 VITALS
HEART RATE: 64 BPM | BODY MASS INDEX: 26.58 KG/M2 | DIASTOLIC BLOOD PRESSURE: 60 MMHG | WEIGHT: 207 LBS | SYSTOLIC BLOOD PRESSURE: 130 MMHG

## 2023-03-08 DIAGNOSIS — E78.00 PURE HYPERCHOLESTEROLEMIA: ICD-10-CM

## 2023-03-08 DIAGNOSIS — Z86.79 STATUS POST RADIOFREQUENCY ABLATION (RFA) OPERATION FOR ARRHYTHMIA: ICD-10-CM

## 2023-03-08 DIAGNOSIS — I48.91 ATRIAL FIBRILLATION, UNSPECIFIED TYPE (HCC): Primary | ICD-10-CM

## 2023-03-08 DIAGNOSIS — I25.10 CORONARY ARTERY DISEASE DUE TO CALCIFIED CORONARY LESION: Primary | ICD-10-CM

## 2023-03-08 DIAGNOSIS — R00.1 SINUS BRADYCARDIA: ICD-10-CM

## 2023-03-08 DIAGNOSIS — I25.84 CORONARY ARTERY DISEASE DUE TO CALCIFIED CORONARY LESION: Primary | ICD-10-CM

## 2023-03-08 DIAGNOSIS — Z98.890 STATUS POST RADIOFREQUENCY ABLATION (RFA) OPERATION FOR ARRHYTHMIA: ICD-10-CM

## 2023-03-08 DIAGNOSIS — I48.0 PAROXYSMAL ATRIAL FIBRILLATION (HCC): ICD-10-CM

## 2023-03-08 DIAGNOSIS — I49.3 PVC (PREMATURE VENTRICULAR CONTRACTION): ICD-10-CM

## 2023-03-08 DIAGNOSIS — I49.8 BIGEMINY: ICD-10-CM

## 2023-03-08 DIAGNOSIS — I49.5 SSS (SICK SINUS SYNDROME) (HCC): ICD-10-CM

## 2023-03-08 NOTE — PROGRESS NOTES
Cardiology Follow Up    [facility]  1940  Derick Swann 74 CATH LAB  [address]  [address]  [phone]    1  Coronary artery disease due to calcified coronary lesion  Holter monitor    POCT ECG      2  Sinus bradycardia  Holter monitor      3  PVC (premature ventricular contraction)        4  Bigeminy        5  Paroxysmal atrial fibrillation (HCC)  Holter monitor      6  Pure hypercholesterolemia        7  Status post radiofrequency ablation (RFA) operation for arrhythmia  Holter monitor      8  SSS (sick sinus syndrome) (HCC)  Holter monitor          Interval History: Cardiology follow-up  Patient continues to do well from a cardiac review, he walks regularly an hour at a time without any exertional symptoms, denies any syncope or presyncope  No orthopnea no PND  Denies any bleeding issues on chronic aspirin therapy  Compliant with low-cholesterol diet  Lipids last checked total cholesterol 120 HDL 43 LDL 67, he is on medium intense statin therapy  Denies any focal neurological deficits or emesis fugax  He was admitted 2 months ago with pneumonia  He did recover well      Patient Active Problem List   Diagnosis   • Coronary artery disease due to calcified coronary lesion   • Malignant neoplasm of overlapping sites of bladder ([facility] [address] )   • Pure hypercholesterolemia   • Urge incontinence   • Benign prostatic hyperplasia with lower urinary tract symptoms   • H/O laminectomy   • Other fatigue   • Stage 3a chronic kidney disease (HCC)   • LIOR (obstructive sleep apnea)   • PLMD (periodic limb movement disorder)   • Sinus bradycardia   • PVC (premature ventricular contraction)   • Bigeminy   • Rosacea   • Paroxysmal atrial fibrillation (HCC)   • Status post radiofrequency ablation (RFA) operation for arrhythmia   • Decreased hearing of both ears   • OAB (overactive bladder)   • Intertrigo   • Abnormal laboratory test   • Scalp lesion   • Toenail fungus   • Elevated BP without diagnosis of hypertension   • Acute midline thoracic back pain   • Rib pain on left side   • Acute pain of left shoulder   • Fall   • Closed fracture of one rib of left side with routine healing   • Degenerative spondylolisthesis   • SSS (sick sinus syndrome) (HCC)     Past Medical History:   Diagnosis Date   • Bladder cancer ([facility] [address] )    • Irregular heart beat     Afib     Social History     Socioeconomic History   • Marital status:       Spouse name: Not on file   • Number of children: Not on file   • Years of education: Not on file   • Highest education level: Not on file   Occupational History   • Not on file   Tobacco Use   • Smoking status: Former     Types: Cigarettes     Start date: 10/8/1961     Quit date: 10/8/1977     Years since quittin 4   • Smokeless tobacco: Never   Vaping Use   • Vaping Use: Never used   Substance and Sexual Activity   • Alcohol use: Yes     Comment: occasional wine   • Drug use: Never   • Sexual activity: Not Currently   Other Topics Concern   • Not on file   Social History Narrative   • Not on file     Social Determinants of Health     Financial Resource Strain: Not on file   Food Insecurity: Not on file   Transportation Needs: Not on file   Physical Activity: Not on file   Stress: Not on file   Social Connections: Not on file   Intimate Partner Violence: Not on file   Housing Stability: Not on file      Family History   Problem Relation Age of Onset   • Pancreatic cancer Mother    • Liver cancer Father    • Cancer Brother      Past Surgical History:   Procedure Laterality Date   • CARDIAC SURGERY      Afib ablation   • CATARACT EXTRACTION, BILATERAL Bilateral [id number]   • [place] CYSTO INSERTION TRANSPROSTATIC IMPLANT SINGLE N/A 2021    Procedure: CYSTOSCOPY WITH INSERTION Mort Shorts;  Surgeon: Shu Stack MD;  Location: [facility]  MAIN OR;  Service: Urology       Current Outpatient Medications:   •  aspirin (ECOTRIN LOW STRENGTH) 81 mg EC tablet, Take 81 mg by mouth daily  , Disp: , Rfl:   •  atorvastatin (LIPITOR) 20 mg tablet, TAKE 1 TABLET DAILY, Disp: 90 tablet, Rfl: 1  •  cholecalciferol (VITAMIN D3) 1,000 units tablet, Take 1,000 Units by mouth daily, Disp: , Rfl:   •  Coenzyme Q10 (CoQ10) 100 MG CAPS, Take by mouth daily , Disp: , Rfl:   •  cyanocobalamin (VITAMIN B-12) 100 mcg tablet, Take by mouth daily, Disp: , Rfl:   •  Glucosamine-Chondroit-Vit C-Mn (GLUCOSAMINE CHONDR 1500 COMPLX PO), Take by mouth daily  , Disp: , Rfl:   •  metroNIDAZOLE (METROGEL) 0 75 % gel, Apply topically 2 (two) times a day, Disp: 90 g, Rfl: 2  •  Multiple Vitamin (multivitamin) capsule, Take 1 capsule by mouth daily, Disp: , Rfl:   •  Omega 3 1000 MG CAPS, Take by mouth daily , Disp: , Rfl:   Allergies   Allergen Reactions   • Sulfa Antibiotics Hives       Labs:  No visits with results within 6 Month(s) from this visit  Latest known visit with results is:   Orders Only on 07/21/2022   Component Date Value   • Hemoglobin A1C 07/21/2022 5 7      Imaging: No results found  Review of Systems:  Review of Systems   Constitutional: Negative for activity change, fatigue and unexpected weight change  HENT: Negative for hearing loss and nosebleeds  Eyes: Negative for visual disturbance  Respiratory: Negative for apnea, shortness of breath, wheezing and stridor  Cardiovascular: Negative for chest pain, palpitations and leg swelling  Gastrointestinal: Negative for abdominal pain, anal bleeding and blood in stool  Endocrine: Negative for cold intolerance  Genitourinary: Positive for frequency  Negative for hematuria  Musculoskeletal: Negative for arthralgias, gait problem and myalgias  Skin: Negative for pallor and rash  Allergic/Immunologic: Negative for immunocompromised state  Neurological: Negative for dizziness, tremors, syncope, facial asymmetry, speech difficulty and weakness  Hematological: Does not bruise/bleed easily  Psychiatric/Behavioral: Positive for sleep disturbance  Physical Exam:  Physical Exam  Vitals reviewed  Constitutional:       General: He is not in acute distress  Appearance: Normal appearance  He is normal weight  He is not ill-appearing, toxic-appearing or diaphoretic  Eyes:      General: No scleral icterus  Neck:      Vascular: No carotid bruit  Cardiovascular:      Rate and Rhythm: Normal rate and regular rhythm  Pulses: Normal pulses  Heart sounds: Normal heart sounds  No murmur heard  No friction rub  No gallop  Pulmonary:      Effort: Pulmonary effort is normal  No respiratory distress  Breath sounds: Normal breath sounds  No stridor  No wheezing, rhonchi or rales  Musculoskeletal:      Right lower leg: No edema  Left lower leg: No edema  Skin:     General: Skin is warm and dry  Capillary Refill: Capillary refill takes less than 2 seconds  Coloration: Skin is not jaundiced or pale  Findings: No bruising or erythema  Neurological:      Mental Status: He is alert and oriented to person, place, and time  Psychiatric:         Mood and Affect: Mood normal          Discussion/Summary:   Coronary artery disease, PTCA/drug stent of the RCA 3 stents plus stage revascularization of the LAD in 2013  Echocardiogram 2021 revealed normal left ventricular systolic function with mild aortic and tricuspid insufficiency, upper limits of normal pulmonary pressures suggested by Doppler criteria  Normal diastolic parameters  Stress test at that time he did 7 minutes of Shayan protocol, he did achieve target heart rate with adequate chronotropic competence at achieving 95% maximum predicted heart rate for age, there was no EKG criteria for ischemia or symptoms  Lipids are well control  Sick sinus syndrome, ventricular ectopy    Holter monitor last year revealed resting sinus bradycardia average of 47 range from 33-92, over 5000 PVCs including trigeminy but no sustained tachyarrhythmias or ventricular tachycardia  He is not on beta-blocker because of that despite his CAD  Borderline indication for pacemaker  He has remained clinically electrographically in sinus rhythm, does have history of radiofrequency ablation cryoablation for atrial fibrillation/flutter 2014  We will repeat a Holter monitor  This note was completed in part utilizing [employer] direct voice recognition software  Grammatical errors, random word insertion, spelling mistakes, and incomplete sentences may be an occasional consequence of the system secondary to software limitations, ambient noise and hardware issues  At the time of dictation, efforts were made to edit, clarify and /or correct errors  Please read the chart carefully and recognize, using context, where substitutions have occurred  If you have any questions or concerns about the context, text or information contained within the body of this dictation, please contact myself, the provider, for further clarification

## 2023-03-16 ENCOUNTER — HOSPITAL ENCOUNTER (OUTPATIENT)
Dept: NON INVASIVE DIAGNOSTICS | Facility: HOSPITAL | Age: 83
Discharge: HOME | End: 2023-03-16

## 2023-03-16 DIAGNOSIS — I48.91 A-FIB (HCC): ICD-10-CM

## 2023-06-15 ENCOUNTER — ESTABLISHED COMPREHENSIVE EXAM (OUTPATIENT)
Dept: URBAN - METROPOLITAN AREA CLINIC 6 | Facility: CLINIC | Age: 83
End: 2023-06-15

## 2023-06-15 DIAGNOSIS — H26.493: ICD-10-CM

## 2023-06-15 DIAGNOSIS — Z96.1: ICD-10-CM

## 2023-06-15 PROCEDURE — 92014 COMPRE OPH EXAM EST PT 1/>: CPT

## 2023-06-15 ASSESSMENT — VISUAL ACUITY
OD_CC: 20/25-2
OS_CC: 20/20-2

## 2023-06-15 ASSESSMENT — TONOMETRY
OS_IOP_MMHG: 11
OD_IOP_MMHG: 11

## 2023-07-07 ENCOUNTER — OFFICE VISIT (OUTPATIENT)
Dept: UROLOGY | Facility: CLINIC | Age: 83
End: 2023-07-07
Payer: MEDICARE

## 2023-07-07 VITALS
SYSTOLIC BLOOD PRESSURE: 140 MMHG | HEIGHT: 74 IN | DIASTOLIC BLOOD PRESSURE: 80 MMHG | OXYGEN SATURATION: 97 % | HEART RATE: 57 BPM | WEIGHT: 208 LBS | RESPIRATION RATE: 18 BRPM | BODY MASS INDEX: 26.69 KG/M2

## 2023-07-07 DIAGNOSIS — N40.1 BENIGN PROSTATIC HYPERPLASIA WITH URINARY FREQUENCY: Primary | ICD-10-CM

## 2023-07-07 DIAGNOSIS — R35.0 BENIGN PROSTATIC HYPERPLASIA WITH URINARY FREQUENCY: Primary | ICD-10-CM

## 2023-07-07 PROCEDURE — 99213 OFFICE O/P EST LOW 20 MIN: CPT | Performed by: PHYSICIAN ASSISTANT

## 2023-07-07 NOTE — PROGRESS NOTES
UROLOGY PROGRESS NOTE   Patient Identifiers: Claudia Sinha (MRN [id number])  Date of Service: 7/7/2023    Subjective:   53-year-old man history of BPH. He had a UroLift procedure last year. Reports occasional urgency but overall good flow and no complaints.     Reason for visit: BPH/UroLift follow-up    Objective:     VITALS:    Vitals:    07/07/23 1025   Resp: 18     AUA SYMPTOM SCORE    Flowsheet Row Most Recent Value   AUA SYMPTOM SCORE    How often have you had a sensation of not emptying your bladder completely after you finished urinating? 1 (P)     How often have you had to urinate again less than two hours after you finished urinating? 1 (P)     How often have you found you stopped and started again several times when you urinate? 0 (P)     How often have you found it difficult to postpone urination? 1 (P)     How often have you had a weak urinary stream? 1 (P)     How often have you had to push or strain to begin urination? 0 (P)     How many times did you most typically get up to urinate from the time you went to bed at night until the time you got up in the morning? 5 (P)     Quality of Life: If you were to spend the rest of your life with your urinary condition just the way it is now, how would you feel about that? 2 (P)     AUA SYMPTOM SCORE 9 (P)             LABS:  Lab Results   Component Value Date    HGB 14.9 01/08/2021    HCT 46.6 01/08/2021    WBC 5.98 01/08/2021     01/08/2021   ]    Lab Results   Component Value Date    K 4.3 01/08/2021     (H) 01/08/2021    CO2 30 01/08/2021    BUN 31 (H) 01/08/2021    CREATININE 1.24 01/08/2021    CALCIUM 9.6 01/08/2021   ]        INPATIENT MEDS:    Current Outpatient Medications:   •  aspirin (ECOTRIN LOW STRENGTH) 81 mg EC tablet, Take 81 mg by mouth daily  , Disp: , Rfl:   •  atorvastatin (LIPITOR) 20 mg tablet, TAKE 1 TABLET DAILY, Disp: 90 tablet, Rfl: 1  •  cholecalciferol (VITAMIN D3) 1,000 units tablet, Take 1,000 Units by mouth daily, Disp: , Rfl:   •  Coenzyme Q10 (CoQ10) 100 MG CAPS, Take by mouth daily , Disp: , Rfl:   •  cyanocobalamin (VITAMIN B-12) 100 mcg tablet, Take by mouth daily, Disp: , Rfl:   •  Glucosamine-Chondroit-Vit C-Mn (GLUCOSAMINE CHONDR 1500 COMPLX PO), Take by mouth daily  , Disp: , Rfl:   •  metroNIDAZOLE (METROGEL) 0.75 % gel, Apply topically 2 (two) times a day, Disp: 90 g, Rfl: 2  •  Multiple Vitamin (multivitamin) capsule, Take 1 capsule by mouth daily, Disp: , Rfl:   •  Omega 3 1000 MG CAPS, Take by mouth daily , Disp: , Rfl:       Physical Exam:   Resp 18   Ht 6' 2" (1.88 m)   Wt 94.3 kg (208 lb)   BMI 26.71 kg/m²   GEN: no acute distress    RESP: breathing comfortably with no accessory muscle use    ABD: soft, non-tender, non-distended   INCISION:    EXT: no significant peripheral edema   (Male): Penis circumcised, phallus normal, meatus patent. Testicles descended into scrotum bilaterally without masses nor tenderness. No inguinal hernias bilaterally. YULIET: Prostate is enlarged at 45 grams. The prostate is not boggy. The prostate is not tender. No nodules noted    RADIOLOGY:   None    Assessment:   #1.   BPH/UroLift follow-up    Plan:   -Follow-up 1 year for his annual exam  -  -  -

## 2024-03-12 ENCOUNTER — OFFICE VISIT (OUTPATIENT)
Dept: CARDIOLOGY CLINIC | Facility: CLINIC | Age: 84
End: 2024-03-12
Payer: MEDICARE

## 2024-03-12 VITALS
HEIGHT: 74 IN | DIASTOLIC BLOOD PRESSURE: 56 MMHG | SYSTOLIC BLOOD PRESSURE: 124 MMHG | HEART RATE: 56 BPM | WEIGHT: 212.4 LBS | BODY MASS INDEX: 27.26 KG/M2

## 2024-03-12 DIAGNOSIS — I25.10 CORONARY ARTERY DISEASE DUE TO CALCIFIED CORONARY LESION: ICD-10-CM

## 2024-03-12 DIAGNOSIS — I49.8 BIGEMINY: ICD-10-CM

## 2024-03-12 DIAGNOSIS — I25.84 CORONARY ARTERY DISEASE DUE TO CALCIFIED CORONARY LESION: ICD-10-CM

## 2024-03-12 DIAGNOSIS — I48.0 PAROXYSMAL ATRIAL FIBRILLATION (HCC): ICD-10-CM

## 2024-03-12 DIAGNOSIS — Z98.890 STATUS POST RADIOFREQUENCY ABLATION (RFA) OPERATION FOR ARRHYTHMIA: ICD-10-CM

## 2024-03-12 DIAGNOSIS — E78.00 PURE HYPERCHOLESTEROLEMIA: ICD-10-CM

## 2024-03-12 DIAGNOSIS — R00.1 SINUS BRADYCARDIA: ICD-10-CM

## 2024-03-12 DIAGNOSIS — Z86.79 STATUS POST RADIOFREQUENCY ABLATION (RFA) OPERATION FOR ARRHYTHMIA: ICD-10-CM

## 2024-03-12 DIAGNOSIS — I49.5 SSS (SICK SINUS SYNDROME) (HCC): Primary | ICD-10-CM

## 2024-03-12 DIAGNOSIS — I49.3 PVC (PREMATURE VENTRICULAR CONTRACTION): ICD-10-CM

## 2024-03-12 PROCEDURE — 93000 ELECTROCARDIOGRAM COMPLETE: CPT | Performed by: INTERNAL MEDICINE

## 2024-03-12 PROCEDURE — 99214 OFFICE O/P EST MOD 30 MIN: CPT | Performed by: INTERNAL MEDICINE

## 2024-03-12 NOTE — PROGRESS NOTES
Cardiology Follow Up    Kelvin Parson II  1940  [id number]  [facility] CARDIAC CATH LAB  [address] [facility] [address]  [phone]    1. SSS (sick sinus syndrome) (HCC)  POCT ECG      2. Coronary artery disease due to calcified coronary lesion        3. Sinus bradycardia        4. PVC (premature ventricular contraction)        5. Paroxysmal atrial fibrillation (HCC)        6. Bigeminy        7. Pure hypercholesterolemia        8. Status post radiofrequency ablation (RFA) operation for arrhythmia            Interval History: Cardiology follow-up.  Patient continues to do well from the cardiac point of view, denies any chest pain or dyspnea, he walks regularly an hour every day, no exertional symptoms.  Denies any syncope or presyncope.  Denies any focal neurological deficits or amaurosis fugax.  No bleeding issues on chronic aspirin therapy.  States been compliant with low-cholesterol diet, lipids recently checked total cholesterol 100, HDL 35, LDL 53, excellent control on medium intensity statin therapy.  Ambulatory with a low sodium diet, he is doing ambulatory blood pressures, there appears to be unremarkable.  Consistently in the 120s over 70.  Will occasionally hide systolic    Patient Active Problem List   Diagnosis    Coronary artery disease due to calcified coronary lesion    Malignant neoplasm of overlapping sites of bladder (HCC)    Pure hypercholesterolemia    Urge incontinence    Benign prostatic hyperplasia with lower urinary tract symptoms    H/O laminectomy    Other fatigue    Stage 3a chronic kidney disease (HCC)    LIOR (obstructive sleep apnea)    PLMD (periodic limb movement disorder)    Sinus bradycardia    PVC (premature ventricular contraction)    Bigeminy    Rosacea    Paroxysmal atrial fibrillation (HCC)    Status post radiofrequency ablation (RFA) operation for arrhythmia    Decreased hearing of both  ears    OAB (overactive bladder)    Intertrigo    Abnormal laboratory test    Scalp lesion    Toenail fungus    Elevated BP without diagnosis of hypertension    Acute midline thoracic back pain    Rib pain on left side    Acute pain of left shoulder    Fall    Closed fracture of one rib of left side with routine healing    Degenerative spondylolisthesis    SSS (sick sinus syndrome) (HCC)     Past Medical History:   Diagnosis Date    Bladder cancer (HCC)     Irregular heart beat     Afib     Social History     Socioeconomic History    Marital status:      Spouse name: Not on file    Number of children: Not on file    Years of education: Not on file    Highest education level: Not on file   Occupational History    Not on file   Tobacco Use    Smoking status: Former     Current packs/day: 0.00     Types: Cigarettes     Start date: 10/8/1961     Quit date: 10/8/1977     Years since quittin.4    Smokeless tobacco: Never   Vaping Use    Vaping status: Never Used   Substance and Sexual Activity    Alcohol use: Yes     Comment: occasional wine    Drug use: Never    Sexual activity: Not Currently   Other Topics Concern    Not on file   Social History Narrative    Not on file     Social Determinants of Health     Financial Resource Strain: Low Risk  (2023)    Received from [facility]    Overall Financial Resource Strain (CARDIA)     Difficulty of Paying Living Expenses: Not very hard   Food Insecurity: No Food Insecurity (2023)    Received from [facility]    Hunger Vital Sign     Worried About Running Out of Food in the Last Year: Never true     Ran Out of Food in the Last Year: Never true   Transportation Needs: No Transportation Needs (2023)    Received from [facility]    PRAPARE - Transportation     Lack of Transportation (Medical): No     Lack of Transportation (Non-Medical): No   Physical Activity: Sufficiently Active (10/8/2020)    Exercise  Vital Sign     Days of Exercise per Week: 7 days     Minutes of Exercise per Session: 60 min   Stress: Not on file   Social Connections: Not on file   Intimate Partner Violence: Not At Risk (1/24/2023)    Received from [facility]    Humiliation, Afraid, Rape, and Kick questionnaire     Fear of Current or Ex-Partner: No     Emotionally Abused: No     Physically Abused: No     Sexually Abused: No   Housing Stability: Low Risk  (1/24/2023)    Received from [facility]    Housing Stability Vital Sign     Unable to Pay for Housing in the Last Year: No     Number of Places Lived in the Last Year: 1     Unstable Housing in the Last Year: No      Family History   Problem Relation Age of Onset    Pancreatic cancer Mother     Liver cancer Father     Cancer Brother      Past Surgical History:   Procedure Laterality Date    CARDIAC SURGERY      Afib ablation    CATARACT EXTRACTION, BILATERAL Bilateral [id number]    [place] CYSTO INSERTION TRANSPROSTATIC IMPLANT SINGLE N/A 5/28/2021    Procedure: CYSTOSCOPY WITH INSERTION UROLIFT;  Surgeon: Jeffery Law MD;  Location: [facility]  MAIN OR;  Service: Urology       Current Outpatient Medications:     aspirin (ECOTRIN LOW STRENGTH) 81 mg EC tablet, Take 81 mg by mouth daily  , Disp: , Rfl:     atorvastatin (LIPITOR) 20 mg tablet, TAKE 1 TABLET DAILY, Disp: 90 tablet, Rfl: 1    cholecalciferol (VITAMIN D3) 1,000 units tablet, Take 1,000 Units by mouth daily, Disp: , Rfl:     Coenzyme Q10 (CoQ10) 100 MG CAPS, Take by mouth daily , Disp: , Rfl:     cyanocobalamin (VITAMIN B-12) 100 mcg tablet, Take by mouth daily, Disp: , Rfl:     Glucosamine-Chondroit-Vit C-Mn (GLUCOSAMINE CHONDR 1500 COMPLX PO), Take by mouth daily  , Disp: , Rfl:     metroNIDAZOLE (METROGEL) 0.75 % gel, Apply topically 2 (two) times a day, Disp: 90 g, Rfl: 2    Multiple Vitamin (multivitamin) capsule, Take 1 capsule by mouth daily, Disp: , Rfl:     Omega 3 1000 MG CAPS, Take by mouth  daily , Disp: , Rfl:   Allergies   Allergen Reactions    Sulfa Antibiotics Hives       Labs:  No visits with results within 6 Month(s) from this visit.   Latest known visit with results is:   Orders Only on 07/21/2022   Component Date Value    Hemoglobin A1C 07/21/2022 5.7      Imaging: No results found.    Review of Systems:  Review of Systems   Constitutional:  Negative for activity change, fatigue, fever and unexpected weight change.   HENT:  Negative for hearing loss and nosebleeds.    Eyes:  Negative for visual disturbance.   Respiratory:  Negative for apnea, shortness of breath, wheezing and stridor.    Cardiovascular:  Negative for chest pain, palpitations and leg swelling.   Gastrointestinal:  Negative for abdominal pain, anal bleeding and blood in stool.   Endocrine: Negative for cold intolerance.   Genitourinary:  Negative for hematuria.   Musculoskeletal:  Negative for arthralgias and gait problem.   Skin:  Negative for pallor and rash.   Allergic/Immunologic: Negative for immunocompromised state.   Neurological:  Negative for dizziness, syncope, facial asymmetry, speech difficulty and weakness.   Hematological:  Does not bruise/bleed easily.   Psychiatric/Behavioral:  Negative for self-injury. The patient is not nervous/anxious.        Physical Exam:  Physical Exam  Vitals reviewed.   Constitutional:       General: He is not in acute distress.     Appearance: Normal appearance. He is normal weight. He is not ill-appearing, toxic-appearing or diaphoretic.   Eyes:      General: No scleral icterus.  Cardiovascular:      Rate and Rhythm: Regular rhythm. Bradycardia present.      Pulses: Normal pulses.      Heart sounds: Normal heart sounds. No murmur heard.     No friction rub. No gallop.   Pulmonary:      Effort: Pulmonary effort is normal. No respiratory distress.      Breath sounds: Normal breath sounds. No stridor. No wheezing, rhonchi or rales.   Musculoskeletal:      Right lower leg: No edema.       Left lower leg: No edema.   Skin:     General: Skin is warm and dry.      Capillary Refill: Capillary refill takes less than 2 seconds.      Coloration: Skin is not jaundiced or pale.      Findings: No bruising or erythema.   Neurological:      Mental Status: He is alert and oriented to person, place, and time.   Psychiatric:         Mood and Affect: Mood normal.         Discussion/Summary:   Coronary artery disease, PTCA/drug stent of the RCA 3 stents plus stage revascularization of the LAD in 2013. Echocardiogram 2021 revealed normal left ventricular systolic function with mild aortic and tricuspid insufficiency, upper limits of normal pulmonary pressures suggested by Doppler criteria.  Normal diastolic parameters.  Stress test at that time he did 7 minutes of Shayan protocol, he did achieve target heart rate with adequate chronotropic competence at achieving 95% maximum predicted heart rate for age, there was no EKG criteria for ischemia or symptoms.  Lipids are well control.  Sick sinus syndrome, ventricular ectopy.  Holter monitor 2022, revealed resting sinus bradycardia average of 47 range from 33-92, over 5000 PVCs including trigeminy but no sustained tachyarrhythmias or ventricular tachycardia.  He is not on beta-blocker because of that despite his CAD.  Borderline indication for pacemaker.  He has remained clinically electrographically in sinus rhythm, does have history of radiofrequency ablation cryoablation for atrial fibrillation/flutter 2014.  Holter monitor 2023 revealed improved heart rate with an average of 63, and mild cough tropic incompetence, minimal ectopy was noted.  We will repeat a Holter monitor.       This note was completed in part utilizing Slide direct voice recognition software.   Grammatical errors, random word insertion, spelling mistakes, and incomplete sentences may be an occasional consequence of the system secondary to software limitations, ambient noise and hardware  issues. At the time of dictation, efforts were made to edit, clarify and /or correct errors.  Please read the chart carefully and recognize, using context, where substitutions have occurred.  If you have any questions or concerns about the context, text or information contained within the body of this dictation, please contact myself, the provider, for further clarification.

## 2024-04-11 ENCOUNTER — HOSPITAL ENCOUNTER (OUTPATIENT)
Dept: NON INVASIVE DIAGNOSTICS | Facility: CLINIC | Age: 84
Discharge: HOME | End: 2024-04-11
Payer: MEDICARE

## 2024-04-11 DIAGNOSIS — Z86.79 STATUS POST RADIOFREQUENCY ABLATION (RFA) OPERATION FOR ARRHYTHMIA: ICD-10-CM

## 2024-04-11 DIAGNOSIS — I49.5 SSS (SICK SINUS SYNDROME) (HCC): ICD-10-CM

## 2024-04-11 DIAGNOSIS — I48.0 PAROXYSMAL ATRIAL FIBRILLATION (HCC): ICD-10-CM

## 2024-04-11 DIAGNOSIS — Z98.890 STATUS POST RADIOFREQUENCY ABLATION (RFA) OPERATION FOR ARRHYTHMIA: ICD-10-CM

## 2024-04-11 PROCEDURE — 93225 XTRNL ECG REC<48 HRS REC: CPT

## 2024-04-11 PROCEDURE — 93226 XTRNL ECG REC<48 HR SCAN A/R: CPT

## 2024-05-15 ENCOUNTER — OFFICE VISIT (OUTPATIENT)
Dept: INTERNAL MEDICINE CLINIC | Facility: CLINIC | Age: 84
End: 2024-05-15
Payer: MEDICARE

## 2024-05-15 VITALS
HEART RATE: 48 BPM | WEIGHT: 208.2 LBS | DIASTOLIC BLOOD PRESSURE: 68 MMHG | BODY MASS INDEX: 26.72 KG/M2 | SYSTOLIC BLOOD PRESSURE: 130 MMHG | OXYGEN SATURATION: 98 % | HEIGHT: 74 IN

## 2024-05-15 DIAGNOSIS — N18.31 STAGE 3A CHRONIC KIDNEY DISEASE (HCC): ICD-10-CM

## 2024-05-15 DIAGNOSIS — E78.00 PURE HYPERCHOLESTEROLEMIA: ICD-10-CM

## 2024-05-15 DIAGNOSIS — G47.33 OSA (OBSTRUCTIVE SLEEP APNEA): ICD-10-CM

## 2024-05-15 DIAGNOSIS — E74.39 GLUCOSE INTOLERANCE: ICD-10-CM

## 2024-05-15 DIAGNOSIS — Z13.0 SCREENING FOR DEFICIENCY ANEMIA: ICD-10-CM

## 2024-05-15 DIAGNOSIS — C67.8 MALIGNANT NEOPLASM OF OVERLAPPING SITES OF BLADDER (HCC): ICD-10-CM

## 2024-05-15 DIAGNOSIS — R73.09 ABNORMAL GLUCOSE: ICD-10-CM

## 2024-05-15 DIAGNOSIS — R00.1 SINUS BRADYCARDIA: Primary | ICD-10-CM

## 2024-05-15 DIAGNOSIS — I48.0 PAROXYSMAL ATRIAL FIBRILLATION (HCC): ICD-10-CM

## 2024-05-15 PROBLEM — M25.512 ACUTE PAIN OF LEFT SHOULDER: Status: RESOLVED | Noted: 2022-01-19 | Resolved: 2024-05-15

## 2024-05-15 PROBLEM — M54.6 ACUTE MIDLINE THORACIC BACK PAIN: Status: RESOLVED | Noted: 2022-01-19 | Resolved: 2024-05-15

## 2024-05-15 PROBLEM — S22.32XD CLOSED FRACTURE OF ONE RIB OF LEFT SIDE WITH ROUTINE HEALING: Status: RESOLVED | Noted: 2022-02-02 | Resolved: 2024-05-15

## 2024-05-15 PROBLEM — B35.1 TOENAIL FUNGUS: Status: RESOLVED | Noted: 2021-10-07 | Resolved: 2024-05-15

## 2024-05-15 PROBLEM — L98.9 SCALP LESION: Status: RESOLVED | Noted: 2021-10-07 | Resolved: 2024-05-15

## 2024-05-15 PROCEDURE — 99215 OFFICE O/P EST HI 40 MIN: CPT | Performed by: INTERNAL MEDICINE

## 2024-05-15 PROCEDURE — G2211 COMPLEX E/M VISIT ADD ON: HCPCS | Performed by: INTERNAL MEDICINE

## 2024-05-15 NOTE — PROGRESS NOTES
Ambulatory Visit  Name: Kelvin Parson II      : 1940      MRN: [id number]  Encounter Provider: Maurice Larson MD  Encounter Date: 5/15/2024   Encounter department: [facility] INTERNAL MEDICINE    Assessment & Plan   1. Pure hypercholesterolemia  Assessment & Plan:  Recommend continuation of cholesterol management atorvastatin 20 mg daily along with low-cholesterol diet  Orders:  -     Lipid panel; Future; Expected date: 11/15/2024  -     Lipid panel  2. Glucose intolerance  -     Comprehensive metabolic panel; Future; Expected date: 11/15/2024  -     Comprehensive metabolic panel  3. Screening for deficiency anemia  -     CBC and differential; Future; Expected date: 11/15/2024  -     CBC and differential  4. Abnormal glucose  -     Hemoglobin A1C; Future; Expected date: 11/15/2024  -     Hemoglobin A1C  5. Sinus bradycardia  Assessment & Plan:  Relative bradycardia with maintaining blood pressure on today's examination reviewed with the patient the fact that certain percentage of patients with a history of atrial fibrillation will go on to need a pacemaker.  I will continue close monitoring of heart rate.  6. Paroxysmal atrial fibrillation (HCC)  Assessment & Plan:  History of paroxysmal atrial fibrillation treated previously with ablation therapy.  Patient is followed by [facility] cardiology and is currently on aspirin therapy 81 mg for stroke risk reduction.  7. LIOR (obstructive sleep apnea)  Assessment & Plan:  Previously diagnosed mild sleep apnea not on treatment at this time  8. Malignant neoplasm of overlapping sites of bladder (HCC)  Assessment & Plan:  History of bladder cancer successfully treated and monitored until 2018 at which time routine surveillance was discontinued no evidence of active cancer at this time  9. Stage 3a chronic kidney disease (HCC)  Assessment & Plan:  Lab Results   Component Value Date    EGFR 71 2023    EGFR 68 2023    EGFR 54 2021     CREATININE 1.04 01/25/2023    CREATININE 1.08 01/24/2023    CREATININE 1.24 01/08/2021   Prior history of chronic kidney disease updated study has been requested for review         History of Present Illness     This pleasant 84-year-old gentleman presents to our office today to establish medical care with our practice.  During today's visit I have spent 10 minutes prior to visit with the patient for review of his available medical records followed by over 50 minutes of face-to-face time with the patient and 12 minutes of postvisit time for completion of chart note and therapeutic plan.    During today's visit with the patient we reviewed his extensive medical history most recent blood work and current medication.    We see that he has a history of coronary artery disease status post stenting of coronary arteries x 4 also a history of atrial fibrillation.  He had a appellation treatment for his atrial fibrillation approximately 10 years ago.  He is followed by [facility]'s cardiology for his cardiac conditions.    Patient has been diagnosed with mild sleep apnea in the past he has chosen not to pursue any treatment with CPAP at this time.    A remote history of bladder cancer was reviewed with the patient today.  After persistence screening routine screening was discontinued in 2018.    Patient has a history of laminectomy at the level L4 and 5 and has had no symptoms related to this surgery.    Patient has stage II chronic kidney disease.  He also has a history of hyperlipidemia currently on statin therapy.      Review of Systems   All other systems reviewed and are negative.    Past Medical History:   Diagnosis Date    Bladder cancer (HCC)     Cancer (HCC) Bladder 1998    Chronic kidney disease 2018    Coronary artery disease 2006    HL (hearing loss) 2018    Irregular heart beat     Afib     Past Surgical History:   Procedure Laterality Date    CARDIAC SURGERY      Afib ablation    CATARACT EXTRACTION, BILATERAL  Bilateral [id number]    IN CYSTO INSERTION TRANSPROSTATIC IMPLANT SINGLE N/A 2021    Procedure: CYSTOSCOPY WITH INSERTION UROLIFT;  Surgeon: Jeffery Law MD;  Location: [facility];  Service: Urology    PROSTATE SURGERY  Uro-lift      Family History   Problem Relation Age of Onset    Pancreatic cancer Mother     Cancer Mother         Bladder /liver    Liver cancer Father     Stroke Father          from stroke    Cancer Father         Pancreas    Cancer Brother      Social History     Tobacco Use    Smoking status: Former     Current packs/day: 0.00     Average packs/day: 0.3 packs/day for 15.0 years (3.8 ttl pk-yrs)     Types: Cigarettes     Start date: 10/8/1961     Quit date: 10/8/1977     Years since quittin.6    Smokeless tobacco: Never   Vaping Use    Vaping status: Never Used   Substance and Sexual Activity    Alcohol use: Yes     Alcohol/week: 5.0 standard drinks of alcohol     Types: 5 Glasses of wine per week     Comment: occasional wine    Drug use: Never    Sexual activity: Not Currently     Partners: Female     Birth control/protection: Condom Male     Comment: Wife on pill     Current Outpatient Medications on File Prior to Visit   Medication Sig    aspirin (ECOTRIN LOW STRENGTH) 81 mg EC tablet Take 81 mg by mouth daily      atorvastatin (LIPITOR) 20 mg tablet TAKE 1 TABLET DAILY    cholecalciferol (VITAMIN D3) 1,000 units tablet Take 1,000 Units by mouth daily    Coenzyme Q10 (CoQ10) 100 MG CAPS Take by mouth daily     cyanocobalamin (VITAMIN B-12) 100 mcg tablet Take by mouth daily    Glucosamine-Chondroit-Vit C-Mn (GLUCOSAMINE CHONDR 1500 COMPLX PO) Take by mouth daily      metroNIDAZOLE (METROGEL) 0.75 % gel Apply topically 2 (two) times a day    Multiple Vitamin (multivitamin) capsule Take 1 capsule by mouth daily    Omega 3 1000 MG CAPS Take by mouth daily      Allergies   Allergen Reactions    Sulfa Antibiotics Hives     Immunization History   Administered Date(s)  "Administered    COVID-19 MODERNA VACC 0.5 ML IM 01/26/2021, 02/24/2021, 10/27/2021, 04/06/2022    COVID-19 Moderna Vac BIVALENT 12 Yr+ IM 0.5 ML 09/27/2022    COVID-19 Moderna mRNA Vaccine 12 Yr+ 50 mcg/0.5 mL (Spikevax) 09/27/2023    DT (pediatric) 08/12/2012    INFLUENZA 10/02/2015, 10/17/2016, 09/01/2017, 09/30/2017, 09/15/2018, 09/26/2018, 08/25/2020, 08/28/2020, 11/01/2021, 10/15/2022, 09/27/2023    Influenza Split 01/01/2011, 01/01/2012, 09/27/2013    Influenza Split High Dose Preservative Free IM 09/26/2018    Pneumococcal Conjugate 13-Valent 03/03/2015    Pneumococcal Polysaccharide PPV23 09/12/2016    Zoster 11/30/2012    Zoster Vaccine Recombinant 07/21/2019, 10/08/2019     Objective     /68   Pulse (!) 48   Ht 6' 2\" (1.88 m)   Wt 94.4 kg (208 lb 3.2 oz)   SpO2 98%   BMI 26.73 kg/m²     Physical Exam  Vitals and nursing note reviewed.   Constitutional:       General: He is not in acute distress.     Appearance: He is well-developed.   HENT:      Head: Normocephalic and atraumatic.   Eyes:      Conjunctiva/sclera: Conjunctivae normal.   Cardiovascular:      Rate and Rhythm: Normal rate and regular rhythm.      Heart sounds: Murmur heard.   Pulmonary:      Effort: Pulmonary effort is normal. No respiratory distress.      Breath sounds: Normal breath sounds. No wheezing, rhonchi or rales.   Abdominal:      General: Abdomen is flat. There is no distension.      Palpations: Abdomen is soft.      Tenderness: There is no abdominal tenderness.   Musculoskeletal:         General: No swelling.      Cervical back: Neck supple.   Skin:     General: Skin is warm and dry.      Capillary Refill: Capillary refill takes less than 2 seconds.   Neurological:      General: No focal deficit present.      Mental Status: He is alert.   Psychiatric:         Mood and Affect: Mood normal.       Administrative Statements         "

## 2024-05-15 NOTE — ASSESSMENT & PLAN NOTE
Relative bradycardia with maintaining blood pressure on today's examination reviewed with the patient the fact that certain percentage of patients with a history of atrial fibrillation will go on to need a pacemaker.  I will continue close monitoring of heart rate.

## 2024-05-15 NOTE — ASSESSMENT & PLAN NOTE
Recommend continuation of cholesterol management atorvastatin 20 mg daily along with low-cholesterol diet

## 2024-05-15 NOTE — ASSESSMENT & PLAN NOTE
Lab Results   Component Value Date    EGFR 71 01/25/2023    EGFR 68 01/24/2023    EGFR 54 01/08/2021    CREATININE 1.04 01/25/2023    CREATININE 1.08 01/24/2023    CREATININE 1.24 01/08/2021   Prior history of chronic kidney disease updated study has been requested for review

## 2024-05-15 NOTE — ASSESSMENT & PLAN NOTE
History of paroxysmal atrial fibrillation treated previously with ablation therapy.  Patient is followed by [facility] cardiology and is currently on aspirin therapy 81 mg for stroke risk reduction.

## 2024-05-15 NOTE — ASSESSMENT & PLAN NOTE
History of bladder cancer successfully treated and monitored until 2018 at which time routine surveillance was discontinued no evidence of active cancer at this time

## 2024-06-19 ENCOUNTER — ESTABLISHED COMPREHENSIVE EXAM (OUTPATIENT)
Dept: URBAN - METROPOLITAN AREA CLINIC 6 | Facility: CLINIC | Age: 84
End: 2024-06-19

## 2024-06-19 DIAGNOSIS — H26.493: ICD-10-CM

## 2024-06-19 PROCEDURE — 92014 COMPRE OPH EXAM EST PT 1/>: CPT

## 2024-06-19 ASSESSMENT — VISUAL ACUITY
OD_CC: 20/25
OS_CC: 20/25+2

## 2024-06-19 ASSESSMENT — TONOMETRY
OD_IOP_MMHG: 12
OS_IOP_MMHG: 10

## 2024-06-24 DIAGNOSIS — E78.00 PURE HYPERCHOLESTEROLEMIA: ICD-10-CM

## 2024-06-24 RX ORDER — ATORVASTATIN CALCIUM 20 MG/1
20 TABLET, FILM COATED ORAL DAILY
Qty: 90 TABLET | Refills: 1 | Status: SHIPPED | OUTPATIENT
Start: 2024-06-24

## 2024-06-24 NOTE — TELEPHONE ENCOUNTER
Reason for call:   [x] Refill   [] Prior Auth  [] Other:     Office:   [x] PCP/Provider -   [] Specialty/Provider -     Medication:   Atorvastatin 20mg- take 1 tablet by mouth daily      Pharmacy: [facility]    Does the patient have enough for 3 days?   [] Yes   [x] No - Send as HP to POD

## 2024-09-26 ENCOUNTER — OFFICE VISIT (OUTPATIENT)
Dept: UROLOGY | Facility: AMBULATORY SURGERY CENTER | Age: 84
End: 2024-09-26

## 2024-09-26 VITALS
WEIGHT: 207 LBS | DIASTOLIC BLOOD PRESSURE: 66 MMHG | SYSTOLIC BLOOD PRESSURE: 140 MMHG | OXYGEN SATURATION: 98 % | BODY MASS INDEX: 26.56 KG/M2 | HEART RATE: 56 BPM | HEIGHT: 74 IN

## 2024-09-26 DIAGNOSIS — R35.0 BENIGN PROSTATIC HYPERPLASIA WITH URINARY FREQUENCY: Primary | ICD-10-CM

## 2024-09-26 DIAGNOSIS — N40.1 BENIGN PROSTATIC HYPERPLASIA WITH URINARY FREQUENCY: Primary | ICD-10-CM

## 2024-09-26 LAB — POST-VOID RESIDUAL VOLUME, ML POC: 32 ML

## 2024-09-26 RX ORDER — TAMSULOSIN HYDROCHLORIDE 0.4 MG/1
0.4 CAPSULE ORAL
Qty: 30 CAPSULE | Refills: 4 | Status: SHIPPED | OUTPATIENT
Start: 2024-09-26

## 2024-09-26 NOTE — PROGRESS NOTES
Assessment and plan:     Benign prostatic hyperplasia with lower urinary tract symptoms  Underwent UroLift by Dr. Law in May 2021  PVR in office today 32 mL  Complaints today in the office of nocturia up to 5 times per night, occasional incontinence and urgency during the day  He is not interested in restarting any anticholinergics for overactive bladder as he has had undesirable side effects in the past  Reinitiate Flomax 0.4 mg, side effect profile discussed  Discussed pelvic floor physical therapy, he states that he will attempt to watch videos at home and do exercises through [facility] physical therapy  Continue with diet lifestyle modifications such as limiting bladder irritants and stopping fluid consumption 2 hours prior to bedtime  Follow-up in 10 weeks with [facility]      History of Present Illness     Kelvin Parson II is a 84 y.o. male who presents today to the office for follow-up of BPH with LUTS.  He also has a remote history of bladder cancer that was diagnosed and treated in 1998.  He underwent a UroLift by Dr. Law in May 2021.  He was last seen in the office in July 2023.    Today in the office he states he is overall doing very well.  He does state that he sometimes has nocturia 5 times per night.  He also states that he will have incontinence throughout the day.  He does wear 1 pad per day for leakage.  He states that when he does have incontinence he does not have the sensation to void and will just leak out.  He states that he will try pelvic floor physical therapy at [facility] as they offer physical therapist at the facility.  He also states that he is not interested in restarting any anticholinergics as he has had undesirable side effects with dry mouth and constipation in the past.  He he states that he was previously on Flomax prior to his UroLift in 2021.  He is amenable to restarting Flomax.  He states that he drinks about 4 cups of water throughout the day.  He  "states that he is hesitant to increase his water and fluid intake due to his nocturia.  He denies any dysuria or hematuria.    Laboratory     Lab Results   Component Value Date    BUN 29 (H) 01/25/2023    CREATININE 1.04 01/25/2023       No components found for: \"GFR\"    Lab Results   Component Value Date    CALCIUM 9.3 01/25/2023    K 4.2 01/25/2023    CO2 25 01/25/2023     01/25/2023       Lab Results   Component Value Date    WBC 5.98 01/08/2021    HGB 14.9 01/08/2021    HCT 46.6 01/08/2021    MCV 96 01/08/2021     01/08/2021       No results found for: \"PSA\"    Recent Results (from the past 1 hour(s))   POCT Measure PVR    Collection Time: 09/26/24  9:25 AM   Result Value Ref Range    POST-VOID RESIDUAL VOLUME, ML POC 32 mL       Review of Systems     Review of Systems   Constitutional:  Negative for chills and fever.   Respiratory: Negative.  Negative for cough and shortness of breath.    Cardiovascular: Negative.  Negative for chest pain.   Gastrointestinal: Negative.  Negative for abdominal distention, abdominal pain, nausea and vomiting.   Genitourinary:  Positive for frequency and urgency. Negative for decreased urine volume, difficulty urinating, dysuria, flank pain, hematuria, penile discharge, penile pain, penile swelling, scrotal swelling and testicular pain.        Nocturia   Skin: Negative.  Negative for rash.   Neurological: Negative.    Hematological:  Negative for adenopathy. Does not bruise/bleed easily.       AUA SYMPTOM SCORE      Flowsheet Row Most Recent Value   AUA SYMPTOM SCORE    How often have you had a sensation of not emptying your bladder completely after you finished urinating? 0 (P)     How often have you had to urinate again less than two hours after you finished urinating? 3 (P)     How often have you found you stopped and started again several times when you urinate? 0 (P)     How often have you found it difficult to postpone urination? 3 (P)     How often have you had " "a weak urinary stream? 1 (P)     How often have you had to push or strain to begin urination? 0 (P)     How many times did you most typically get up to urinate from the time you went to bed at night until the time you got up in the morning? 3 (P)     Quality of Life: If you were to spend the rest of your life with your urinary condition just the way it is now, how would you feel about that? 4 (P)     AUA SYMPTOM SCORE 10 (P)                    Allergies     Allergies   Allergen Reactions    Sulfa Antibiotics Hives       Physical Exam     Physical Exam  Vitals reviewed.   Constitutional:       Appearance: Normal appearance.   HENT:      Head: Normocephalic and atraumatic.   Eyes:      Pupils: Pupils are equal, round, and reactive to light.   Cardiovascular:      Rate and Rhythm: Normal rate.   Pulmonary:      Effort: Pulmonary effort is normal.   Musculoskeletal:      Cervical back: Normal range of motion.   Skin:     General: Skin is warm and dry.   Neurological:      General: No focal deficit present.      Mental Status: He is alert and oriented to person, place, and time.   Psychiatric:         Mood and Affect: Mood normal.         Behavior: Behavior normal.         Thought Content: Thought content normal.         Judgment: Judgment normal.         Vital Signs     Vitals:    09/26/24 0918   BP: 140/66   BP Location: Left arm   Patient Position: Sitting   Cuff Size: Adult   Pulse: 56   SpO2: 98%   Weight: 93.9 kg (207 lb)   Height: 6' 2\" (1.88 m)       Current Medications       Current Outpatient Medications:     aspirin (ECOTRIN LOW STRENGTH) 81 mg EC tablet, Take 81 mg by mouth daily  , Disp: , Rfl:     atorvastatin (LIPITOR) 20 mg tablet, Take 1 tablet (20 mg total) by mouth daily, Disp: 90 tablet, Rfl: 1    cholecalciferol (VITAMIN D3) 1,000 units tablet, Take 1,000 Units by mouth daily, Disp: , Rfl:     Coenzyme Q10 (CoQ10) 100 MG CAPS, Take by mouth daily , Disp: , Rfl:     cyanocobalamin (VITAMIN B-12) 100 " mcg tablet, Take by mouth daily, Disp: , Rfl:     Glucosamine-Chondroit-Vit C-Mn (GLUCOSAMINE CHONDR 1500 COMPLX PO), Take by mouth daily  , Disp: , Rfl:     metroNIDAZOLE (METROGEL) 0.75 % gel, Apply topically 2 (two) times a day, Disp: 90 g, Rfl: 2    Multiple Vitamin (multivitamin) capsule, Take 1 capsule by mouth daily, Disp: , Rfl:     Omega 3 1000 MG CAPS, Take by mouth daily , Disp: , Rfl:     tamsulosin (FLOMAX) 0.4 mg, Take 1 capsule (0.4 mg total) by mouth daily with dinner, Disp: 30 capsule, Rfl: 4    Active Problems     Patient Active Problem List   Diagnosis    Coronary artery disease due to calcified coronary lesion    Malignant neoplasm of overlapping sites of bladder (HCC)    Pure hypercholesterolemia    Urge incontinence    Benign prostatic hyperplasia with lower urinary tract symptoms    H/O laminectomy    Other fatigue    Stage 3a chronic kidney disease (HCC)    LIOR (obstructive sleep apnea)    PLMD (periodic limb movement disorder)    Sinus bradycardia    PVC (premature ventricular contraction)    Bigeminy    Rosacea    Paroxysmal atrial fibrillation (HCC)    Status post radiofrequency ablation (RFA) operation for arrhythmia    Decreased hearing of both ears    OAB (overactive bladder)    Intertrigo    Abnormal laboratory test    Elevated BP without diagnosis of hypertension    Rib pain on left side    Fall    Degenerative spondylolisthesis    SSS (sick sinus syndrome) (HCC)       Past Medical History     Past Medical History:   Diagnosis Date    Bladder cancer (HCC)     Cancer (HCC) Bladder 1998    Chronic kidney disease 2018    Coronary artery disease 2006    HL (hearing loss) 2018    Irregular heart beat     Afib       Surgical History     Past Surgical History:   Procedure Laterality Date    CARDIAC SURGERY      Afib ablation    CATARACT EXTRACTION, BILATERAL Bilateral [id number]    [place] CYSTO INSERTION TRANSPROSTATIC IMPLANT SINGLE N/A 05/28/2021    Procedure: CYSTOSCOPY WITH INSERTION UROLIFT;   Surgeon: Jeffery Law MD;  [facility];  Service: Urology    PROSTATE SURGERY  Uro-lift        Family History     Family History   Problem Relation Age of Onset    Pancreatic cancer Mother     Cancer Mother         Bladder /liver    Liver cancer Father     Stroke Father          from stroke    Cancer Father         Pancreas    Cancer Brother        Social History     Social History     Social History     Tobacco Use   Smoking Status Former    Current packs/day: 0.00    Average packs/day: 0.3 packs/day for 15.0 years (3.8 ttl pk-yrs)    Types: Cigarettes    Start date: 10/8/1961    Quit date: 10/8/1977    Years since quittin.0   Smokeless Tobacco Never       Past Surgical History:   Procedure Laterality Date    CARDIAC SURGERY      Afib ablation    CATARACT EXTRACTION, BILATERAL Bilateral [id number]    [place] CYSTO INSERTION TRANSPROSTATIC IMPLANT SINGLE N/A 2021    Procedure: CYSTOSCOPY WITH INSERTION UROLIFT;  Surgeon: Jeffery Law MD;  [facility];  Service: Urology    PROSTATE SURGERY  Uro-lift          The following portions of the patient's history were reviewed and updated as appropriate: allergies, current medications, past family history, past medical history, past social history, past surgical history and problem list    Please note :  Voice dictation software has been used to create this document.  There may be inadvertent transcription errors.    GALE Perez

## 2024-09-26 NOTE — ASSESSMENT & PLAN NOTE
Underwent UroLift by Dr. Law in May 2021  PVR in office today 32 mL  Complaints today in the office of nocturia up to 5 times per night, occasional incontinence and urgency during the day  He is not interested in restarting any anticholinergics for overactive bladder as he has had undesirable side effects in the past  Reinitiate Flomax 0.4 mg, side effect profile discussed  Discussed pelvic floor physical therapy, he states that he will attempt to watch videos at home and do exercises through [facility] physical therapy  Continue with diet lifestyle modifications such as limiting bladder irritants and stopping fluid consumption 2 hours prior to bedtime  Follow-up in 10 weeks with [facility]

## 2024-10-27 ENCOUNTER — TELEPHONE (OUTPATIENT)
Dept: OTHER | Facility: OTHER | Age: 84
End: 2024-10-27

## 2024-10-27 ENCOUNTER — NURSE TRIAGE (OUTPATIENT)
Dept: OTHER | Facility: OTHER | Age: 84
End: 2024-10-27

## 2024-10-27 DIAGNOSIS — U07.1 COVID: Primary | ICD-10-CM

## 2024-10-27 RX ORDER — NIRMATRELVIR AND RITONAVIR 300-100 MG
3 KIT ORAL 2 TIMES DAILY
Qty: 30 TABLET | Refills: 0 | Status: SHIPPED | OUTPATIENT
Start: 2024-10-27 | End: 2024-11-01

## 2024-10-27 NOTE — TELEPHONE ENCOUNTER
On call provider Dr. Larson sent rx for paxlovid, recommended 1,000mg of vitamin C daily and 2,000 units of vitamin D daily for 2 weeks, hold atorvastatin while on paxlovid, and quarantine for 5 days/ mask additional 5 days. Patient made aware and expressed understanding.

## 2024-10-27 NOTE — TELEPHONE ENCOUNTER
"Reason for Disposition   [1] COVID-19 diagnosed by doctor (or NP/PA) AND [2] mild symptoms (e.g., cough, fever, others) AND [3] no complications or SOB    Answer Assessment - Initial Assessment Questions  1. SYMPTOMS: \"What is your main symptom or concern?\" (e.g., cough, fever, shortness of breath, muscle aches)      Chest congestion, slight fever   2. ONSET: \"When did the symptoms start?\"       Yesterday   3. COUGH: \"Do you have a cough?\" If Yes, ask: \"How bad is the cough?\"        Yes, dry cough   4. FEVER: \"Do you have a fever?\" If Yes, ask: \"What is your temperature, how was it measured, and when did it start?\"      99   5. BREATHING DIFFICULTY: \"Are you having any difficulty breathing?\" (e.g., normal; shortness of breath, wheezing, unable to speak)       No  6. BETTER-SAME-WORSE: \"Are you getting better, staying the same or getting worse compared to yesterday?\"  If getting worse, ask, \"In what way?\"      Worse today   7. OTHER SYMPTOMS: \"Do you have any other symptoms?\"  (e.g., chills, fatigue, headache, loss of smell or taste, muscle pain, sore throat)      Chills, sweating this morning when woke up   8. COVID-19 DIAGNOSIS: \"How do you know that you have COVID?\" (e.g., positive lab test or self-test, diagnosed by doctor or NP/PA, symptoms after exposure).      Tested positive today with home test and confirmed by health center at [facility]   9. COVID-19 EXPOSURE: \"Was there any known exposure to COVID before the symptoms began?\"       Possible exposure from another resident at [facility]   10. COVID-19 VACCINE: \"Have you had the COVID-19 vaccine?\" If Yes, ask: \"When did you last get it?\"        COVID booster 3 weeks ago, and flu shot this past Thursday   11. HIGH RISK DISEASE: \"Do you have any chronic medical problems?\" (e.g., asthma, heart or lung disease, weak immune system, obesity, etc.)        No     13. O2 SATURATION MONITOR:  \"Do you use an oxygen saturation monitor (pulse oximeter) at " "home?\" If Yes, ask \"What is your reading (oxygen level) today?\" \"What is your usual oxygen saturation reading?\" (e.g., 95%)        O2 was tested at health center at [facility] and patient was told it was in 90's    Protocols used: COVID-19 - Diagnosed or Suspected-Adult-AH    "

## 2024-10-27 NOTE — TELEPHONE ENCOUNTER
[facility] calling to speak to on call about an interaction between paxlovid and patients flomax. ESC message sent to on call provider. Provider called the pharmacist and will have the patient hold Lipitor and flomax.

## 2024-11-09 ENCOUNTER — NURSE TRIAGE (OUTPATIENT)
Dept: OTHER | Facility: OTHER | Age: 84
End: 2024-11-09

## 2024-11-09 NOTE — TELEPHONE ENCOUNTER
"Reason for Disposition   [1] COVID-19 diagnosed by positive lab test (e.g., PCR, rapid self-test kit) AND [2] mild symptoms (e.g., cough, fever, others) AND [3] no complications or SOB    Answer Assessment - Initial Assessment Questions  1. SYMPTOMS: \"What is your main symptom or concern?\" (e.g., cough, fever, shortness of breath, muscle aches)      Lethargic and nasal congestion   2. ONSET: \"When did the symptoms start?\"        2 days ago   3. COUGH: \"Do you have a cough?\" If Yes, ask: \"How bad is the cough?\"        No   4. FEVER: \"Do you have a fever?\" If Yes, ask: \"What is your temperature, how was it measured, and when did it start?\"      No   5. BREATHING DIFFICULTY: \"Are you having any difficulty breathing?\" (e.g., normal; shortness of breath, wheezing, unable to speak)       no  6. BETTER-SAME-WORSE: \"Are you getting better, staying the same or getting worse compared to yesterday?\"  If getting worse, ask, \"In what way?\"      Same   7. OTHER SYMPTOMS: \"Do you have any other symptoms?\"  (e.g., chills, fatigue, headache, loss of smell or taste, muscle pain, sore throat)      Fatigue, lethargy, nasal congestion   8. COVID-19 DIAGNOSIS: \"How do you know that you have COVID?\" (e.g., positive lab test or self-test, diagnosed by doctor or NP/PA, symptoms after exposure).   Had covid 2 weeks ago and finished antiviral; now retested as he started with symptoms again so retested today and is positive    Unknown if this is from previous diagnosis 2 weeks ago     2 weeks ago symptoms were more severe than this    Protocols used: COVID-19 - Diagnosed or Suspected-Adult-AH    "

## 2024-11-11 ENCOUNTER — TELEPHONE (OUTPATIENT)
Age: 84
End: 2024-11-11

## 2024-11-11 NOTE — TELEPHONE ENCOUNTER
Pt got labs done thru [facility] and wanted to make sure we got results for appt on thrusday. Please follow up with pt I could find anything from [facility]

## 2024-11-11 NOTE — TELEPHONE ENCOUNTER
Will return to the patient not unusual to stay positive for some time currently he is feeling better.  Will follow-up in the office later this week

## 2024-11-11 NOTE — TELEPHONE ENCOUNTER
Pt called back again. Please call \[facility]\"" labs and ask them what the status of the results.

## 2024-11-14 ENCOUNTER — OFFICE VISIT (OUTPATIENT)
Age: 84
End: 2024-11-14
Payer: MEDICARE

## 2024-11-14 VITALS
HEIGHT: 74 IN | BODY MASS INDEX: 27.28 KG/M2 | OXYGEN SATURATION: 99 % | WEIGHT: 212.6 LBS | HEART RATE: 57 BPM | DIASTOLIC BLOOD PRESSURE: 74 MMHG | SYSTOLIC BLOOD PRESSURE: 126 MMHG | TEMPERATURE: 97.5 F

## 2024-11-14 DIAGNOSIS — U07.1 COVID: Primary | ICD-10-CM

## 2024-11-14 DIAGNOSIS — N18.31 STAGE 3A CHRONIC KIDNEY DISEASE (HCC): ICD-10-CM

## 2024-11-14 DIAGNOSIS — I48.0 PAROXYSMAL ATRIAL FIBRILLATION (HCC): ICD-10-CM

## 2024-11-14 DIAGNOSIS — R21 RASH: ICD-10-CM

## 2024-11-14 DIAGNOSIS — R00.1 SINUS BRADYCARDIA: ICD-10-CM

## 2024-11-14 PROCEDURE — G2211 COMPLEX E/M VISIT ADD ON: HCPCS | Performed by: INTERNAL MEDICINE

## 2024-11-14 PROCEDURE — 99214 OFFICE O/P EST MOD 30 MIN: CPT | Performed by: INTERNAL MEDICINE

## 2024-11-14 RX ORDER — METRONIDAZOLE 7.5 MG/G
GEL TOPICAL 2 TIMES DAILY
Qty: 90 G | Refills: 2 | Status: SHIPPED | OUTPATIENT
Start: 2024-11-14

## 2024-11-14 NOTE — PROGRESS NOTES
Name: Kelvin Parson II      : 1940      MRN: [id number]  Encounter Provider: Maurice Larson MD  Encounter Date: 2024   Encounter department: [facility] INTERNAL MEDICINE    Assessment & Plan  Rash    Orders:    metroNIDAZOLE (METROGEL) 0.75 % gel; Apply topically 2 (two) times a day    Paroxysmal atrial fibrillation (HCC)  Heart rhythm on assessment today is a regular rhythm patient denies any chest pains or palpitations recommend continuation of low-dose aspirin         Sinus bradycardia  Chronic asymptomatic relative bradycardia appreciated patient maintains adequate blood pressure on current heart rate continue surveillance         Stage 3a chronic kidney disease (HCC)  Lab Results   Component Value Date    EGFR 71 2023    EGFR 68 2023    EGFR 54 2021    CREATININE 1.04 2023    CREATININE 1.08 2023    CREATININE 1.24 2021   Patient continues in stage IIIa chronic kidney disease recommend continuation of current her current surveillance avoidance of dehydration and also avoidance of nonsteroidal anti-inflammatories.         COVID  Patient is gradually improving from his recent COVID infection.  Has some degree of residual fatigue symptoms.  No respiratory distress or symptoms noted at this time.  Temperature is normal oxygen saturation on room air is 99%.  Discussed with the patient that some patients require 6 to 10 to 8 weeks for complete resolution of COVID infection.  No indication of ongoing infection at this time              History of Present Illness     Patient is an 84-year-old gentleman who returns to our office today.  Recently was diagnosed with COVID infection and is gradually improving.  Still has some residual symptoms of fatigue.  Oxygen saturation on room air is 99% he shows no respiratory distress cough or wheezing.  No fevers or chills at the present time no myalgias or arthralgias are noted at the present  time.      Review of Systems   Constitutional:  Positive for fatigue.   All other systems reviewed and are negative.    Past Medical History:   Diagnosis Date    Bladder cancer (HCC)     Cancer (HCC) Bladder 1998    Chronic kidney disease 2018    Coronary artery disease     HL (hearing loss) 2018    Irregular heart beat     Afib     Past Surgical History:   Procedure Laterality Date    CARDIAC SURGERY      Afib ablation    CATARACT EXTRACTION, BILATERAL Bilateral [id number]    [place] CYSTO INSERTION TRANSPROSTATIC IMPLANT SINGLE N/A 2021    Procedure: CYSTOSCOPY WITH INSERTION UROLIFT;  Surgeon: Jeffery Law MD;  Location: [facility];  Service: Urology    PROSTATE SURGERY  Uro-lift      Family History   Problem Relation Age of Onset    Pancreatic cancer Mother     Cancer Mother         Bladder /liver    Liver cancer Father     Stroke Father          from stroke    Cancer Father         Pancreas    Cancer Brother      Social History     Tobacco Use    Smoking status: Former     Current packs/day: 0.00     Average packs/day: 0.3 packs/day for 15.0 years (3.8 ttl pk-yrs)     Types: Cigarettes     Start date: 10/8/1961     Quit date: 10/8/1977     Years since quittin.1    Smokeless tobacco: Never   Vaping Use    Vaping status: Never Used   Substance and Sexual Activity    Alcohol use: Yes     Alcohol/week: 5.0 standard drinks of alcohol     Types: 5 Glasses of wine per week     Comment: occasional wine    Drug use: Never    Sexual activity: Not Currently     Partners: Female     Birth control/protection: Condom Male     Comment: Wife on pill     Current Outpatient Medications on File Prior to Visit   Medication Sig    aspirin (ECOTRIN LOW STRENGTH) 81 mg EC tablet Take 81 mg by mouth daily      atorvastatin (LIPITOR) 20 mg tablet Take 1 tablet (20 mg total) by mouth daily    cholecalciferol (VITAMIN D3) 1,000 units tablet Take 1,000 Units by mouth daily    Coenzyme Q10 (CoQ10) 100 MG CAPS  "Take by mouth daily     cyanocobalamin (VITAMIN B-12) 100 mcg tablet Take by mouth daily    Glucosamine-Chondroit-Vit C-Mn (GLUCOSAMINE CHONDR 1500 COMPLX PO) Take by mouth daily      Multiple Vitamin (multivitamin) capsule Take 1 capsule by mouth daily    Omega 3 1000 MG CAPS Take by mouth daily     tamsulosin (FLOMAX) 0.4 mg Take 1 capsule (0.4 mg total) by mouth daily with dinner    [DISCONTINUED] metroNIDAZOLE (METROGEL) 0.75 % gel Apply topically 2 (two) times a day     Allergies   Allergen Reactions    Sulfa Antibiotics Hives     Immunization History   Administered Date(s) Administered    COVID-19 MODERNA VACC 0.5 ML IM 01/26/2021, 02/24/2021, 10/27/2021, 04/06/2022    COVID-19 Moderna Vac BIVALENT 12 Yr+ IM 0.5 ML 09/27/2022    COVID-19 Moderna mRNA Vaccine 12 Yr+ 50 mcg/0.5 mL (Spikevax) 09/27/2023    COVID-19 Pfizer mRNA vacc PF tony-sucrose 12 yr and older (Comirnaty) 09/20/2024    DT (pediatric) 08/12/2012    INFLUENZA 10/02/2015, 10/17/2016, 09/01/2017, 09/30/2017, 09/15/2018, 09/26/2018, 08/25/2020, 08/28/2020, 11/01/2021, 10/15/2022, 09/27/2023    Influenza Split 01/01/2011, 01/01/2012, 09/27/2013    Influenza Split High Dose Preservative Free IM 10/17/2016, 09/26/2018, 10/24/2024    Pneumococcal Conjugate 13-Valent 03/03/2015    Pneumococcal Polysaccharide PPV23 09/12/2016    Zoster 11/30/2012    Zoster Vaccine Recombinant 07/21/2019, 10/08/2019     Objective   /74   Pulse 57   Temp 97.5 °F (36.4 °C) (Tympanic)   Ht 6' 2\" (1.88 m)   Wt 96.4 kg (212 lb 9.6 oz)   SpO2 99%   BMI 27.30 kg/m²     Physical Exam  Vitals and nursing note reviewed.   Constitutional:       General: He is not in acute distress.     Appearance: He is well-developed.   HENT:      Head: Normocephalic and atraumatic.      Right Ear: Tympanic membrane, ear canal and external ear normal.      Left Ear: Tympanic membrane, ear canal and external ear normal.      Nose: Nose normal.      Mouth/Throat:      Mouth: Mucous " membranes are moist.      Pharynx: Oropharynx is clear.   Eyes:      Conjunctiva/sclera: Conjunctivae normal.   Cardiovascular:      Rate and Rhythm: Normal rate and regular rhythm.      Heart sounds: Normal heart sounds. No murmur heard.  Pulmonary:      Effort: Pulmonary effort is normal. No respiratory distress.      Breath sounds: Normal breath sounds. No wheezing, rhonchi or rales.   Abdominal:      Palpations: Abdomen is soft.      Tenderness: There is no abdominal tenderness.   Musculoskeletal:         General: No swelling.      Cervical back: Neck supple.   Skin:     General: Skin is warm and dry.      Capillary Refill: Capillary refill takes less than 2 seconds.   Neurological:      Mental Status: He is alert.   Psychiatric:         Mood and Affect: Mood normal.

## 2024-11-14 NOTE — ASSESSMENT & PLAN NOTE
Patient is gradually improving from his recent COVID infection.  Has some degree of residual fatigue symptoms.  No respiratory distress or symptoms noted at this time.  Temperature is normal oxygen saturation on room air is 99%.  Discussed with the patient that some patients require 6 to 10 to 8 weeks for complete resolution of COVID infection.  No indication of ongoing infection at this time

## 2024-11-14 NOTE — ASSESSMENT & PLAN NOTE
PATIENT INFORMATION    Anticipatory guidance discussed  Adequate diet for breastfeeding  Avoid infant walkers  Avoid potential choking hazards (large, spherical, or coin shaped foods)   Avoid putting to bed with bottle  Avoid small toys (choking hazard)  Car seat issues, including proper placement and transition to toddler seat at 20 pounds  Caution with possible poisons (including pills, plants, and cosmetics)  Child-proof home with cabinet locks, outlet plugs, window guards, and stair safety wolf  Discipline issues: limit-setting, positive reinforcement  Fluoride supplementation if unfluoridated water supply  Importance of varied diet  Make middle-of-night feeds \"brief and boring\"  Never leave unattended  Observe while eating; consider CPR classes  Obtain and know how to use thermometer  Place in crib before completely asleep  Poison Control phone number [phone]  Risk of child pulling down objects on him/herself  Safe sleep furniture  Set hot water heater less than 120 degrees F  Smoke detectors  Special weaning formulas rarely useful  Use of transitional object (nickolas bear, etc.) to help with sleep  Wean to cup at 9-12 months of age  Whole milk until 2 years old then taper to low-fat or skim  Wind-down activities to help with sleep    Follow-Up  - Return for your 15 month well child visit.    1 year old Health and Safety Tips - The following hyperlinks are available to access via [employer]    Parent Education from Healthy Parent    Educación para padres sobre niños sanos    Common dosing for acetaminophen and ibuprofen:   Acetaminophen (Tylenol) can be given every 4 hours.  · Infant or Children's Elixir: 160mg/5ml for  Weight 6-11 lbs 12-17 lbs 18-23 lbs 24-35 lbs   Dose 1.25 ml 2.5 ml 3.75 ml 5 ml      Weight 36-47 lbs 48-59 lbs 60-71 lsb 72-95 lbs   Dose 7.5 ml 10 ml 12.5 ml 15 ml     Ibuprofen (Motrin) can be given every 6 hours.  Safe for children 6 months and older.  · Infant Drops:  Lab Results   Component Value Date    EGFR 71 01/25/2023    EGFR 68 01/24/2023    EGFR 54 01/08/2021    CREATININE 1.04 01/25/2023    CREATININE 1.08 01/24/2023    CREATININE 1.24 01/08/2021   Patient continues in stage IIIa chronic kidney disease recommend continuation of current her current surveillance avoidance of dehydration and also avoidance of nonsteroidal anti-inflammatories.          50mg/1.25ml  Weight 12-17 lbs 18-23 lbs   Dose 1.25 ml 1.875     · Children's Elixir 100mg/5ml   Weight 12-17 lbs 18-23 lbs 24-35 lbs 36-47 lbs 48-59 lbs   Dose 2.5 ml 3.75 ml 5 ml 7.5 ml 10 ml        Weight 60-71 lbs 72-95 lbs   Dose 12.5 ml 15 ml     Additional Educational Resources:  For additional resources regarding your symptoms, diagnosis, or further health information, please visit the Discover a Healthier You section on [email or web address] or the Online Health Resources section in [employer].

## 2024-11-14 NOTE — ASSESSMENT & PLAN NOTE
Heart rhythm on assessment today is a regular rhythm patient denies any chest pains or palpitations recommend continuation of low-dose aspirin

## 2024-11-14 NOTE — ASSESSMENT & PLAN NOTE
Chronic asymptomatic relative bradycardia appreciated patient maintains adequate blood pressure on current heart rate continue surveillance

## 2024-12-05 ENCOUNTER — OFFICE VISIT (OUTPATIENT)
Dept: UROLOGY | Facility: AMBULATORY SURGERY CENTER | Age: 84
End: 2024-12-05
Payer: MEDICARE

## 2024-12-05 ENCOUNTER — TELEPHONE (OUTPATIENT)
Age: 84
End: 2024-12-05

## 2024-12-05 VITALS
SYSTOLIC BLOOD PRESSURE: 118 MMHG | OXYGEN SATURATION: 99 % | HEIGHT: 74 IN | HEART RATE: 52 BPM | DIASTOLIC BLOOD PRESSURE: 64 MMHG | BODY MASS INDEX: 27.3 KG/M2

## 2024-12-05 DIAGNOSIS — R35.0 BENIGN PROSTATIC HYPERPLASIA WITH URINARY FREQUENCY: ICD-10-CM

## 2024-12-05 DIAGNOSIS — N40.1 BENIGN PROSTATIC HYPERPLASIA WITH URINARY FREQUENCY: ICD-10-CM

## 2024-12-05 PROCEDURE — 99213 OFFICE O/P EST LOW 20 MIN: CPT

## 2024-12-05 RX ORDER — TAMSULOSIN HYDROCHLORIDE 0.4 MG/1
0.4 CAPSULE ORAL
Qty: 90 CAPSULE | Refills: 3 | Status: SHIPPED | OUTPATIENT
Start: 2024-12-05

## 2024-12-05 NOTE — TELEPHONE ENCOUNTER
Patient had an apt with Vale today and states he thinks he left his bottle of Tamsulosin in the room. Requesting a call back in regards to this.     CB: [phone]

## 2024-12-05 NOTE — ASSESSMENT & PLAN NOTE
Underwent UroLift by Dr. Law in May 2021  Continue with Flomax 0.4 mg  Has been on anticholinergics in the past and discontinued due to undesirable side effects with dry mouth and constipation, we did discuss beta 3 agonist with Myrbetriq, he states that he would like to defer at this time due to side effects and cost  Discussed role for pelvic floor physical therapy  Discussed role for third line therapies for overactive bladder including bladder Botox and InterStim, he wishes to defer at this time, he states that he might be interested in bladder Botox in the future we will call our office if he wants to proceed  Continue with diet lifestyle modifications such as limiting bladder irritants and stopping fluid consumption 2 hours prior to bedtime  Follow up in 1 year    Orders:    tamsulosin (FLOMAX) 0.4 mg; Take 1 capsule (0.4 mg total) by mouth daily with dinner

## 2024-12-05 NOTE — PROGRESS NOTES
Name: Kelvin Parson II      : 1940      MRN: [id number]  Encounter Provider: GALE Perez  Encounter Date: 2024   Encounter department: [facility] [place]  :  Assessment & Plan  Benign prostatic hyperplasia with urinary frequency  Underwent UroLift by Dr. Law in May 2021  Continue with Flomax 0.4 mg  Has been on anticholinergics in the past and discontinued due to undesirable side effects with dry mouth and constipation, we did discuss beta 3 agonist with Myrbetriq, he states that he would like to defer at this time due to side effects and cost  Discussed role for pelvic floor physical therapy  Discussed role for third line therapies for overactive bladder including bladder Botox and InterStim, he wishes to defer at this time, he states that he might be interested in bladder Botox in the future we will call our office if he wants to proceed  Continue with diet lifestyle modifications such as limiting bladder irritants and stopping fluid consumption 2 hours prior to bedtime  Follow up in 1 year    Orders:    tamsulosin (FLOMAX) 0.4 mg; Take 1 capsule (0.4 mg total) by mouth daily with dinner        History of Present Illness   Kelvin Parson II is a 84 y.o. male who presents today to the office for follow-up of BPH with LUTS.    He underwent a UroLift by Dr. Law in May 2021.  He was last seen in the office in 2024.  At that time he had complaints of nocturia 5 times per night.  Is also reporting occasional incontinence throughout the day.  He was previously on anticholinergics but discontinued the medication due to undesirable side effects with dry mouth and constipation.  He was restarted on Flomax 0.4 mg at his last office visit.    Today in the office he states that his flow has significantly improved since starting the Flomax.  He states that he still is continuing with urgency and frequency throughout the day.  He states that he is not  "interested in any additional interventions at this time but may be in the future.    AUA SYMPTOM SCORE      Flowsheet Row Most Recent Value   AUA SYMPTOM SCORE    How often have you had a sensation of not emptying your bladder completely after you finished urinating? 0 (P)     How often have you had to urinate again less than two hours after you finished urinating? 0 (P)     How often have you found you stopped and started again several times when you urinate? 0 (P)     How often have you found it difficult to postpone urination? 1 (P)     How often have you had a weak urinary stream? 0 (P)     How often have you had to push or strain to begin urination? 0 (P)     How many times did you most typically get up to urinate from the time you went to bed at night until the time you got up in the morning? 5 (P)     Quality of Life: If you were to spend the rest of your life with your urinary condition just the way it is now, how would you feel about that? 3 (P)     AUA SYMPTOM SCORE 6 (P)            Review of Systems   Constitutional:  Negative for chills and fever.   Respiratory: Negative.  Negative for cough and shortness of breath.    Cardiovascular: Negative.  Negative for chest pain.   Gastrointestinal: Negative.  Negative for abdominal distention, abdominal pain, nausea and vomiting.   Genitourinary:  Positive for frequency and urgency. Negative for decreased urine volume, difficulty urinating, dysuria, flank pain, hematuria, penile discharge, penile pain, penile swelling, scrotal swelling and testicular pain.   Skin: Negative.  Negative for rash.   Neurological: Negative.    Hematological:  Negative for adenopathy. Does not bruise/bleed easily.          Objective   /64 (BP Location: Left arm, Patient Position: Sitting, Cuff Size: Adult)   Pulse (!) 52   Ht 6' 2\" (1.88 m)   SpO2 99%   BMI 27.30 kg/m²     Physical Exam  Vitals reviewed.   Constitutional:       Appearance: Normal appearance.   HENT:      " "Head: Normocephalic and atraumatic.   Eyes:      Pupils: Pupils are equal, round, and reactive to light.   Cardiovascular:      Rate and Rhythm: Normal rate.   Pulmonary:      Effort: Pulmonary effort is normal.   Musculoskeletal:      Cervical back: Normal range of motion.   Skin:     General: Skin is warm and dry.   Neurological:      General: No focal deficit present.      Mental Status: He is alert and oriented to person, place, and time.   Psychiatric:         Mood and Affect: Mood normal.         Behavior: Behavior normal.         Thought Content: Thought content normal.         Judgment: Judgment normal.          Results  No results found for: \"PSA\"  Lab Results   Component Value Date    CALCIUM 9.3 01/25/2023    K 4.2 01/25/2023    CO2 25 01/25/2023     01/25/2023    BUN 29 (H) 01/25/2023    CREATININE 1.04 01/25/2023     Lab Results   Component Value Date    WBC 5.98 01/08/2021    HGB 14.9 01/08/2021    HCT 46.6 01/08/2021    MCV 96 01/08/2021     01/08/2021       Office Urine Dip  No results found for this or any previous visit (from the past hour).]      "

## 2024-12-19 DIAGNOSIS — E78.00 PURE HYPERCHOLESTEROLEMIA: ICD-10-CM

## 2024-12-19 RX ORDER — ATORVASTATIN CALCIUM 20 MG/1
20 TABLET, FILM COATED ORAL DAILY
Qty: 90 TABLET | Refills: 1 | Status: SHIPPED | OUTPATIENT
Start: 2024-12-19

## 2025-03-04 ENCOUNTER — TELEPHONE (OUTPATIENT)
Age: 85
End: 2025-03-04

## 2025-03-04 NOTE — TELEPHONE ENCOUNTER
I do not have any recommendations in this situation he is probably just can have to check with different dental offices.  Thank you

## 2025-03-04 NOTE — TELEPHONE ENCOUNTER
Jj called wanting to know if there were any local dentist or orthodontics that would have a dental appliance. Stated that he is dealing with minor sleep apnea and there is a dental appliance that can be inserted to the mouth that repositions the jaw to help with breathing during sleep.     Wanted to know if Dr. Larson had any recommendation on how he could obtain this. If there were any local dentist offices that would have this.    Please advise out to Jj to further assist.

## 2025-03-05 DIAGNOSIS — G47.30 SLEEP APNEA, UNSPECIFIED TYPE: Primary | ICD-10-CM

## 2025-03-05 NOTE — TELEPHONE ENCOUNTER
Patient called back to follow-up - asking if provider has any experience (previous/current patients) or knowledge about this particular dental appliance being used for minor sleep apnea and if it has been successful or not. Please advise.

## 2025-03-05 NOTE — TELEPHONE ENCOUNTER
I have no experience with dental appliances for treatment of sleep apnea.  I did put a referral to Dr. Perez of sleep medicine.  The patient can call [phone] to schedule a consultation.  Thank you

## 2025-03-17 ENCOUNTER — TELEPHONE (OUTPATIENT)
Age: 85
End: 2025-03-17

## 2025-03-17 NOTE — TELEPHONE ENCOUNTER
Patient is calling to make sure Dr. Villafana takes a look at his sleep study from 2020 before he comes into the office to see him.      Thank you.

## 2025-03-20 ENCOUNTER — CONSULT (OUTPATIENT)
Dept: PULMONOLOGY | Facility: CLINIC | Age: 85
End: 2025-03-20
Payer: MEDICARE

## 2025-03-20 VITALS
BODY MASS INDEX: 27.46 KG/M2 | HEART RATE: 64 BPM | OXYGEN SATURATION: 98 % | WEIGHT: 214 LBS | HEIGHT: 74 IN | DIASTOLIC BLOOD PRESSURE: 72 MMHG | SYSTOLIC BLOOD PRESSURE: 150 MMHG | TEMPERATURE: 96.8 F

## 2025-03-20 DIAGNOSIS — I48.0 PAROXYSMAL ATRIAL FIBRILLATION (HCC): ICD-10-CM

## 2025-03-20 DIAGNOSIS — G47.33 OSA (OBSTRUCTIVE SLEEP APNEA): Primary | ICD-10-CM

## 2025-03-20 PROCEDURE — 99204 OFFICE O/P NEW MOD 45 MIN: CPT | Performed by: INTERNAL MEDICINE

## 2025-03-20 NOTE — ASSESSMENT & PLAN NOTE
He has history of paroxysmal atrial fibrillation and he is status post cardiac ablation therapy.  Currently he is not on any systemic anticoagulation.  He has noted that his heart rate is low when he is sleeping, on his electronic watch monitor.  He had previous history of bigeminy.  He also stated that his deep sleep has been low.  Orders:    Diagnostic Sleep Study; Future

## 2025-03-20 NOTE — PROGRESS NOTES
Name: Kelvin Parson II      : 1940      MRN: [id number]  Encounter Provider: Luis Villafana MD  Encounter Date: 3/20/2025   Encounter department: [facility] [place]  :  Assessment & Plan  LIOR (obstructive sleep apnea)  Mr.Russell Parson was diagnosed with obstructive sleep apnea mild degree on 2020 on a diagnostic overnight sleep study.  His overall AHI was 6.4 and he had oxygen desaturation to 86%.  His subsequent CPAP titration study showed a CPAP pressure requirement of 7 cm of water.  He did not start himself on CPAP therapy.  Currently he has interrupted sleep and wakes up not refreshed.  He feels sleepy and tired during the day.  His [place] sleepiness score was 6 out of 24.  He now wants to be treated for his sleep apnea.  He was considering oral appliance.  On clinical examination, he had oropharyngeal crowding.  He needs a repeat sleep study to reconfirm his sleep apnea.  We will await the results before planning any treatment I had a long discussion with him and answered all his questions.  Orders:    Diagnostic Sleep Study; Future    Paroxysmal atrial fibrillation (HCC)  He has history of paroxysmal atrial fibrillation and he is status post cardiac ablation therapy.  Currently he is not on any systemic anticoagulation.  He has noted that his heart rate is low when he is sleeping, on his electronic watch monitor.  He had previous history of bigeminy.  He also stated that his deep sleep has been low.  Orders:    Diagnostic Sleep Study; Future        History of Present Illness   HPI  Kelvin Parson II is a 85 y.o. male past medical history of cancer, obstructive sleep apnea, paroxysmal atrial fibrillation status post cardiac ablation therapy, bigeminy who has been referred for evaluation for sleep breathing disorder.  His sleep is interrupted and he wakes up not refreshed.  He has had snoring.  No witnessed apneas as he is living alone.  He denied  any waking up episodes during sleep gasping for air.  No morning headache.  He feels sleepy and tired during the day.  His [place] sleepiness score was 6 out of 24.  He had a diagnostic overnight sleep study on 11/16/2020 which showed mild obstructive sleep apnea with overall AHI of 6.4 and oxygen desaturation to 86%.  He also had some periodic limb movements.  His subsequent CPAP titration study showed CPAP pressure requirement of 7 cm of water.  He was advised CPAP therapy but he did not use the CPAP.  Currently his symptoms have worsened.  He denied any problem driving.  He denied any chest pain or palpitations.  He underwent cardiac ablation therapy for his paroxysmal atrial fibrillation.  He has not had any recurrence.  However on his electronic watch/phone he has noted that his deep sleep is low and he has low heart rate in the 40s.  He does not have any hypertension or diabetes.  No previous stroke or seizure.  His appetite has been good.  He does not smoke or consume alcohol excessively.  No history of asthma or COPD.  He denied any shortness of breath or cough or phlegm or wheeze or chest pain.  He has not been happy with CPAP and has been considering oral appliance.  He needs a repeat sleep study to reconfirm his sleep apnea before planning further treatment.      Review of Systems   Constitutional:  Positive for fatigue. Negative for appetite change, chills and fever.   HENT:  Positive for hearing loss. Negative for rhinorrhea, sneezing, sore throat and trouble swallowing.    Respiratory:  Negative for apnea, cough, choking, chest tightness, shortness of breath and wheezing.    Cardiovascular:  Negative for chest pain, palpitations and leg swelling.   Gastrointestinal:  Negative for abdominal pain, constipation, diarrhea, nausea and vomiting.   Genitourinary:  Positive for frequency. Negative for dysuria and urgency.   Musculoskeletal:  Negative for arthralgias, back pain and gait problem.   Skin:   "Negative for rash.   Allergic/Immunologic: Negative for environmental allergies.   Neurological:  Negative for dizziness, syncope, light-headedness and headaches.   Psychiatric/Behavioral:  Positive for sleep disturbance. Negative for agitation and confusion. The patient is not nervous/anxious.           Objective   /72 (BP Location: Left arm, Patient Position: Sitting, Cuff Size: Standard)   Pulse 64   Temp (!) 96.8 °F (36 °C) (Tympanic)   Ht 6' 2\" (1.88 m)   Wt 97.1 kg (214 lb)   SpO2 98%   BMI 27.48 kg/m²      Physical Exam  Vitals reviewed.   Constitutional:       General: He is not in acute distress.     Appearance: He is not ill-appearing, toxic-appearing or diaphoretic.   HENT:      Head: Normocephalic.      Mouth/Throat:      Mouth: Mucous membranes are moist.      Comments: Oropharyngeal crowding  Eyes:      General: No scleral icterus.     Conjunctiva/sclera: Conjunctivae normal.   Cardiovascular:      Rate and Rhythm: Normal rate and regular rhythm.      Heart sounds: Normal heart sounds. No murmur heard.  Pulmonary:      Effort: Pulmonary effort is normal. No respiratory distress.      Breath sounds: Normal breath sounds. No wheezing, rhonchi or rales.   Abdominal:      General: Bowel sounds are normal.      Palpations: Abdomen is soft.      Tenderness: There is no abdominal tenderness. There is no guarding.   Musculoskeletal:      Cervical back: Neck supple. No rigidity.      Right lower leg: No edema.      Left lower leg: No edema.   Lymphadenopathy:      Cervical: No cervical adenopathy.   Skin:     Coloration: Skin is not jaundiced or pale.      Findings: No rash.   Neurological:      Mental Status: He is alert and oriented to person, place, and time.      Gait: Gait normal.   Psychiatric:         Mood and Affect: Mood normal.         Behavior: Behavior normal.         Thought Content: Thought content normal.         Judgment: Judgment normal.           "

## 2025-03-20 NOTE — ASSESSMENT & PLAN NOTE
Mr.Russell Parson was diagnosed with obstructive sleep apnea mild degree on 11/16/2020 on a diagnostic overnight sleep study.  His overall AHI was 6.4 and he had oxygen desaturation to 86%.  His subsequent CPAP titration study showed a CPAP pressure requirement of 7 cm of water.  He did not start himself on CPAP therapy.  Currently he has interrupted sleep and wakes up not refreshed.  He feels sleepy and tired during the day.  His [place] sleepiness score was 6 out of 24.  He now wants to be treated for his sleep apnea.  He was considering oral appliance.  On clinical examination, he had oropharyngeal crowding.  He needs a repeat sleep study to reconfirm his sleep apnea.  We will await the results before planning any treatment I had a long discussion with him and answered all his questions.  Orders:    Diagnostic Sleep Study; Future

## 2025-03-21 ENCOUNTER — HOSPITAL ENCOUNTER (OUTPATIENT)
Dept: SLEEP CENTER | Facility: CLINIC | Age: 85
Discharge: HOME | End: 2025-03-21
Payer: MEDICARE

## 2025-03-21 DIAGNOSIS — I48.0 PAROXYSMAL ATRIAL FIBRILLATION (HCC): ICD-10-CM

## 2025-03-21 DIAGNOSIS — G47.33 OSA (OBSTRUCTIVE SLEEP APNEA): ICD-10-CM

## 2025-03-21 PROCEDURE — 95810 POLYSOM 6/> YRS 4/> PARAM: CPT

## 2025-03-21 PROCEDURE — 95810 POLYSOM 6/> YRS 4/> PARAM: CPT | Performed by: INTERNAL MEDICINE

## 2025-03-22 NOTE — PROGRESS NOTES
Sleep Study Documentation    Pre-Sleep Study       Sleep testing procedure explained to patient:YES    Patient napped prior to study:NO    Caffeine:Dayshift worker after 12PM.  Caffeine use:NO    Alcohol:Dayshift workers after 5PM: Alcohol use:NO    Typical day for patient:YES       Study Documentation    Sleep Study Indications: Loud/Habitual Snoring, Excessive daytime sleepiness     Sleep Study: Diagnostic   Snore:Moderate  Supplemental O2: no      Minimum SaO2 68%  Baseline SaO2 96%    EKG abnormalities: yes: possible PVCS  EEG abnormalitie    Were abnormal behaviors in sleep observed:NO    Is Total Sleep Study Recording Time < 2 hours: N/A    Is Total Sleep Study Recording Time > 2 hours but study is incomplete: N/A    Is Total Sleep Study Recording Time 6 hours or more but sleep was not obtained: NO    Patient classification: retired       Post-Sleep Study    Medication used at bedtime or during sleep study:YES other prescription medications    Patient reports time it took to fall asleep:less than 20 minutes    Patient reports waking up during study:1 to 2 times.  Patient reports returning to sleep without difficulty.    Patient reports sleeping 4 to 6 hours with dreaming.    Does the Patient feel this is a typical night of sleep:typical    Patient rated sleepiness: Somewhat sleepy or tired    PAP treatment:no.

## 2025-03-24 ENCOUNTER — RESULTS FOLLOW-UP (OUTPATIENT)
Dept: PULMONOLOGY | Facility: CLINIC | Age: 85
End: 2025-03-24

## 2025-03-24 DIAGNOSIS — G47.33 OSA (OBSTRUCTIVE SLEEP APNEA): Primary | ICD-10-CM

## 2025-04-16 ENCOUNTER — NURSE TRIAGE (OUTPATIENT)
Age: 85
End: 2025-04-16

## 2025-04-16 DIAGNOSIS — R00.1 BRADYCARDIA: Primary | ICD-10-CM

## 2025-04-16 DIAGNOSIS — R42 LIGHTHEADEDNESS: ICD-10-CM

## 2025-04-16 NOTE — TELEPHONE ENCOUNTER
"FOLLOW UP: pt is scheduled to see Dr. Herrera 7/15, no sooner openings. Pt only wanted to see Dr. Herrera.  Added appt to wait list.  Advised pt I would send a message to Dr. Herrera to review.    REASON FOR CONVERSATION: Dizziness    SYMPTOMS: pt has been experiencing intermittent lightheadedness for 2 weeks.  He's noticing it when he stands up, or goes on his walks.  He does get sob when walking up hill.  HR at night drops as low as 39 bpm.  HR during the day trends 40-50's. BP last month was 128/63 and 133/67, HR 45 and 48 bpm.      OTHER: medications: atorvastatin 20 mg qd, asa 81 mg qd    DISPOSITION: See Today in Office      Reason for Disposition   MODERATE dizziness (e.g., interferes with normal activities)  (Exception: Dizziness caused by heat exposure, sudden standing, or poor fluid intake.)    Answer Assessment - Initial Assessment Questions  1. DESCRIPTION: \"Describe your dizziness.\"      Intermittent lightheadedness   2. LIGHTHEADED: \"Do you feel lightheaded?\" (e.g., somewhat faint, woozy, weak upon standing)      Yes   3. VERTIGO: \"Do you feel like either you or the room is spinning or tilting?\" (i.e., vertigo)      Denies   4. SEVERITY: \"How bad is it?\"  \"Do you feel like you are going to faint?\" \"Can you stand and walk?\"      Occurs with walking, gets lightheadedness during the walk at points   5. ONSET:  \"When did the dizziness begin?\"      2 weeks ago   6. AGGRAVATING FACTORS: \"Does anything make it worse?\" (e.g., standing, change in head position)      Standing   7. HEART RATE: \"Can you tell me your heart rate?\" \"How many beats in 15 seconds?\"  (Note: Not all patients can do this.)        At night as low as 39 bpm. During the day 40-50's  8. CAUSE: \"What do you think is causing the dizziness?\" (e.g., decreased fluids or food, diarrhea, emotional distress, heat exposure, new medicine, sudden standing, vomiting; unknown)      Unsure   9. RECURRENT SYMPTOM: \"Have you had dizziness before?\" If Yes, ask: " "\"When was the last time?\" \"What happened that time?\"      Yes, would occur occasionally but now is more persistent   10. OTHER SYMPTOMS: \"Do you have any other symptoms?\" (e.g., fever, chest pain, vomiting, diarrhea, bleeding)        Sob w/ exertion(up a hill)    Protocols used: Dizziness-Adult-OH    "

## 2025-04-17 ENCOUNTER — PROCEDURE VISIT (OUTPATIENT)
Dept: CARDIOLOGY CLINIC | Facility: CLINIC | Age: 85
End: 2025-04-17

## 2025-04-17 DIAGNOSIS — R00.1 BRADYCARDIA: ICD-10-CM

## 2025-04-17 DIAGNOSIS — R42 DIZZINESS: Primary | ICD-10-CM

## 2025-04-17 PROCEDURE — RECHECK: Performed by: INTERNAL MEDICINE

## 2025-04-23 ENCOUNTER — HOSPITAL ENCOUNTER (OUTPATIENT)
Dept: NON INVASIVE DIAGNOSTICS | Facility: HOSPITAL | Age: 85
Discharge: HOME | End: 2025-04-23
Payer: MEDICARE

## 2025-04-23 DIAGNOSIS — R42 LIGHTHEADEDNESS: ICD-10-CM

## 2025-04-23 DIAGNOSIS — R00.1 BRADYCARDIA: ICD-10-CM

## 2025-04-23 LAB

## 2025-04-23 PROCEDURE — 93225 XTRNL ECG REC<48 HRS REC: CPT

## 2025-04-23 PROCEDURE — 93226 XTRNL ECG REC<48 HR SCAN A/R: CPT

## 2025-04-25 ENCOUNTER — RESULTS FOLLOW-UP (OUTPATIENT)
Dept: NON INVASIVE DIAGNOSTICS | Facility: HOSPITAL | Age: 85
End: 2025-04-25

## 2025-04-28 DIAGNOSIS — R00.1 BRADYCARDIA: ICD-10-CM

## 2025-04-28 DIAGNOSIS — R94.31 HOLTER MONITOR, ABNORMAL: Primary | ICD-10-CM

## 2025-04-28 PROCEDURE — 93227 XTRNL ECG REC<48 HR R&I: CPT | Performed by: INTERNAL MEDICINE

## 2025-04-28 NOTE — TELEPHONE ENCOUNTER
JONATHAN and sent Roambi message regarding Dr. Emanuel message below -  Please call the patient regarding his abnormal result.  EP evaluation questionable need of pacemaker

## 2025-04-30 ENCOUNTER — OFFICE VISIT (OUTPATIENT)
Dept: CARDIOLOGY CLINIC | Facility: CLINIC | Age: 85
End: 2025-04-30
Payer: MEDICARE

## 2025-04-30 VITALS
HEART RATE: 44 BPM | DIASTOLIC BLOOD PRESSURE: 62 MMHG | BODY MASS INDEX: 27.46 KG/M2 | WEIGHT: 214 LBS | SYSTOLIC BLOOD PRESSURE: 112 MMHG | HEIGHT: 74 IN

## 2025-04-30 DIAGNOSIS — I49.8 BIGEMINY: ICD-10-CM

## 2025-04-30 DIAGNOSIS — R94.31 HOLTER MONITOR, ABNORMAL: ICD-10-CM

## 2025-04-30 DIAGNOSIS — R00.1 BRADYCARDIA: Primary | ICD-10-CM

## 2025-04-30 DIAGNOSIS — I49.5 SSS (SICK SINUS SYNDROME) (HCC): ICD-10-CM

## 2025-04-30 DIAGNOSIS — R00.1 SINUS BRADYCARDIA: ICD-10-CM

## 2025-04-30 DIAGNOSIS — N18.31 STAGE 3A CHRONIC KIDNEY DISEASE (HCC): ICD-10-CM

## 2025-04-30 DIAGNOSIS — I49.3 PVC (PREMATURE VENTRICULAR CONTRACTION): ICD-10-CM

## 2025-04-30 DIAGNOSIS — I25.84 CORONARY ARTERY DISEASE DUE TO CALCIFIED CORONARY LESION: ICD-10-CM

## 2025-04-30 DIAGNOSIS — I25.10 CORONARY ARTERY DISEASE DUE TO CALCIFIED CORONARY LESION: ICD-10-CM

## 2025-04-30 LAB
ATRIAL RATE: 44 BPM
DME PARACHUTE DELIVERY DATE ACTUAL: NORMAL
DME PARACHUTE DELIVERY DATE EXPECTED: NORMAL
DME PARACHUTE DELIVERY DATE REQUESTED: NORMAL
DME PARACHUTE ITEM DESCRIPTION: NORMAL
DME PARACHUTE ORDER STATUS: NORMAL
DME PARACHUTE SUPPLIER NAME: NORMAL
DME PARACHUTE SUPPLIER PHONE: NORMAL
P AXIS: 54 DEGREES
PR INTERVAL: 202 MS
QRS AXIS: -3 DEGREES
QRSD INTERVAL: 98 MS
QT INTERVAL: 456 MS
QTC INTERVAL: 390 MS
T WAVE AXIS: -1 DEGREES
VENTRICULAR RATE: 44 BPM

## 2025-04-30 PROCEDURE — 93000 ELECTROCARDIOGRAM COMPLETE: CPT | Performed by: INTERNAL MEDICINE

## 2025-04-30 PROCEDURE — 99204 OFFICE O/P NEW MOD 45 MIN: CPT | Performed by: INTERNAL MEDICINE

## 2025-05-02 ENCOUNTER — TELEPHONE (OUTPATIENT)
Dept: CARDIOLOGY CLINIC | Facility: CLINIC | Age: 85
End: 2025-05-02

## 2025-05-02 ENCOUNTER — PREP FOR PROCEDURE (OUTPATIENT)
Dept: CARDIOLOGY CLINIC | Facility: CLINIC | Age: 85
End: 2025-05-02

## 2025-05-02 DIAGNOSIS — R00.1 BRADYCARDIA: Primary | ICD-10-CM

## 2025-05-02 NOTE — TELEPHONE ENCOUNTER
Patient scheduled for   Aveir implant   at \[facility]\"" on  5/8/25   with Dr De La Cruz.  Patient also scheduled to have an ECHO test on 05/07/2025 per Dr De La Cruz.  Patient aware of general instructions, labs test required.   Meds holds:   Omega 3 to stop immediately.

## 2025-05-02 NOTE — TELEPHONE ENCOUNTER
----- Message from Blair De La Cruz DO sent at 4/30/2025  5:06 PM EDT -----  Can we try to squeeze this patient in for an atrial avier.  Echo prior. We can even do limited am of procedure. Thanks.

## 2025-05-07 ENCOUNTER — HOSPITAL ENCOUNTER (OUTPATIENT)
Dept: NON INVASIVE DIAGNOSTICS | Facility: CLINIC | Age: 85
Discharge: HOME | End: 2025-05-07
Payer: MEDICARE

## 2025-05-07 ENCOUNTER — TELEPHONE (OUTPATIENT)
Dept: CARDIOLOGY CLINIC | Facility: CLINIC | Age: 85
End: 2025-05-07

## 2025-05-07 VITALS
SYSTOLIC BLOOD PRESSURE: 112 MMHG | WEIGHT: 214 LBS | HEIGHT: 74 IN | DIASTOLIC BLOOD PRESSURE: 62 MMHG | HEART RATE: 42 BPM | BODY MASS INDEX: 27.46 KG/M2

## 2025-05-07 DIAGNOSIS — I25.10 CORONARY ARTERY DISEASE DUE TO CALCIFIED CORONARY LESION: ICD-10-CM

## 2025-05-07 DIAGNOSIS — I25.84 CORONARY ARTERY DISEASE DUE TO CALCIFIED CORONARY LESION: ICD-10-CM

## 2025-05-07 DIAGNOSIS — R00.1 BRADYCARDIA: ICD-10-CM

## 2025-05-07 DIAGNOSIS — I49.3 PVC (PREMATURE VENTRICULAR CONTRACTION): ICD-10-CM

## 2025-05-07 LAB
AORTIC ROOT: 3 CM
ASCENDING AORTA: 3.5 CM
BSA FOR ECHO PROCEDURE: 2.24 M2
E WAVE DECELERATION TIME: 153 MS
E/A RATIO: 2.03
FRACTIONAL SHORTENING: 35 (ref 28–44)
INTERVENTRICULAR SEPTUM IN DIASTOLE (PARASTERNAL SHORT AXIS VIEW): 0.9 CM
INTERVENTRICULAR SEPTUM: 0.9 CM (ref 0.6–1.1)
LAAS-AP2: 22.8 CM2
LAAS-AP4: 21.1 CM2
LEFT ATRIUM SIZE: 4.3 CM
LEFT ATRIUM VOLUME (MOD BIPLANE): 61 ML
LEFT ATRIUM VOLUME INDEX (MOD BIPLANE): 27.2 ML/M2
LEFT INTERNAL DIMENSION IN SYSTOLE: 3.2 CM (ref 2.1–4)
LEFT VENTRICULAR INTERNAL DIMENSION IN DIASTOLE: 4.9 CM (ref 3.5–6)
LEFT VENTRICULAR POSTERIOR WALL IN END DIASTOLE: 1.1 CM
LEFT VENTRICULAR STROKE VOLUME: 73 ML
LV EF US.2D.A4C+ESTIMATED: 61 %
LVSV (TEICH): 73 ML
MV E'TISSUE VEL-SEP: 10 CM/S
MV PEAK A VEL: 0.37 M/S
MV PEAK E VEL: 75 CM/S
MV STENOSIS PRESSURE HALF TIME: 44 MS
MV VALVE AREA P 1/2 METHOD: 5
RA PRESSURE ESTIMATED: 10 MMHG
RIGHT ATRIUM AREA SYSTOLE A4C: 23 CM2
RIGHT VENTRICLE ID DIMENSION: 4.1 CM
RV PSP: 39 MMHG
SL CV LEFT ATRIUM LENGTH A2C: 6.5 CM
SL CV LV EF: 55
SL CV PED ECHO LEFT VENTRICLE DIASTOLIC VOLUME (MOD BIPLANE) 2D: 114 ML
SL CV PED ECHO LEFT VENTRICLE SYSTOLIC VOLUME (MOD BIPLANE) 2D: 41 ML
TR MAX PG: 29 MMHG
TR PEAK VELOCITY: 2.7 M/S
TRICUSPID ANNULAR PLANE SYSTOLIC EXCURSION: 2.3 CM
TRICUSPID VALVE PEAK REGURGITATION VELOCITY: 2.7 M/S

## 2025-05-07 PROCEDURE — 93306 TTE W/DOPPLER COMPLETE: CPT

## 2025-05-07 PROCEDURE — 93306 TTE W/DOPPLER COMPLETE: CPT | Performed by: INTERNAL MEDICINE

## 2025-05-07 NOTE — TELEPHONE ENCOUNTER
Received a call from patient stating that he is going to the dentist today at 12 pm after a tooth extraction and now he is having a hole on his gum that is making the food to get stock there, probably he will get it suture today, if so, he will ask the dentist for Lidocaine which he wants to confirm with us if he is good to do that and I not going to interfere his pacer implant procedure for tomorrow?     Please advise.

## 2025-05-08 ENCOUNTER — ANESTHESIA EVENT (OUTPATIENT)
Dept: NON INVASIVE DIAGNOSTICS | Facility: HOSPITAL | Age: 85
End: 2025-05-08
Payer: MEDICARE

## 2025-05-08 ENCOUNTER — APPOINTMENT (OUTPATIENT)
Dept: RADIOLOGY | Facility: HOSPITAL | Age: 85
End: 2025-05-08
Attending: PHYSICIAN ASSISTANT
Payer: MEDICARE

## 2025-05-08 ENCOUNTER — HOSPITAL ENCOUNTER (OUTPATIENT)
Facility: HOSPITAL | Age: 85
Setting detail: OUTPATIENT SURGERY
Discharge: HOME | End: 2025-05-08
Attending: INTERNAL MEDICINE | Admitting: INTERNAL MEDICINE
Payer: MEDICARE

## 2025-05-08 ENCOUNTER — ANESTHESIA (OUTPATIENT)
Dept: NON INVASIVE DIAGNOSTICS | Facility: HOSPITAL | Age: 85
End: 2025-05-08
Payer: MEDICARE

## 2025-05-08 VITALS
HEIGHT: 74 IN | DIASTOLIC BLOOD PRESSURE: 67 MMHG | OXYGEN SATURATION: 96 % | TEMPERATURE: 97.5 F | RESPIRATION RATE: 16 BRPM | WEIGHT: 205 LBS | SYSTOLIC BLOOD PRESSURE: 127 MMHG | HEART RATE: 67 BPM | BODY MASS INDEX: 26.31 KG/M2

## 2025-05-08 DIAGNOSIS — R00.1 BRADYCARDIA: ICD-10-CM

## 2025-05-08 PROBLEM — Z95.0 STATUS POST PLACEMENT OF CARDIAC PACEMAKER: Status: ACTIVE | Noted: 2025-05-08

## 2025-05-08 LAB
ANION GAP SERPL CALCULATED.3IONS-SCNC: 8 MMOL/L (ref 4–13)
ATRIAL RATE: 65 BPM
ATRIAL RATE: 69 BPM
BUN SERPL-MCNC: 24 MG/DL (ref 5–25)
CALCIUM SERPL-MCNC: 9.7 MG/DL (ref 8.4–10.2)
CHLORIDE SERPL-SCNC: 107 MMOL/L (ref 96–108)
CO2 SERPL-SCNC: 26 MMOL/L (ref 21–32)
CREAT SERPL-MCNC: 1.17 MG/DL (ref 0.6–1.3)
ERYTHROCYTE [DISTWIDTH] IN BLOOD BY AUTOMATED COUNT: 13.7 % (ref 11.6–15.1)
GFR SERPL CREATININE-BSD FRML MDRD: 55 ML/MIN/1.73SQ M
GLUCOSE P FAST SERPL-MCNC: 90 MG/DL (ref 65–99)
GLUCOSE SERPL-MCNC: 90 MG/DL (ref 65–140)
HCT VFR BLD AUTO: 45 % (ref 36.5–49.3)
HGB BLD-MCNC: 14.7 G/DL (ref 12–17)
INR PPP: 0.94 (ref 0.85–1.19)
MCH RBC QN AUTO: 30.4 PG (ref 26.8–34.3)
MCHC RBC AUTO-ENTMCNC: 32.7 G/DL (ref 31.4–37.4)
MCV RBC AUTO: 93 FL (ref 82–98)
P AXIS: 55 DEGREES
PLATELET # BLD AUTO: 199 THOUSANDS/UL (ref 149–390)
PMV BLD AUTO: 9.8 FL (ref 8.9–12.7)
POTASSIUM SERPL-SCNC: 3.7 MMOL/L (ref 3.5–5.3)
PR INTERVAL: 292 MS
PROTHROMBIN TIME: 12.9 SECONDS (ref 12.3–15)
QRS AXIS: -15 DEGREES
QRS AXIS: -9 DEGREES
QRSD INTERVAL: 92 MS
QRSD INTERVAL: 98 MS
QT INTERVAL: 392 MS
QT INTERVAL: 434 MS
QTC INTERVAL: 420 MS
QTC INTERVAL: 452 MS
RBC # BLD AUTO: 4.83 MILLION/UL (ref 3.88–5.62)
SODIUM SERPL-SCNC: 141 MMOL/L (ref 135–147)
T WAVE AXIS: 14 DEGREES
T WAVE AXIS: 6 DEGREES
VENTRICULAR RATE: 65 BPM
VENTRICULAR RATE: 69 BPM
WBC # BLD AUTO: 6.55 THOUSAND/UL (ref 4.31–10.16)

## 2025-05-08 PROCEDURE — C1786 PMKR, SINGLE, RATE-RESP: HCPCS | Performed by: INTERNAL MEDICINE

## 2025-05-08 PROCEDURE — 85027 COMPLETE CBC AUTOMATED: CPT | Performed by: PHYSICIAN ASSISTANT

## 2025-05-08 PROCEDURE — 93005 ELECTROCARDIOGRAM TRACING: CPT

## 2025-05-08 PROCEDURE — NC001 PR NO CHARGE: Performed by: PHYSICIAN ASSISTANT

## 2025-05-08 PROCEDURE — 93010 ELECTROCARDIOGRAM REPORT: CPT | Performed by: INTERNAL MEDICINE

## 2025-05-08 PROCEDURE — C1894 INTRO/SHEATH, NON-LASER: HCPCS | Performed by: INTERNAL MEDICINE

## 2025-05-08 PROCEDURE — 0823T TCAT INS 1CHMBR LDLS PM RA: CPT | Performed by: INTERNAL MEDICINE

## 2025-05-08 PROCEDURE — C1760 CLOSURE DEV, VASC: HCPCS | Performed by: INTERNAL MEDICINE

## 2025-05-08 PROCEDURE — C1769 GUIDE WIRE: HCPCS | Performed by: INTERNAL MEDICINE

## 2025-05-08 PROCEDURE — 76937 US GUIDE VASCULAR ACCESS: CPT | Performed by: INTERNAL MEDICINE

## 2025-05-08 PROCEDURE — C1887 CATHETER, GUIDING: HCPCS | Performed by: INTERNAL MEDICINE

## 2025-05-08 PROCEDURE — 80048 BASIC METABOLIC PNL TOTAL CA: CPT | Performed by: PHYSICIAN ASSISTANT

## 2025-05-08 PROCEDURE — 33274 TCAT INSJ/RPL PERM LDLS PM: CPT | Performed by: INTERNAL MEDICINE

## 2025-05-08 PROCEDURE — 71045 X-RAY EXAM CHEST 1 VIEW: CPT

## 2025-05-08 PROCEDURE — 85610 PROTHROMBIN TIME: CPT | Performed by: PHYSICIAN ASSISTANT

## 2025-05-08 DEVICE — PERCLOSE™ PROSTYLE™ SUTURE-MEDIATED CLOSURE AND REPAIR SYSTEM
Type: IMPLANTABLE DEVICE | Site: GROIN | Status: FUNCTIONAL
Brand: PERCLOSE™ PROSTYLE™

## 2025-05-08 DEVICE — LEADLESS PACEMAKER
Type: IMPLANTABLE DEVICE | Site: HEART | Status: FUNCTIONAL
Brand: AVEIR™

## 2025-05-08 RX ORDER — SODIUM CHLORIDE 9 MG/ML
INJECTION, SOLUTION INTRAVENOUS CONTINUOUS PRN
Status: DISCONTINUED | OUTPATIENT
Start: 2025-05-08 | End: 2025-05-08

## 2025-05-08 RX ORDER — ACETAMINOPHEN 325 MG/1
975 TABLET ORAL EVERY 6 HOURS PRN
Status: DISCONTINUED | OUTPATIENT
Start: 2025-05-08 | End: 2025-05-08 | Stop reason: HOSPADM

## 2025-05-08 RX ORDER — CEFAZOLIN SODIUM 2 G/50ML
2000 SOLUTION INTRAVENOUS ONCE
Status: COMPLETED | OUTPATIENT
Start: 2025-05-08 | End: 2025-05-08

## 2025-05-08 RX ORDER — PHENYLEPHRINE HCL IN 0.9% NACL 1 MG/10 ML
SYRINGE (ML) INTRAVENOUS AS NEEDED
Status: DISCONTINUED | OUTPATIENT
Start: 2025-05-08 | End: 2025-05-08

## 2025-05-08 RX ORDER — PROPOFOL 10 MG/ML
INJECTION, EMULSION INTRAVENOUS CONTINUOUS PRN
Status: DISCONTINUED | OUTPATIENT
Start: 2025-05-08 | End: 2025-05-08

## 2025-05-08 RX ORDER — HEPARIN SODIUM 1000 [USP'U]/ML
INJECTION, SOLUTION INTRAVENOUS; SUBCUTANEOUS CODE/TRAUMA/SEDATION MEDICATION
Status: DISCONTINUED | OUTPATIENT
Start: 2025-05-08 | End: 2025-05-08 | Stop reason: HOSPADM

## 2025-05-08 RX ORDER — SODIUM CHLORIDE, SODIUM LACTATE, POTASSIUM CHLORIDE, CALCIUM CHLORIDE 600; 310; 30; 20 MG/100ML; MG/100ML; MG/100ML; MG/100ML
20 INJECTION, SOLUTION INTRAVENOUS CONTINUOUS
Status: DISCONTINUED | OUTPATIENT
Start: 2025-05-08 | End: 2025-05-08

## 2025-05-08 RX ORDER — FENTANYL CITRATE 50 UG/ML
INJECTION, SOLUTION INTRAMUSCULAR; INTRAVENOUS AS NEEDED
Status: DISCONTINUED | OUTPATIENT
Start: 2025-05-08 | End: 2025-05-08

## 2025-05-08 RX ORDER — SODIUM CHLORIDE 9 MG/ML
20 INJECTION, SOLUTION INTRAVENOUS CONTINUOUS
Status: DISCONTINUED | OUTPATIENT
Start: 2025-05-08 | End: 2025-05-08 | Stop reason: HOSPADM

## 2025-05-08 RX ORDER — LIDOCAINE HYDROCHLORIDE 10 MG/ML
INJECTION, SOLUTION EPIDURAL; INFILTRATION; INTRACAUDAL; PERINEURAL CODE/TRAUMA/SEDATION MEDICATION
Status: DISCONTINUED | OUTPATIENT
Start: 2025-05-08 | End: 2025-05-08 | Stop reason: HOSPADM

## 2025-05-08 RX ADMIN — Medication 200 MCG: at 13:41

## 2025-05-08 RX ADMIN — CEFAZOLIN SODIUM 2000 MG: 2 SOLUTION INTRAVENOUS at 13:06

## 2025-05-08 RX ADMIN — SODIUM CHLORIDE: 0.9 INJECTION, SOLUTION INTRAVENOUS at 12:52

## 2025-05-08 RX ADMIN — SODIUM CHLORIDE 20 ML/HR: 0.9 INJECTION, SOLUTION INTRAVENOUS at 11:14

## 2025-05-08 RX ADMIN — FENTANYL CITRATE 50 MCG: 50 INJECTION INTRAMUSCULAR; INTRAVENOUS at 13:03

## 2025-05-08 RX ADMIN — PROPOFOL 80 MCG/KG/MIN: 10 INJECTION, EMULSION INTRAVENOUS at 13:05

## 2025-05-08 RX ADMIN — FENTANYL CITRATE 50 MCG: 50 INJECTION INTRAMUSCULAR; INTRAVENOUS at 13:05

## 2025-05-08 RX ADMIN — PHENYLEPHRINE HYDROCHLORIDE 40 MCG/MIN: 10 INJECTION INTRAVENOUS at 13:30

## 2025-05-08 NOTE — Clinical Note
Defib pad site: left lower flank.Defib pad site: Right Upper Chest.Defib pad site assessment: skin integrity intact.

## 2025-05-08 NOTE — H&P
H&P Exam - Electrophysiology - Cardiology   Kelvin Parson II 85 y.o. male MRN: [id number]  Unit/Bed#: BE CATH LAB ROOM Encounter: [id number]    Assessment & Plan     Assessment & Plan  SSS (sick sinus syndrome) (HCC)  Patient does have sinus rhythm but heart rates in the 30s and 40s symptomatic with dizziness  Preserved LVEF per echo yesterday  Not on rate control medication that can be discontinued to improve symptoms    Patient presents today to undergo leadless pacemaker implantation.  It does appear that  is choosing atrial leadless pacemaker given intact AV conduction.  Paroxysmal atrial fibrillation (HCC)    Status post radiofrequency ablation (RFA) operation for arrhythmia      History of Present Illness   HPI:  Kelvin Parson II is a 85 y.o. year old male with sick sinus syndrome, paroxysmal atrial fibrillation not maintained on anticoagulation with prior ablation, coronary artery disease with PTCA/drug stent of RCA, and ventricular ectopy.    He follows with Dr. Herrera primarily and has had prior echoes that have showed preserved LVEF.  Holter monitors in the past have shown fairly small burden of ventricular ectopy.  However, more recently patient began to have dizziness for which it was felt that he would require EP evaluation given slow heart rates.    He was seen by Dr. De La Cruz in the office on 4/30 at which point it was felt that he would benefit from pacemaker.  Please refer to 's full note from 4/30 for further detail.    EKG:       ECHO 5/2025:    Left Ventricle: Left ventricular cavity size is normal. Wall thickness is normal. The left ventricular ejection fraction is 55%. Systolic function is normal. Wall motion is normal. Diastolic function is normal.    Right Ventricle: Right ventricular cavity size is normal. Systolic function is normal.    Left Atrium: The atrium is mildly dilated.    Right Atrium: The atrium is mildly dilated.    Aortic Valve: There is mild  regurgitation.    Mitral Valve: There is trace regurgitation.    Tricuspid Valve: There is mild regurgitation.    Prior TTE study available for comparison. Prior study date: 2/10/2021. No significant changes noted compared to the prior study.    HOLTER 2025:  1.  Patient was in normal sinus rhythm  2.  No sustained arrhythmia  3.  Ventricular ectopy: 3316 (4.5 % of total beats)  4. 1 episode of 13 beat non sustained ventricular tachycardia  5. Diary submitted: reports lightheadedness during a walk, however correlation with tracing at this time did not reveal significant arrhythmia.    Review of Systems  ROS as noted above, otherwise 12 point review of systems was performed and is negative.     Historical Information   Past Medical History:   Diagnosis Date    Bladder cancer (HCC)     Cancer (HCC) Bladder     Chronic kidney disease     Coronary artery disease     HL (hearing loss) 2018    Irregular heart beat     Afib     Past Surgical History:   Procedure Laterality Date    CARDIAC SURGERY      Afib ablation    CATARACT EXTRACTION, BILATERAL Bilateral [id number]    NE CYSTO INSERTION TRANSPROSTATIC IMPLANT SINGLE N/A 2021    Procedure: CYSTOSCOPY WITH INSERTION UROLIFT;  Surgeon: Jeffery Law MD;  Location: [facility];  Service: Urology    PROSTATE SURGERY  Uro-lift      Family History:   Family History   Problem Relation Age of Onset    Pancreatic cancer Mother     Cancer Mother         Bladder /liver    Liver cancer Father     Stroke Father          from stroke    Cancer Father         Pancreas    Cancer Brother        Social History   Social History     Substance and Sexual Activity   Alcohol Use Yes    Alcohol/week: 5.0 standard drinks of alcohol    Types: 5 Glasses of wine per week    Comment: occasional wine     Social History     Substance and Sexual Activity   Drug Use Never     Social History     Tobacco Use   Smoking Status Former    Current packs/day: 0.00     "Average packs/day: 0.3 packs/day for 15.0 years (3.8 ttl pk-yrs)    Types: Cigarettes    Start date: 10/8/1961    Quit date: 10/8/1977    Years since quittin.6   Smokeless Tobacco Never       Meds/Allergies   all medications and allergies reviewed  Home Medications:   Medications Prior to Admission:     aspirin (ECOTRIN LOW STRENGTH) 81 mg EC tablet    atorvastatin (LIPITOR) 20 mg tablet    cholecalciferol (VITAMIN D3) 1,000 units tablet    Coenzyme Q10 (CoQ10) 100 MG CAPS    cyanocobalamin (VITAMIN B-12) 100 mcg tablet    Glucosamine-Chondroit-Vit C-Mn (GLUCOSAMINE CHONDR 1500 COMPLX PO)    metroNIDAZOLE (METROGEL) 0.75 % gel    Multiple Vitamin (multivitamin) capsule    Omega 3 1000 MG CAPS    tamsulosin (FLOMAX) 0.4 mg    Allergies   Allergen Reactions    Sulfa Antibiotics Hives       Objective   Vitals: Blood pressure (!) 174/65, pulse 67, temperature 97.5 °F (36.4 °C), temperature source Temporal, resp. rate 18, height 6' 2\" (1.88 m), weight 93 kg (205 lb), SpO2 98%.  Orthostatic Blood Pressures      Flowsheet Row Most Recent Value   Blood Pressure 174/65 filed at 2025 1118            No intake or output data in the 24 hours ending 25 1255    Invasive Devices       Peripheral Intravenous Line  Duration             Peripheral IV 25 Left;Proximal;Ventral (anterior) Forearm <1 day                    Physical Exam   GEN: NAD, alert and oriented, well appearing  SKIN: dry without significant lesions or rashes  HEENT: NCAT, PERRL, EOMs intact  NECK: No JVD appreciated  CARDIOVASCULAR: regular, bradycardic  LUNGS: Good respiratory effort with no audible wheezes  PSYCH: Normal mood and affect  NEURO: CN ll-Xll grossly intact      Lab Results: I have personally reviewed pertinent lab results.    Results from last 7 days   Lab Units 25  1107   WBC Thousand/uL 6.55   HEMOGLOBIN g/dL 14.7   HEMATOCRIT % 45.0   PLATELETS Thousands/uL 199     Results from last 7 days   Lab Units 25  1107 "   POTASSIUM mmol/L 3.7   CHLORIDE mmol/L 107   CO2 mmol/L 26   BUN mg/dL 24   CREATININE mg/dL 1.17   CALCIUM mg/dL 9.7     Results from last 7 days   Lab Units 25  1107   INR  0.94             Imaging: Results Review Statement: No pertinent imaging studies reviewed.  Results for orders placed during the hospital encounter of 02/10/21    Echo complete with contrast if indicated    [place] [address]  [phone]    Transthoracic Echocardiogram  2D, M-mode, Doppler, and Color Doppler    Study date:  10-Feb-2021    Patient: ALKA ANDERSON  MR number: [id number]  Account number: [id number]  : 1940  Age: 80 years  Gender: Male  Status: Outpatient  Location: [address] [place] [address]  Height: 73 in  Weight: 203 lb  BP: 138/ 62 mmHg    Indications: Coronary artery disease    Diagnoses: I25.10 - Atherosclerotic heart disease of native coronary artery without angina pectoris    Sonographer:  TERRENCE Cardenas  Primary Physician:  Alfonzo Jugn MD  Referring Physician:  Rober Herrera MD  Group:  [facility]  Cardiology Fellow:  Pravin Hope MD  Interpreting Physician:  Judith Lopez MD    SUMMARY    LEFT VENTRICLE:  Systolic function was normal. Ejection fraction was estimated to be 65 %.  There were no regional wall motion abnormalities.    AORTIC VALVE:  There was mild regurgitation.    TRICUSPID VALVE:  There was trace regurgitation.    HISTORY: PRIOR HISTORY: High cholesterol, coronary artery disease, paroxysmal atrial fibrillation, premature ventricular contractions, bradycardia, bigeminy, former smoker, chronic kidney disease    PROCEDURE: The study was performed in the [address] [place] [address]. This was a routine study. The transthoracic approach was used. The study included complete 2D imaging, M-mode, complete spectral Doppler, and color Doppler. Image  quality was adequate.    LEFT  VENTRICLE: Size was normal. Systolic function was normal. Ejection fraction was estimated to be 65 %. There were no regional wall motion abnormalities. Wall thickness was normal. DOPPLER: Left ventricular diastolic function parameters  were normal.    RIGHT VENTRICLE: The size was normal. Systolic function was normal. Wall thickness was normal.    LEFT ATRIUM: Size was normal.    RIGHT ATRIUM: Size was normal.    MITRAL VALVE: Valve structure was normal. There was normal leaflet separation. DOPPLER: The transmitral velocity was within the normal range. There was no evidence for stenosis. There was no regurgitation.    AORTIC VALVE: The valve was trileaflet. Leaflets exhibited normal thickness and normal cuspal separation. DOPPLER: Transaortic velocity was within the normal range. There was no evidence for stenosis. There was mild regurgitation.    TRICUSPID VALVE: The valve structure was normal. There was normal leaflet separation. DOPPLER: The transtricuspid velocity was within the normal range. There was no evidence for stenosis. There was trace regurgitation. Estimated peak PA  pressure was 33 mmHg.    PULMONIC VALVE: Leaflets exhibited normal thickness, no calcification, and normal cuspal separation. DOPPLER: The transpulmonic velocity was within the normal range. There was trace regurgitation.    PERICARDIUM: There was no pericardial effusion. The pericardium was normal in appearance.    AORTA: The root exhibited normal size.    SYSTEM MEASUREMENT TABLES    2D  %FS: 28.31 %  Ao Diam: 3.64 cm  EDV(Teich): 103.04 ml  EF(Teich): 54.65 %  ESV(Teich): 46.73 ml  IVSd: 1.05 cm  LA Area: 19.25 cm2  LA Diam: 3.79 cm  LVEDV MOD A4C: 121.39 ml  LVEF MOD A4C: 68.97 %  LVESV MOD A4C: 37.67 ml  LVIDd: 4.71 cm  LVIDs: 3.38 cm  LVLd A4C: 8.65 cm  LVLs A4C: 6.85 cm  LVPWd: 1.08 cm  RA Area: 20.45 cm2  RVIDd: 3.63 cm  SV MOD A4C: 83.73 ml  SV(Teich): 56.31 ml    CW  TR Vmax: 2.72 m/s  TR maxP.5 mmHg    MM  TAPSE: 2.23  cm    PW  E' Sept: 0.08 m/s  E/E' Sept: 10.66  MV A Jaguar: 0.4 m/s  MV Dec Amelia: 4.93 m/s2  MV DecT: 166.8 ms  MV E Jaguar: 0.82 m/s  MV E/A Ratio: 2.05  MV PHT: 48.37 ms  MVA By PHT: 4.55 cm2    [facility]    Prepared and electronically signed by    Judith Lopez MD  Signed 10-Feb-2021 11:00:30      Code Status: Level 1 - Full Code    ** Please Note: Dictation voice to text software may have been used in the creation of this document. **

## 2025-05-08 NOTE — ANESTHESIA POSTPROCEDURE EVALUATION
Post-Op Assessment Note    CV Status:  Stable    Pain management: adequate       Mental Status:  Alert and awake   Hydration Status:  Euvolemic   PONV Controlled:  Controlled   Airway Patency:  Patent     Post Op Vitals Reviewed: Yes    No anethesia notable event occurred.    Staff: Anesthesiologist, CRNA           Last Filed PACU Vitals:  Vitals Value Taken Time   Temp     Pulse 61    /58    Resp 16    SpO2 97

## 2025-05-08 NOTE — ANESTHESIA PREPROCEDURE EVALUATION
Procedure:  Cardiac leadless pacer implant AVEIR (Chest)    Relevant Problems   ANESTHESIA (within normal limits)      CARDIO   (+) Bigeminy   (+) Coronary artery disease due to calcified coronary lesion   (+) PVC (premature ventricular contraction)   (+) Paroxysmal atrial fibrillation (HCC) (S/p ablation 2014)   (+) Pure hypercholesterolemia   (+) Rib pain on left side   (+) SSS (sick sinus syndrome) (HCC)   (+) Sinus bradycardia      ENDO (within normal limits)      GI/HEPATIC (within normal limits)   (-) Gastroesophageal reflux disease      /RENAL   (+) Benign prostatic hyperplasia with lower urinary tract symptoms   (+) Stage 3a chronic kidney disease (HCC)      HEMATOLOGY (within normal limits)      MUSCULOSKELETAL (within normal limits)      NEURO/PSYCH (within normal limits)      PULMONARY   (+) LIOR (obstructive sleep apnea) (cpap)   (-) URI (upper respiratory infection)      5/7/25  History    CAD. LIOR. Sinus bradycardia. PVC. Bigeminy. Paroxysmal atrial fibrillation.     Interpretation Summary         Left Ventricle: Left ventricular cavity size is normal. Wall thickness is normal. The left ventricular ejection fraction is 55%. Systolic function is normal. Wall motion is normal. Diastolic function is normal.    Right Ventricle: Right ventricular cavity size is normal. Systolic function is normal.    Left Atrium: The atrium is mildly dilated.    Right Atrium: The atrium is mildly dilated.    Aortic Valve: There is mild regurgitation.    Mitral Valve: There is trace regurgitation.    Tricuspid Valve: There is mild regurgitation.    Prior TTE study available for comparison. Prior study date: 2/10/2021. No significant changes noted compared to the prior study.    Physical Exam    Airway    Mallampati score: IV  TM Distance: >3 FB  Neck ROM: full     Dental   No notable dental hx     Cardiovascular  Rhythm: regular, Rate: normal, No peripheral edema    Pulmonary   Breath sounds clear to auscultation    Other  Findings        Anesthesia Plan  ASA Score- 3     Anesthesia Type- IV sedation with anesthesia with ASA Monitors.         Additional Monitors:     Airway Plan:            Plan Factors-Exercise tolerance (METS): >4 METS.    Chart reviewed. EKG reviewed. Imaging results reviewed. Existing labs reviewed. Patient summary reviewed.                  Induction-     Postoperative Plan-     Perioperative Resuscitation Plan - Level 1 - Full Code.       Informed Consent-       NPO Status:  Vitals Value Taken Time   Date of last liquid 05/07/25 05/08/25 1105   Time of last liquid 1930 05/08/25 1105   Date of last solid 05/07/25 05/08/25 1105   Time of last solid 1930 05/08/25 1105

## 2025-05-08 NOTE — DISCHARGE INSTR - AVS FIRST PAGE
No heavy lifting or strenuous activity for one week.    No soaking in a bath tub/hot tub/swimming pool for one week or until groin heals.    If you notice ongoing bleeding, swelling, or firm lumps in groin incision, please contact Dr. De La Cruz's office - [phone]. There may be a pea-sized lump at your incision site, this is normal.     Please refer to post pacemaker implantation discharge instructions and restrictions and your pacemaker booklet/temporary card.     AFTER PACEMAKER CARE:    If you have any questions, please call [phone] to speak with a nurse (8:30am-4pm, or [phone] after hours). For appointments, please call [phone].      WHAT YOU SHOULD KNOW:   Your pacemaker is a small, battery-powered device that was placed through a vein in your leg and implanted directly into your heart. It is a leadless pacemaker.  It helps regulate your heart rate and prevent your heart from beating too slowly.      AFTER YOU LEAVE:     Medicines:     Pain medicine:  You may need medicine to take away or decrease pain.     Learn how to take your medicine. Ask what medicine and how much you should take. Be sure you know how, when, and how often to take it. Usually Over the counter pain medicine is sufficient to control pain (Acetominophen or Ibuprofen) Ask your doctor if you may take these. If this does not control your pain, narcotic pain killers may be prescribed, please call if you need prescription.     Do not wait until the pain is severe before you take your medicine. Tell caregivers if your pain does not decrease.    Pain medicine can make you dizzy or sleepy. Prevent falls by calling someone when you get out of bed or if you need help.        Take your medicine as directed.  Call your healthcare provider if you think your medicine is not helping or if you have side effects. Tell him if you are allergic to any medicine.    Follow up with your cardiologist after your procedure:  You will need a follow-up  visit approximately 2 weeks after you leave the hospital. Your cardiologist will check your wound and make sure that your pacemaker is working correctly.     Follow the instructions to check your pacemaker:  Your cardiologist or primary healthcare provider will check your pacemaker and the battery regularly.  He will use a computer to check your pacemaker over the telephone or wireless device which will be given to you.     Pacemaker batteries usually last 5 to 10 years, at which time you would receive a new device similar to your procedure today.    Wound care:  No heavy lifting or strenuous activity for one week. No soaking in a bath tub/hot tub/swimming pool for one week or until groin heals.    Activity:   No heavy lifting or strenuous activity for 1 week to allow your groin incision to heal.  Otherwise, there are no arm restrictions.  Typically driving is allowed after 1 week post pacemaker if you are stable, however in some cases it may be longer and you should discuss with your doctor before proceeding.   Sports:  Ask your caregiver when it is okay to play tennis, golf, basketball, or any other activities. Ask your cardiologist or primary healthcare provider how much and what kinds of physical activity are safe for you.    Living with a pacemaker:   Tell all caregivers you have a pacemaker:  This includes surgeons, radiologists, and medical technicians. You may want to wear a medical alert ID bracelet or necklace that states that you have a pacemaker.    Carry your pacemaker ID card:  Make sure you receive a pacemaker ID card. Carry it with you at all times. It lists important information about your pacemaker. Show it to airport security if you travel.     Avoid electrical interference:  Avoid welding equipment and other equipment with large magnets or electric fields. These things could interfere with how your pacemaker works. Use your cell phone on the ear opposite from your pacemaker. Do not carry your  cell phone in your shirt pocket over your chest.     Some Pacemakers are MRI safe. Ask you doctor if it is safe to proceed with MRI and let the radiologist and staff know you have a pacemaker.       Contact your cardiologist or primary healthcare provider if:     The skin around your groin incision has increasing pain, redness, swelling, or has drainage. This may mean that you have an infection.     You have a fever.     You have chills, a cough, and feel weak or achy. These are also signs of infection.    Your feet or ankles are more swollen than your baseline.     Your Heart rate is less than 50 beats per minute     Seek care immediately if:   Your bandage becomes soaked with blood.     Your stitches open up.     You feel your heart suddenly beating very slowly or quickly.    You become too weak or dizzy to stand, or you pass out.     Your arm or leg feels warm, tender, and painful. It may look swollen and red.    You have chest pain that does not go away with rest or medicine.     You feel lightheaded, short of breath, and have chest pain.     You cough up blood.        © 2014 [employer] Information is for End User's use only and may not be sold, redistributed or otherwise used for commercial purposes. All illustrations and images included in CareNotes® are the copyrighted property of A.D.A[employer]M., Inc. or [employer].  The above information is an  only. It is not intended as medical advice for individual conditions or treatments. Talk to your doctor, nurse or pharmacist before following any medical regimen to see if it is safe and effective for you.

## 2025-05-08 NOTE — ASSESSMENT & PLAN NOTE
Patient does have sinus rhythm but heart rates in the 30s and 40s symptomatic with dizziness  Preserved LVEF per echo yesterday  Not on rate control medication that can be discontinued to improve symptoms    Patient presents today to undergo leadless pacemaker implantation.  It does appear that  is choosing atrial leadless pacemaker given intact AV conduction.

## 2025-05-09 NOTE — ANESTHESIA POSTPROCEDURE EVALUATION
Post-Op Assessment Note    CV Status:  Stable    Pain management: adequate       Mental Status:  Alert and awake   Hydration Status:  Euvolemic   PONV Controlled:  Controlled   Airway Patency:  Patent     Post Op Vitals Reviewed: Yes    No anethesia notable event occurred.    Staff: Anesthesiologist         Last Filed PACU Vitals:  Vitals Value Taken Time   Temp     Pulse 67 05/08/25 1515   /67 05/08/25 1515   Resp 16 05/08/25 1430   SpO2 96 % 05/08/25 1430

## 2025-05-11 ENCOUNTER — RESULTS FOLLOW-UP (OUTPATIENT)
Dept: CARDIOLOGY CLINIC | Facility: CLINIC | Age: 85
End: 2025-05-11

## 2025-05-21 ENCOUNTER — TELEPHONE (OUTPATIENT)
Age: 85
End: 2025-05-21

## 2025-05-21 NOTE — TELEPHONE ENCOUNTER
Pt under care of: Roger  Office Location: [place]    Insurance   Current Insurance? Medicare   Insurance E-verified? Yes    History  Last Seen (include Follow Up recommendations of last visit- see Office Visit - Instructions): 12/5/2024 - Follow up in 1 year.     Pt calling to request an appointment with John at [facility] or [facility] at [facility] due to nocturia and incontinence. Pt stated he will continue care with John moving forward as he originally established care with him.     Appointment Details   Date: 6/4/25    Time: 10:45am  Location: [facility]  Provider: Ezekiel Ken      Does the appointment need further review? No

## 2025-05-23 ENCOUNTER — IN-CLINIC DEVICE VISIT (OUTPATIENT)
Dept: CARDIOLOGY CLINIC | Facility: CLINIC | Age: 85
End: 2025-05-23

## 2025-05-23 ENCOUNTER — RESULTS FOLLOW-UP (OUTPATIENT)
Dept: CARDIOLOGY CLINIC | Facility: CLINIC | Age: 85
End: 2025-05-23

## 2025-05-23 DIAGNOSIS — Z95.0 PRESENCE OF CARDIAC PACEMAKER: Primary | ICD-10-CM

## 2025-05-23 NOTE — PROGRESS NOTES
Results for orders placed or performed in visit on 05/23/25   Cardiac EP device report    Narrative    HOWARD AVEIR PM/ ACTIVE SYSTEM IS MRI CONDITIONAL  DEVICE INTERROGATED IN THE [place] OFFICE W/ BANEGAS REPS (OBDULIO ALCANTAR). BATTERY VOLTAGE ADEQUATE (10.3 YR). AP 89%. ALL ELECTRODE PARAMETERS WITHIN NORMAL LIMITS. DECREASE MADE TO AMPLITUDE & PULSE WIDTH TO PROMOTE DEVICE LONGEVITY WHILE MAINTAINING AN APPROPRIATE SAFETY MARGIN. NORMAL DEVICE FUNCTION. [facility]

## 2025-05-25 NOTE — PROGRESS NOTES
EPS Consultation/New Patient Evaluation - Kelvin Parson II 85 y.o. male MRN: [id number]           ASSESSMENT:  1. Bradycardia  Ambulatory referral to Cardiac Electrophysiology    POCT ECG    Echo complete w/ contrast if indicated      2. Holter monitor, abnormal  Ambulatory referral to Cardiac Electrophysiology      3. Coronary artery disease due to calcified coronary lesion  Echo complete w/ contrast if indicated      4. Sinus bradycardia        5. PVC (premature ventricular contraction)  Echo complete w/ contrast if indicated      6. Bigeminy        7. SSS (sick sinus syndrome) ([facility])        8. Stage 3a chronic kidney disease ([facility])            My interpretation of holter.  Low average HR suggesting sick sinus syndrome. One episode nsvt.  PVCs present mostly unifocal    PLAN:  Sick sinus syndrome.  We discussed different options for pacemakers including leadless versus traditional pacemaker.  Jj was very optimistic with the possibility of the atrial AVEIR leadless pacemaker.  He understands and accepts the small risks related to PPM placement.  We will arrange this ASAP given that he feels poorly.  All questions answered.          CC/HPI:   .          Review of Systems   Constitutional: Positive for malaise/fatigue. Negative for chills and fever.   HENT:  Negative for congestion and sore throat.    Eyes:  Negative for blurred vision and double vision.   Cardiovascular:  Positive for dyspnea on exertion. Negative for chest pain, claudication, leg swelling, near-syncope, orthopnea, palpitations, paroxysmal nocturnal dyspnea and syncope.   Respiratory:  Negative for cough, shortness of breath, snoring and sputum production.    Hematologic/Lymphatic: Negative for bleeding problem. Does not bruise/bleed easily.   Skin:  Negative for itching and rash.   Musculoskeletal:  Negative for arthritis and joint pain.   Gastrointestinal:  Negative for abdominal pain, nausea and vomiting.   Genitourinary:  Negative for  "hematuria.   Neurological:  Negative for dizziness, focal weakness, headaches, light-headedness and weakness.   Psychiatric/Behavioral:  Negative for depression. The patient is not nervous/anxious.          Objective:     Vitals: Blood pressure 112/62, pulse (!) 44, height 6' 2\" (1.88 m), weight 97.1 kg (214 lb)., Body mass index is 27.48 kg/m².,        Physical Exam:    GEN: Kelvin Parson II appears well, alert and oriented x 3, pleasant and cooperative   HEENT: pupils equal, round, and reactive to light; extraocular muscles intact  NECK: supple, no carotid bruits   HEART: regular rhythm, normal S1 and S2, no murmurs, clicks, gallops or rubs   LUNGS: clear to auscultation bilaterally; no wheezes, rales, or rhonchi   ABDOMEN: normal bowel sounds, soft, no tenderness, no distention  EXTREMITIES: peripheral pulses normal; no clubbing, cyanosis, or edema  NEURO: no focal findings   SKIN: normal without suspicious lesions on exposed skin    Medications:    Current Medications[1]     Family History[2]  Social History     Socioeconomic History    Marital status:      Spouse name: Not on file    Number of children: Not on file    Years of education: Not on file    Highest education level: Not on file   Occupational History    Not on file   Tobacco Use    Smoking status: Former     Current packs/day: 0.00     Average packs/day: 0.3 packs/day for 15.0 years (3.8 ttl pk-yrs)     Types: Cigarettes     Start date: 10/8/1961     Quit date: 10/8/1977     Years since quittin.6    Smokeless tobacco: Never   Vaping Use    Vaping status: Never Used   Substance and Sexual Activity    Alcohol use: Yes     Alcohol/week: 5.0 standard drinks of alcohol     Types: 5 Glasses of wine per week     Comment: occasional wine    Drug use: Never    Sexual activity: Not Currently     Partners: Female     Birth control/protection: Condom Male     Comment: Wife on pill   Other Topics Concern    Not on file   Social History Narrative "    Not on file     Social Drivers of Health     Financial Resource Strain: Low Risk  (1/24/2023)    Received from [facility]    Overall Financial Resource Strain (CARDIA)     Difficulty of Paying Living Expenses: Not very hard   Food Insecurity: No Food Insecurity (1/24/2023)    Received from [facility]    Hunger Vital Sign     Within the past 12 months, you worried that your food would run out before you got the money to buy more.: Never true     Within the past 12 months, the food you bought just didn't last and you didn't have money to get more.: Never true   Transportation Needs: No Transportation Needs (1/24/2023)    Received from [facility]    PRAPARE - Transportation     Lack of Transportation (Medical): No     Lack of Transportation (Non-Medical): No   Physical Activity: Sufficiently Active (10/8/2020)    Exercise Vital Sign     Days of Exercise per Week: 7 days     Minutes of Exercise per Session: 60 min   Stress: Not on file   Social Connections: Not on file   Intimate Partner Violence: Not At Risk (1/24/2023)    Received from [facility]    Humiliation, Afraid, Rape, and Kick questionnaire     Fear of Current or Ex-Partner: No     Emotionally Abused: No     Physically Abused: No     Sexually Abused: No   Housing Stability: Low Risk  (1/24/2023)    Received from [facility]    Housing Stability Vital Sign     Unable to Pay for Housing in the Last Year: No     Number of Places Lived in the Last Year: 1     Unstable Housing in the Last Year: No     Tobacco Use History[3]  Social History     Substance and Sexual Activity   Alcohol Use Yes    Alcohol/week: 5.0 standard drinks of alcohol    Types: 5 Glasses of wine per week    Comment: occasional wine       Labs & Results:  Below is the patient's most recent value for Albumin, ALT, AST, BUN, Calcium, Chloride, Cholesterol, CO2, Creatinine, GFR, Glucose, HDL, Hematocrit,  Hemoglobin, Hemoglobin A1C, LDL, Magnesium, Phosphorus, Platelets, Potassium, PSA, Sodium, Triglycerides, and WBC.   Lab Results   Component Value Date    ALT 33 2023    AST 36 2023    BUN 24 2025    CALCIUM 9.7 2025     2025    CO2 26 2025    CREATININE 1.17 2025    HDL 43 2021    HCT 45.0 2025    HGB 14.7 2025    HGBA1C 5.7 2022    MG 2.0 2023     2025    K 3.7 2025    TRIG 52 2021    WBC 6.55 2025     Note: for a comprehensive list of the patient's lab results, access the Results Review activity.            Cardiac testing:   Results for orders placed during the hospital encounter of 02/10/21    Echo complete with contrast if indicated    [place] [address]  [phone]    Transthoracic Echocardiogram  2D, M-mode, Doppler, and Color Doppler    Study date:  10-Feb-2021    Patient: ALKA ANDERSON  MR number: [id number]  Account number: [id number]  : 1940  Age: 80 years  Gender: Male  Status: Outpatient  Location: [address] [place] [address] [facility]  Height: 73 in  Weight: 203 lb  BP: 138/ 62 mmHg    Indications: Coronary artery disease    Diagnoses: I25.10 - Atherosclerotic heart disease of native coronary artery without angina pectoris    Sonographer:  TERRENCE Cardenas  Primary Physician:  Alfonzo Jung MD  Referring Physician:  Rober Herrera MD  Group:  [facility]  Cardiology Fellow:  Pravin Hope MD  Interpreting Physician:  Judith Lopez MD    SUMMARY    LEFT VENTRICLE:  Systolic function was normal. Ejection fraction was estimated to be 65 %.  There were no regional wall motion abnormalities.    AORTIC VALVE:  There was mild regurgitation.    TRICUSPID VALVE:  There was trace regurgitation.    HISTORY: PRIOR HISTORY: High cholesterol, coronary artery disease, paroxysmal atrial fibrillation,  premature ventricular contractions, bradycardia, bigeminy, former smoker, chronic kidney disease    PROCEDURE: The study was performed in the [address] [place] [address] [facility]. This was a routine study. The transthoracic approach was used. The study included complete 2D imaging, M-mode, complete spectral Doppler, and color Doppler. Image  quality was adequate.    LEFT VENTRICLE: Size was normal. Systolic function was normal. Ejection fraction was estimated to be 65 %. There were no regional wall motion abnormalities. Wall thickness was normal. DOPPLER: Left ventricular diastolic function parameters  were normal.    RIGHT VENTRICLE: The size was normal. Systolic function was normal. Wall thickness was normal.    LEFT ATRIUM: Size was normal.    RIGHT ATRIUM: Size was normal.    MITRAL VALVE: Valve structure was normal. There was normal leaflet separation. DOPPLER: The transmitral velocity was within the normal range. There was no evidence for stenosis. There was no regurgitation.    AORTIC VALVE: The valve was trileaflet. Leaflets exhibited normal thickness and normal cuspal separation. DOPPLER: Transaortic velocity was within the normal range. There was no evidence for stenosis. There was mild regurgitation.    TRICUSPID VALVE: The valve structure was normal. There was normal leaflet separation. DOPPLER: The transtricuspid velocity was within the normal range. There was no evidence for stenosis. There was trace regurgitation. Estimated peak PA  pressure was 33 mmHg.    PULMONIC VALVE: Leaflets exhibited normal thickness, no calcification, and normal cuspal separation. DOPPLER: The transpulmonic velocity was within the normal range. There was trace regurgitation.    PERICARDIUM: There was no pericardial effusion. The pericardium was normal in appearance.    AORTA: The root exhibited normal size.    SYSTEM MEASUREMENT TABLES    2D  %FS: 28.31 %  Ao Diam: 3.64 cm  EDV(Teich): 103.04 ml  EF(Teich): 54.65 %  ESV(Teich):  46.73 ml  IVSd: 1.05 cm  LA Area: 19.25 cm2  LA Diam: 3.79 cm  LVEDV MOD A4C: 121.39 ml  LVEF MOD A4C: 68.97 %  LVESV MOD A4C: 37.67 ml  LVIDd: 4.71 cm  LVIDs: 3.38 cm  LVLd A4C: 8.65 cm  LVLs A4C: 6.85 cm  LVPWd: 1.08 cm  RA Area: 20.45 cm2  RVIDd: 3.63 cm  SV MOD A4C: 83.73 ml  SV(Teich): 56.31 ml    CW  TR Vmax: 2.72 m/s  TR maxP.5 mmHg    MM  TAPSE: 2.23 cm    PW  E' Sept: 0.08 m/s  E/E' Sept: 10.66  MV A Jaguar: 0.4 m/s  MV Dec Casey: 4.93 m/s2  MV DecT: 166.8 ms  MV E Jaguar: 0.82 m/s  MV E/A Ratio: 2.05  MV PHT: 48.37 ms  MVA By PHT: 4.55 cm2    [facility] Accredited Echocardiography Laboratory    Prepared and electronically signed by    Judith Lopez MD  Signed 10-Feb-2021 11:00:30    No results found for this or any previous visit.    No results found for this or any previous visit.    No results found for this or any previous visit.                 [1]   Current Outpatient Medications:     aspirin (ECOTRIN LOW STRENGTH) 81 mg EC tablet, Take 81 mg by mouth daily  , Disp: , Rfl:     atorvastatin (LIPITOR) 20 mg tablet, TAKE 1 TABLET DAILY, Disp: 90 tablet, Rfl: 1    cholecalciferol (VITAMIN D3) 1,000 units tablet, Take 1,000 Units by mouth daily, Disp: , Rfl:     Coenzyme Q10 (CoQ10) 100 MG CAPS, Take by mouth daily , Disp: , Rfl:     cyanocobalamin (VITAMIN B-12) 100 mcg tablet, Take by mouth daily, Disp: , Rfl:     Glucosamine-Chondroit-Vit C-Mn (GLUCOSAMINE CHONDR 1500 COMPLX PO), Take by mouth daily  , Disp: , Rfl:     metroNIDAZOLE (METROGEL) 0.75 % gel, Apply topically 2 (two) times a day, Disp: 90 g, Rfl: 2    Multiple Vitamin (multivitamin) capsule, Take 1 capsule by mouth daily, Disp: , Rfl:     Omega 3 1000 MG CAPS, Take by mouth daily , Disp: , Rfl:     tamsulosin (FLOMAX) 0.4 mg, Take 1 capsule (0.4 mg total) by mouth daily with dinner, Disp: 90 capsule, Rfl: 3  [2]   Family History  Problem Relation Name Age of Onset    Pancreatic cancer Mother SiriakerrieMark Anthonydipika     Cancer Mother Tierra          Bladder /liver    Liver cancer Father Isai Parson,      Stroke Father Isai Parson,           from stroke    Cancer Father Isai Parson,          Pancreas    Cancer Brother IsaiJr    [3]   Social History  Tobacco Use   Smoking Status Former    Current packs/day: 0.00    Average packs/day: 0.3 packs/day for 15.0 years (3.8 ttl pk-yrs)    Types: Cigarettes    Start date: 10/8/1961    Quit date: 10/8/1977    Years since quittin.6   Smokeless Tobacco Never

## 2025-05-29 ENCOUNTER — OFFICE VISIT (OUTPATIENT)
Age: 85
End: 2025-05-29
Payer: MEDICARE

## 2025-05-29 VITALS
WEIGHT: 208.8 LBS | OXYGEN SATURATION: 98 % | HEART RATE: 73 BPM | SYSTOLIC BLOOD PRESSURE: 132 MMHG | HEIGHT: 74 IN | TEMPERATURE: 96 F | DIASTOLIC BLOOD PRESSURE: 72 MMHG | BODY MASS INDEX: 26.8 KG/M2

## 2025-05-29 DIAGNOSIS — N40.1 BENIGN PROSTATIC HYPERPLASIA WITH LOWER URINARY TRACT SYMPTOMS, SYMPTOM DETAILS UNSPECIFIED: ICD-10-CM

## 2025-05-29 DIAGNOSIS — G47.33 OSA (OBSTRUCTIVE SLEEP APNEA): ICD-10-CM

## 2025-05-29 DIAGNOSIS — N18.31 STAGE 3A CHRONIC KIDNEY DISEASE (HCC): ICD-10-CM

## 2025-05-29 DIAGNOSIS — R73.09 ABNORMAL GLUCOSE: ICD-10-CM

## 2025-05-29 DIAGNOSIS — Z95.0 STATUS POST PLACEMENT OF CARDIAC PACEMAKER: ICD-10-CM

## 2025-05-29 DIAGNOSIS — R00.1 SINUS BRADYCARDIA: Primary | ICD-10-CM

## 2025-05-29 DIAGNOSIS — R03.0 ELEVATED BP WITHOUT DIAGNOSIS OF HYPERTENSION: ICD-10-CM

## 2025-05-29 DIAGNOSIS — E78.00 PURE HYPERCHOLESTEROLEMIA: ICD-10-CM

## 2025-05-29 PROBLEM — R89.9 ABNORMAL LABORATORY TEST: Status: RESOLVED | Noted: 2021-10-06 | Resolved: 2025-05-29

## 2025-05-29 PROBLEM — U07.1 COVID: Status: RESOLVED | Noted: 2024-11-14 | Resolved: 2025-05-29

## 2025-05-29 PROBLEM — R07.81 RIB PAIN ON LEFT SIDE: Status: RESOLVED | Noted: 2022-01-19 | Resolved: 2025-05-29

## 2025-05-29 PROCEDURE — 99214 OFFICE O/P EST MOD 30 MIN: CPT | Performed by: INTERNAL MEDICINE

## 2025-05-29 PROCEDURE — G2211 COMPLEX E/M VISIT ADD ON: HCPCS | Performed by: INTERNAL MEDICINE

## 2025-05-29 NOTE — ASSESSMENT & PLAN NOTE
Patient currently getting up about 3 times at night to urinate.  Will be seeing urology in the near future to see what can be done to try to decrease the frequency of nocturnal urination

## 2025-05-29 NOTE — ASSESSMENT & PLAN NOTE
Lab Results   Component Value Date    EGFR 55 05/08/2025    EGFR 71 01/25/2023    EGFR 68 01/24/2023    CREATININE 1.17 05/08/2025    CREATININE 1.04 01/25/2023    CREATININE 1.08 01/24/2023   Current GFR reading is 55 cc/min placing the patient in stage IIIa chronic kidney disease factor for kidney disease include age

## 2025-05-29 NOTE — ASSESSMENT & PLAN NOTE
Continue low-cholesterol diet and atorvastatin at 20 mg daily follow-up lipid profile has been recommended    Orders:    Lipid panel; Future

## 2025-05-29 NOTE — ASSESSMENT & PLAN NOTE
Patient is status post pacemaker insertion by Dr. De La Cruz doing well.  Threshold for pacemaker is 60 bpm expected life of battery approximately 10 years.

## 2025-05-29 NOTE — PROGRESS NOTES
Name: Kelvin Parsno II      : 1940      MRN: [id number]  Encounter Provider: Maurice Larson MD  Encounter Date: 2025   Encounter department: [facility] INTERNAL MEDICINE    Assessment & Plan  Pure hypercholesterolemia  Continue low-cholesterol diet and atorvastatin at 20 mg daily follow-up lipid profile has been recommended    Orders:    Lipid panel; Future    Abnormal glucose    Orders:    Hemoglobin A1C; Future    Comprehensive metabolic panel; Future    Sinus bradycardia  Patient is status post pacemaker insertion by Dr. De La Cruz doing well.  Threshold for pacemaker is 60 bpm expected life of battery approximately 10 years.         LIOR (obstructive sleep apnea)  Patient has been diagnosed with sleep apnea.  Currently trying to adjust to CPAP device pressure setting is 5 to 10 cm of water.  Benefits of sleep apnea treatment reviewed with the patient         Benign prostatic hyperplasia with lower urinary tract symptoms, symptom details unspecified  Patient currently getting up about 3 times at night to urinate.  Will be seeing urology in the near future to see what can be done to try to decrease the frequency of nocturnal urination         Stage 3a chronic kidney disease (HCC)  Lab Results   Component Value Date    EGFR 55 2025    EGFR 71 2023    EGFR 68 2023    CREATININE 1.17 2025    CREATININE 1.04 2023    CREATININE 1.08 2023   Current GFR reading is 55 cc/min placing the patient in stage IIIa chronic kidney disease factor for kidney disease include age         Elevated BP without diagnosis of hypertension  Blood pressure reading at times mildly elevated without a diagnosis of hypertension or initiation of medication recommend continued surveillance         Status post placement of Abbott leadless Aveir pacemaker 2025  Doing well with heart rate running minimum of 60 bpm since insertion of pacemaker              History of Present Illness  "    This 85-year-old gentleman returns today for follow-up visit.  Since his last visit with us he has undergone a pacemaker insertion.  The type of pacemaker he has is 1 that is inserted into the upper chamber of the heart.  His cardiologist is Dr. Barney.  Patient reports feeling better since this procedure.  Symptoms that prompted the pacemaker included fatigue bradycardia and lightheadedness.    The patient suffered the loss of a dental implant bottom left jaw.  Is anticipating a new implant to replace the 1 that failed.    The patient today shared with me information from his \"smart ring \"which monitors heart rate sleep patterns and heart rhythm.    Home blood pressure monitoring indicates systolic readings running 126-135 and diastolic readings running from 65-77.  Heart rate most recently over this month is been averaging in the 70 range.  It appears that his pacemaker is set for a Default rate of 60 bpm.      Review of Systems   Constitutional:  Positive for fatigue.   All other systems reviewed and are negative.    Past Medical History[1]  Past Surgical History[2]  Family History[3]  Social History[4]  Medications[5]  Allergies   Allergen Reactions    Sulfa Antibiotics Hives     Immunization History   Administered Date(s) Administered    COVID-19 MODERNA VACC 0.5 ML IM 01/26/2021, 02/24/2021, 10/27/2021, 04/06/2022    COVID-19 Moderna Vac BIVALENT 12 Yr+ IM 0.5 ML 09/27/2022    COVID-19 Moderna mRNA Vaccine 12 Yr+ 50 mcg/0.5 mL (Spikevax) 09/27/2023    COVID-19 Pfizer mRNA vacc PF tony-sucrose 12 yr and older (Comirnaty) 09/20/2024    DT (pediatric) 08/12/2012    INFLUENZA 10/02/2015, 10/17/2016, 09/01/2017, 09/30/2017, 09/15/2018, 09/26/2018, 08/25/2020, 08/28/2020, 11/01/2021, 10/15/2022, 09/27/2023    Influenza Split 01/01/2011, 01/01/2012, 09/27/2013    Influenza Split High Dose Preservative Free IM 10/17/2016, 09/26/2018, 10/24/2024    Pneumococcal Conjugate 13-Valent 03/03/2015    Pneumococcal " "Polysaccharide PPV23 09/12/2016    Zoster 11/30/2012    Zoster Vaccine Recombinant 07/21/2019, 10/08/2019     Objective   /72   Pulse 73   Temp (!) 96 °F (35.6 °C) (Tympanic)   Ht 6' 2\" (1.88 m)   Wt 94.7 kg (208 lb 12.8 oz)   SpO2 98%   BMI 26.81 kg/m²     Physical Exam  Vitals and nursing note reviewed.   Constitutional:       General: He is not in acute distress.     Appearance: He is well-developed.   HENT:      Head: Normocephalic and atraumatic.     Eyes:      Conjunctiva/sclera: Conjunctivae normal.       Cardiovascular:      Rate and Rhythm: Normal rate and regular rhythm.      Heart sounds: Normal heart sounds. No murmur heard.  Pulmonary:      Effort: Pulmonary effort is normal. No respiratory distress.      Breath sounds: Normal breath sounds. No wheezing, rhonchi or rales.   Abdominal:      Palpations: Abdomen is soft.     Musculoskeletal:         General: No swelling.      Cervical back: Neck supple.     Skin:     General: Skin is warm and dry.      Capillary Refill: Capillary refill takes less than 2 seconds.     Neurological:      General: No focal deficit present.      Mental Status: He is alert. Mental status is at baseline.     Psychiatric:         Mood and Affect: Mood normal.         Behavior: Behavior normal.         Thought Content: Thought content normal.         Judgment: Judgment normal.              [1]   Past Medical History:  Diagnosis Date    Bladder cancer (HCC)     Cancer (HCC) Bladder 1998    Chronic kidney disease 2018    Coronary artery disease 2006    HL (hearing loss) 2018    Irregular heart beat     Afib    Sleep apnea, obstructive    [2]   Past Surgical History:  Procedure Laterality Date    ABLATION OF DYSRHYTHMIC FOCUS  July 2014    CARDIAC ELECTROPHYSIOLOGY PROCEDURE N/A 05/08/2025    Procedure: Cardiac leadless pacer implant AVEIR;  Surgeon: Blair De La Cruz DO;  Location: [facility];  Service: Cardiology    CARDIAC SURGERY  2014 ablation    Afib ablation "    CATARACT EXTRACTION, BILATERAL Bilateral [id number]    MD CYSTO INSERTION TRANSPROSTATIC IMPLANT SINGLE N/A 2021    Procedure: CYSTOSCOPY WITH INSERTION UROLIFT;  Surgeon: Jeffery Law MD;  Location: [facility]  MAIN [facility];  Service: Urology    PROSTATE SURGERY  Uro-lift    [3]   Family History  Problem Relation Name Age of Onset    Pancreatic cancer Mother Tierra     Cancer Mother Tierra         Bladder /liver    Liver cancer Father Isai Parson Sr     Stroke Father Isai Prason Sr          from stroke    Cancer Father Isai Parson Sr         Pancreas    Cancer Brother Jr Isai    [4]   Social History  Tobacco Use    Smoking status: Former     Current packs/day: 0.00     Average packs/day: 0.3 packs/day for 15.0 years (3.8 ttl pk-yrs)     Types: Cigarettes     Start date: 10/8/1961     Quit date: 10/8/1977     Years since quittin.6    Smokeless tobacco: Never   Vaping Use    Vaping status: Never Used   Substance and Sexual Activity    Alcohol use: Yes     Alcohol/week: 5.0 standard drinks of alcohol     Types: 5 Glasses of wine per week     Comment: occasional wine    Drug use: Never    Sexual activity: Not Currently     Partners: Female     Birth control/protection: Condom Male     Comment: Wife on pill   [5]   Current Outpatient Medications on File Prior to Visit   Medication Sig    aspirin (ECOTRIN LOW STRENGTH) 81 mg EC tablet Take 81 mg by mouth in the morning.    atorvastatin (LIPITOR) 20 mg tablet TAKE 1 TABLET DAILY    cholecalciferol (VITAMIN D3) 1,000 units tablet Take 1,000 Units by mouth in the morning.    Coenzyme Q10 (CoQ10) 100 MG CAPS Take by mouth in the morning.    cyanocobalamin (VITAMIN B-12) 100 mcg tablet Take by mouth in the morning.    Glucosamine-Chondroit-Vit C-Mn (GLUCOSAMINE CHONDR 1500 COMPLX PO) Take by mouth in the morning.    metroNIDAZOLE (METROGEL) 0.75 % gel Apply topically 2 (two) times a day    Multiple Vitamin (multivitamin) capsule Take 1 capsule by mouth in  the morning.    Omega 3 1000 MG CAPS Take by mouth in the morning.    [DISCONTINUED] tamsulosin (FLOMAX) 0.4 mg Take 1 capsule (0.4 mg total) by mouth daily with dinner

## 2025-05-29 NOTE — ASSESSMENT & PLAN NOTE
Patient has been diagnosed with sleep apnea.  Currently trying to adjust to CPAP device pressure setting is 5 to 10 cm of water.  Benefits of sleep apnea treatment reviewed with the patient

## 2025-05-29 NOTE — ASSESSMENT & PLAN NOTE
Blood pressure reading at times mildly elevated without a diagnosis of hypertension or initiation of medication recommend continued surveillance

## 2025-06-04 ENCOUNTER — OFFICE VISIT (OUTPATIENT)
Dept: UROLOGY | Facility: CLINIC | Age: 85
End: 2025-06-04
Payer: MEDICARE

## 2025-06-04 VITALS
SYSTOLIC BLOOD PRESSURE: 112 MMHG | HEART RATE: 78 BPM | BODY MASS INDEX: 26.71 KG/M2 | OXYGEN SATURATION: 98 % | DIASTOLIC BLOOD PRESSURE: 62 MMHG | WEIGHT: 208 LBS

## 2025-06-04 DIAGNOSIS — N40.1 BPH WITH OBSTRUCTION/LOWER URINARY TRACT SYMPTOMS: ICD-10-CM

## 2025-06-04 DIAGNOSIS — N39.41 URGE INCONTINENCE OF URINE: Primary | ICD-10-CM

## 2025-06-04 DIAGNOSIS — N13.8 BPH WITH OBSTRUCTION/LOWER URINARY TRACT SYMPTOMS: ICD-10-CM

## 2025-06-04 PROCEDURE — 99213 OFFICE O/P EST LOW 20 MIN: CPT | Performed by: PHYSICIAN ASSISTANT

## 2025-06-04 RX ORDER — TROSPIUM CHLORIDE 20 MG/1
20 TABLET, FILM COATED ORAL 2 TIMES DAILY
Qty: 60 TABLET | Refills: 6 | Status: SHIPPED | OUTPATIENT
Start: 2025-06-04

## 2025-06-04 NOTE — PROGRESS NOTES
Name: Kelvin Parson II      : 1940      MRN: [id number]  Encounter Provider: Ezekiel Ken PA-C  Encounter Date: 2025   Encounter department: [facility] [place]  :  Assessment & Plan  Urge incontinence of urine    Orders:    trospium chloride (SANCTURA) 20 mg tablet; Take 1 tablet (20 mg total) by mouth 2 (two) times a day    BPH with obstruction/lower urinary tract symptoms  We will schedule cystoscopy and TRUS with Dr. Law.         History of Present Illness   Kelvin Parson II is a 85 y.o. male who presents history of BPH and UroLift in .  Has urinary urgency and frequency.  Was seen in December and discussed trying Myrbetriq's, pelvic floor physical therapy and third line therapy such as Botox.  He recently had a heart pacemaker implanted and was started on CPAP.  Initially he thought there was an improvement in his nocturia but after period of time he is back to getting up 3 times per night.  Recommend cystoscopy and TRUS since he has failed alpha blockers and anticholinergics and it has been about 4 years since his UroLift.  I did order Tropium 20 mg twice a day to try prior to that appointment.  It looks like he has been on this before as well as Ditropan and Myrbetriq's.  He has also been on tamsulosin which she continues to take and finasteride.  I explained that 3 times per night and some urgency at 85 years old is not uncommon under most any situation.  AUA SYMPTOM SCORE      Flowsheet Row Most Recent Value   AUA SYMPTOM SCORE    How often have you had a sensation of not emptying your bladder completely after you finished urinating? 0 (P)     How often have you had to urinate again less than two hours after you finished urinating? 3 (P)     How often have you found you stopped and started again several times when you urinate? 2 (P)     How often have you found it difficult to postpone urination? 2 (P)     How often have you had a weak urinary  "stream? 2 (P)     How often have you had to push or strain to begin urination? 0 (P)     How many times did you most typically get up to urinate from the time you went to bed at night until the time you got up in the morning? 5 (P)     Quality of Life: If you were to spend the rest of your life with your urinary condition just the way it is now, how would you feel about that? 4 (P)     AUA SYMPTOM SCORE 14 (P)            Review of Systems       Objective   There were no vitals taken for this visit.    Physical Exam GEN: no acute distress    RESP: breathing comfortably with no accessory muscle use    ABD: soft, non-tender, non-distended   INCISION:    EXT: no significant peripheral edema         RADIOLOGY:   none        Results   No results found for: \"PSA\"  Lab Results   Component Value Date    CALCIUM 9.7 05/08/2025    K 3.7 05/08/2025    CO2 26 05/08/2025     05/08/2025    BUN 24 05/08/2025    CREATININE 1.17 05/08/2025     Lab Results   Component Value Date    WBC 6.55 05/08/2025    HGB 14.7 05/08/2025    HCT 45.0 05/08/2025    MCV 93 05/08/2025     05/08/2025       Office Urine Dip  No results found for this or any previous visit (from the past hour).      "

## 2025-06-17 DIAGNOSIS — E78.00 PURE HYPERCHOLESTEROLEMIA: ICD-10-CM

## 2025-06-17 RX ORDER — ATORVASTATIN CALCIUM 20 MG/1
20 TABLET, FILM COATED ORAL DAILY
Qty: 90 TABLET | Refills: 0 | Status: SHIPPED | OUTPATIENT
Start: 2025-06-17

## 2025-06-19 ENCOUNTER — ESTABLISHED COMPREHENSIVE EXAM (OUTPATIENT)
Dept: URBAN - METROPOLITAN AREA CLINIC 6 | Facility: CLINIC | Age: 85
End: 2025-06-19

## 2025-06-19 DIAGNOSIS — H04.123: ICD-10-CM

## 2025-06-19 DIAGNOSIS — H26.493: ICD-10-CM

## 2025-06-19 DIAGNOSIS — H52.4: ICD-10-CM

## 2025-06-19 PROCEDURE — 92014 COMPRE OPH EXAM EST PT 1/>: CPT

## 2025-06-19 PROCEDURE — 92015 DETERMINE REFRACTIVE STATE: CPT

## 2025-06-19 ASSESSMENT — TONOMETRY
OS_IOP_MMHG: 12
OD_IOP_MMHG: 10

## 2025-06-19 ASSESSMENT — VISUAL ACUITY
OD_CC: 20/30
OS_CC: 20/25
OU_CC: J1

## 2025-06-20 ENCOUNTER — TELEPHONE (OUTPATIENT)
Age: 85
End: 2025-06-20

## 2025-06-20 NOTE — TELEPHONE ENCOUNTER
Pt called for lab results that he took on 6/2 by mobile [facility] . No results and still shows active. He will call [facility] , he is asking if there is anything we can do on our end to find out?

## 2025-06-23 ENCOUNTER — TELEPHONE (OUTPATIENT)
Age: 85
End: 2025-06-23

## 2025-06-23 DIAGNOSIS — N40.1 BENIGN PROSTATIC HYPERPLASIA WITH URINARY FREQUENCY: ICD-10-CM

## 2025-06-23 DIAGNOSIS — R35.0 BENIGN PROSTATIC HYPERPLASIA WITH URINARY FREQUENCY: ICD-10-CM

## 2025-06-23 RX ORDER — TAMSULOSIN HYDROCHLORIDE 0.4 MG/1
0.4 CAPSULE ORAL
Qty: 90 CAPSULE | Refills: 3 | Status: SHIPPED | OUTPATIENT
Start: 2025-06-23

## 2025-06-23 NOTE — TELEPHONE ENCOUNTER
Reason for call:   [x] Refill   [] Prior Auth  [] Other: stated stopping this didn't change anything so he'd like to go back on it     Office:   [x] PCP/Provider - Dr Larson   [] Specialty/Provider -     Medication: tamsulosin     Dose/Frequency: 0.4 mg take daily     Quantity: 90    Pharmacy: [facility] in [facility]   Does the patient have enough for 3 days?   [x] Yes   [] No - Send as HP to POD    Mail Away Pharmacy   Does the patient have enough for 10 days?   [] Yes   [] No - Send as HP to POD

## 2025-06-23 NOTE — TELEPHONE ENCOUNTER
Flomax 0.4 mg #90 1 daily sent to [facility] pharmacy thank you

## 2025-06-27 ENCOUNTER — OFFICE VISIT (OUTPATIENT)
Dept: PULMONOLOGY | Facility: CLINIC | Age: 85
End: 2025-06-27
Payer: MEDICARE

## 2025-06-27 VITALS
OXYGEN SATURATION: 96 % | TEMPERATURE: 97.7 F | SYSTOLIC BLOOD PRESSURE: 150 MMHG | DIASTOLIC BLOOD PRESSURE: 70 MMHG | HEIGHT: 74 IN | HEART RATE: 78 BPM | WEIGHT: 210 LBS | BODY MASS INDEX: 26.95 KG/M2

## 2025-06-27 DIAGNOSIS — G47.33 OSA (OBSTRUCTIVE SLEEP APNEA): Primary | ICD-10-CM

## 2025-06-27 DIAGNOSIS — I48.0 PAROXYSMAL ATRIAL FIBRILLATION (HCC): ICD-10-CM

## 2025-06-27 LAB
DME PARACHUTE DELIVERY DATE REQUESTED: NORMAL
DME PARACHUTE ITEM DESCRIPTION: NORMAL
DME PARACHUTE ORDER STATUS: NORMAL
DME PARACHUTE SUPPLIER NAME: NORMAL
DME PARACHUTE SUPPLIER PHONE: NORMAL

## 2025-06-27 PROCEDURE — 99213 OFFICE O/P EST LOW 20 MIN: CPT | Performed by: INTERNAL MEDICINE

## 2025-06-27 PROCEDURE — G2211 COMPLEX E/M VISIT ADD ON: HCPCS | Performed by: INTERNAL MEDICINE

## 2025-06-27 NOTE — ASSESSMENT & PLAN NOTE
He has history of paroxysmal atrial fibrillation and he is status post cardiac ablation therapy.  Currently he is not on any systemic anticoagulation.  He has noted that his heart rate is low when he is sleeping, on his electronic watch monitor.  He had previous history of bigeminy.  Stated that his deep sleep has been low when monitored on his electronic device at home.  I explained to him in detail.

## 2025-06-27 NOTE — ASSESSMENT & PLAN NOTE
Mr.Russell Parson was diagnosed with obstructive sleep apnea mild degree on 11/16/2020 on a diagnostic overnight sleep study.  His overall AHI was 6.4 and he had oxygen desaturation to 86%.  His subsequent CPAP titration study showed a CPAP pressure requirement of 7 cm of water.  He did not start himself on CPAP therapy.  Currently he has interrupted sleep and wakes up not refreshed.  He felt sleepy and tired during the day.  His [place] sleepiness score was 6 out of 24. On clinical examination, he had oropharyngeal crowding.  His repeat sleep study done on 3/21/2025 showed mild obstructive sleep apnea with oxygen desaturation.  He has been subsequently started on PAP therapy.  Currently he is on CPAP 7 cm of water and his compliance is good and his residual AHI is low.  I have increased this to 8 cm of water. I had a long discussion with him and answered all his questions.  I encouraged him to continue on CPAP therapy.  He is getting clinical benefit from therapy and is medically necessary for him.  I reminded him to change the mask and filters periodically.    Orders:    PAP DME Pressure Change

## 2025-06-27 NOTE — PROGRESS NOTES
Name: Kelvin Parson II      : 1940      MRN: [id number]  Encounter Provider: Luis Villafana MD  Encounter Date: 2025   Encounter department: [facility] PULMONARY & [facility] [place]  :  Assessment & Plan  LIOR (obstructive sleep apnea)  Mr.Russell Parson was diagnosed with obstructive sleep apnea mild degree on 2020 on a diagnostic overnight sleep study.  His overall AHI was 6.4 and he had oxygen desaturation to 86%.  His subsequent CPAP titration study showed a CPAP pressure requirement of 7 cm of water.  He did not start himself on CPAP therapy.  Currently he has interrupted sleep and wakes up not refreshed.  He felt sleepy and tired during the day.  His [place] sleepiness score was 6 out of 24. On clinical examination, he had oropharyngeal crowding.  His repeat sleep study done on 3/21/2025 showed mild obstructive sleep apnea with oxygen desaturation.  He has been subsequently started on PAP therapy.  Currently he is on CPAP 7 cm of water and his compliance is good and his residual AHI is low.  I have increased this to 8 cm of water. I had a long discussion with him and answered all his questions.  I encouraged him to continue on CPAP therapy.  He is getting clinical benefit from therapy and is medically necessary for him.  I reminded him to change the mask and filters periodically.    Orders:    PAP DME Pressure Change    Paroxysmal atrial fibrillation (HCC)  He has history of paroxysmal atrial fibrillation and he is status post cardiac ablation therapy.  Currently he is not on any systemic anticoagulation.  He has noted that his heart rate is low when he is sleeping, on his electronic watch monitor.  He had previous history of bigeminy.  Stated that his deep sleep has been low when monitored on his electronic device at home.  I explained to him in detail.         History of Present Illness   General  Pertinent negatives include no abdominal pain, arthralgias, chest pain,  chills, coughing, fatigue, fever, headaches, myalgias, nausea, rash, sore throat or vomiting.     Kelvin Parson II is a 85 y.o. male past medical history of obstructive sleep apnea paroxysmal atrial fibrillation who has come for follow-up.  He had a leadless pacemaker placed by his cardiologist recently for bradycardia.  Currently he is feeling much better.  He has been using CPAP with a fixed pressure of 7 cm of water.  His sleep study showed mild obstructive sleep apnea with oxygen desaturation.  We had ordered auto CPAP 5 to 15 cm of water.  He has been using a fullface mask.  He started with 4 cm of water pressure and after he felt uncomfortable with that this was increased to 7 cm by his DME.  Currently he is comfortable with the mask and pressure except for occasional mouth dryness.  He has not been changing the mask regularly and have advised him in this regard.  He reported improvement in daytime sleepiness and tiredness though he occasionally feels sleepy.  I reviewed his CPAP compliance and this was satisfactory.  His residual AHI was 4.9 on a pressure of 7 cm water.  He had 6% Cheyne-Bills breathing.  He denied any significant shortness of breath or cough or phlegm wheeze or chest pain.  He had a history of allergies.  His blood pressure was found to be slightly elevated at 150/70 in the office today and have advised him to recheck at home.      Review of Systems   Constitutional:  Negative for appetite change, chills, fatigue and fever.   HENT:  Negative for ear pain, hearing loss, postnasal drip, rhinorrhea, sneezing, sore throat and trouble swallowing.    Respiratory:  Negative for cough, shortness of breath and wheezing.    Cardiovascular:  Negative for chest pain, palpitations and leg swelling.   Gastrointestinal:  Negative for abdominal pain, constipation, diarrhea, nausea and vomiting.   Genitourinary:  Negative for dysuria, frequency and urgency.   Musculoskeletal:  Negative for arthralgias,  "back pain and myalgias.   Skin:  Negative for rash.   Allergic/Immunologic: Negative for environmental allergies.   Neurological:  Negative for dizziness, syncope, light-headedness and headaches.   Psychiatric/Behavioral:  Negative for agitation, confusion and sleep disturbance. The patient is not nervous/anxious.           Objective   /70 (BP Location: Left arm, Patient Position: Sitting, Cuff Size: Standard)   Pulse 78   Temp 97.7 °F (36.5 °C) (Temporal)   Ht 6' 2\" (1.88 m)   Wt 95.3 kg (210 lb)   SpO2 96%   BMI 26.96 kg/m²      Physical Exam  Vitals reviewed.   Constitutional:       General: He is not in acute distress.     Appearance: He is obese. He is not ill-appearing or toxic-appearing.   HENT:      Head: Normocephalic.      Mouth/Throat:      Mouth: Mucous membranes are moist.     Eyes:      General: No scleral icterus.     Conjunctiva/sclera: Conjunctivae normal.       Cardiovascular:      Rate and Rhythm: Normal rate and regular rhythm.      Heart sounds: Normal heart sounds. No murmur heard.  Pulmonary:      Effort: Pulmonary effort is normal. No respiratory distress.      Breath sounds: Normal breath sounds. No wheezing, rhonchi or rales.   Abdominal:      Palpations: Abdomen is soft.      Tenderness: There is no abdominal tenderness. There is no guarding.     Musculoskeletal:      Cervical back: Neck supple. No rigidity.      Right lower leg: No edema.      Left lower leg: No edema.   Lymphadenopathy:      Cervical: No cervical adenopathy.     Skin:     Coloration: Skin is not jaundiced or pale.      Findings: No rash.     Neurological:      Mental Status: He is alert and oriented to person, place, and time.      Gait: Gait normal.     Psychiatric:         Mood and Affect: Mood normal.         Behavior: Behavior normal.         Thought Content: Thought content normal.         Judgment: Judgment normal.           Answers submitted by the patient for this visit:  Pulmonology Questionnaire " (Submitted on 6/26/2025)  Chief Complaint: Primary symptoms  Do you have shortness of breath that occurs with effort or exertion?: No  Do you have ear congestion?: No  Do you have heartburn?: No  Do you have fatigue?: No  Do you have nasal congestion?: No  Do you have shortness of breath when lying flat?: No  Do you have shortness of breath when you wake up?: No  Do you have sweats?: No  Have you experienced weight loss?: No  Which of the following makes your symptoms worse?: nothing  Which of the following makes your symptoms better?: nothing

## 2025-07-01 LAB
DME PARACHUTE DELIVERY DATE ACTUAL: NORMAL
DME PARACHUTE DELIVERY DATE REQUESTED: NORMAL
DME PARACHUTE ITEM DESCRIPTION: NORMAL
DME PARACHUTE ORDER STATUS: NORMAL
DME PARACHUTE SUPPLIER NAME: NORMAL
DME PARACHUTE SUPPLIER PHONE: NORMAL

## 2025-07-15 ENCOUNTER — OFFICE VISIT (OUTPATIENT)
Dept: CARDIOLOGY CLINIC | Facility: CLINIC | Age: 85
End: 2025-07-15
Payer: MEDICARE

## 2025-07-15 VITALS
HEIGHT: 74 IN | DIASTOLIC BLOOD PRESSURE: 70 MMHG | BODY MASS INDEX: 26.95 KG/M2 | SYSTOLIC BLOOD PRESSURE: 104 MMHG | HEART RATE: 91 BPM | WEIGHT: 210 LBS | OXYGEN SATURATION: 98 %

## 2025-07-15 DIAGNOSIS — I49.3 PVC (PREMATURE VENTRICULAR CONTRACTION): ICD-10-CM

## 2025-07-15 DIAGNOSIS — Z86.79 STATUS POST RADIOFREQUENCY ABLATION (RFA) OPERATION FOR ARRHYTHMIA: ICD-10-CM

## 2025-07-15 DIAGNOSIS — E78.00 PURE HYPERCHOLESTEROLEMIA: ICD-10-CM

## 2025-07-15 DIAGNOSIS — Z98.890 STATUS POST RADIOFREQUENCY ABLATION (RFA) OPERATION FOR ARRHYTHMIA: ICD-10-CM

## 2025-07-15 DIAGNOSIS — Z95.0 STATUS POST PLACEMENT OF CARDIAC PACEMAKER: ICD-10-CM

## 2025-07-15 DIAGNOSIS — I48.0 PAROXYSMAL ATRIAL FIBRILLATION (HCC): ICD-10-CM

## 2025-07-15 DIAGNOSIS — I25.10 CORONARY ARTERY DISEASE DUE TO CALCIFIED CORONARY LESION: ICD-10-CM

## 2025-07-15 DIAGNOSIS — I49.5 SSS (SICK SINUS SYNDROME) (HCC): Primary | ICD-10-CM

## 2025-07-15 DIAGNOSIS — I49.8 BIGEMINY: ICD-10-CM

## 2025-07-15 DIAGNOSIS — I25.84 CORONARY ARTERY DISEASE DUE TO CALCIFIED CORONARY LESION: ICD-10-CM

## 2025-07-15 DIAGNOSIS — R00.1 SINUS BRADYCARDIA: ICD-10-CM

## 2025-07-15 PROCEDURE — 99214 OFFICE O/P EST MOD 30 MIN: CPT | Performed by: INTERNAL MEDICINE

## (undated) DEVICE — Device: Brand: VASCULAR DILATOR KIT

## (undated) DEVICE — PREMIUM DRY TRAY LF: Brand: MEDLINE INDUSTRIES, INC.

## (undated) DEVICE — INVIEW CLEAR LEGGINGS: Brand: CONVERTORS

## (undated) DEVICE — CATH SOFT-VU 5FR 65CM BERENSTEIN

## (undated) DEVICE — GLOVE SRG BIOGEL 7

## (undated) DEVICE — DGW .035 FC J3MM 150CM TEF: Brand: EMERALD

## (undated) DEVICE — Device

## (undated) DEVICE — BAG URINE DRAINAGE 2000ML ANTI RFLX LF

## (undated) DEVICE — CATH FOLEY 20FR 5ML 2 WAY SILICONE ELASTIMER

## (undated) DEVICE — CHLORHEXIDINE 4PCT 4 OZ

## (undated) DEVICE — STERILE SURGICAL LUBRICANT,  TUBE: Brand: SURGILUBE

## (undated) DEVICE — GUIDEWIRE AMPLATZ .035 180CM 6CM ST SS

## (undated) DEVICE — UROCATCH BAG

## (undated) DEVICE — SHEATH INTRO HYDROPHILIC 50CM

## (undated) DEVICE — PACK TUR